# Patient Record
Sex: MALE | Race: WHITE
[De-identification: names, ages, dates, MRNs, and addresses within clinical notes are randomized per-mention and may not be internally consistent; named-entity substitution may affect disease eponyms.]

---

## 2017-02-11 ENCOUNTER — HOSPITAL ENCOUNTER (INPATIENT)
Dept: HOSPITAL 72 - EMR | Age: 82
LOS: 4 days | Discharge: NURSING, REHAB OR LONG-TERM CARE | DRG: 193 | End: 2017-02-15
Payer: MEDICARE

## 2017-02-11 VITALS — SYSTOLIC BLOOD PRESSURE: 121 MMHG | DIASTOLIC BLOOD PRESSURE: 57 MMHG

## 2017-02-11 VITALS — DIASTOLIC BLOOD PRESSURE: 50 MMHG | SYSTOLIC BLOOD PRESSURE: 107 MMHG

## 2017-02-11 VITALS — BODY MASS INDEX: 2.38 KG/M2 | WEIGHT: 17 LBS | HEIGHT: 71 IN

## 2017-02-11 VITALS — SYSTOLIC BLOOD PRESSURE: 107 MMHG | DIASTOLIC BLOOD PRESSURE: 47 MMHG

## 2017-02-11 VITALS — DIASTOLIC BLOOD PRESSURE: 60 MMHG | SYSTOLIC BLOOD PRESSURE: 104 MMHG

## 2017-02-11 VITALS — SYSTOLIC BLOOD PRESSURE: 118 MMHG | DIASTOLIC BLOOD PRESSURE: 57 MMHG

## 2017-02-11 VITALS — DIASTOLIC BLOOD PRESSURE: 64 MMHG | SYSTOLIC BLOOD PRESSURE: 122 MMHG

## 2017-02-11 DIAGNOSIS — Z74.01: ICD-10-CM

## 2017-02-11 DIAGNOSIS — I12.9: ICD-10-CM

## 2017-02-11 DIAGNOSIS — N18.9: ICD-10-CM

## 2017-02-11 DIAGNOSIS — J18.9: Primary | ICD-10-CM

## 2017-02-11 DIAGNOSIS — N39.0: ICD-10-CM

## 2017-02-11 DIAGNOSIS — R13.10: ICD-10-CM

## 2017-02-11 DIAGNOSIS — F03.90: ICD-10-CM

## 2017-02-11 DIAGNOSIS — Z66: ICD-10-CM

## 2017-02-11 DIAGNOSIS — R62.7: ICD-10-CM

## 2017-02-11 DIAGNOSIS — R53.2: ICD-10-CM

## 2017-02-11 DIAGNOSIS — E11.22: ICD-10-CM

## 2017-02-11 DIAGNOSIS — R09.02: ICD-10-CM

## 2017-02-11 DIAGNOSIS — L89.152: ICD-10-CM

## 2017-02-11 DIAGNOSIS — E11.65: ICD-10-CM

## 2017-02-11 LAB
ALBUMIN/GLOB SERPL: 0.8 {RATIO} (ref 1–2.7)
ALT SERPL-CCNC: 23 U/L (ref 3–41)
ANION GAP SERPL CALC-SCNC: 13 MMOL/L (ref 5–15)
APPEARANCE UR: (no result)
APTT BLD: 30 SEC (ref 23–33)
AST SERPL-CCNC: 14 U/L (ref 5–40)
BACTERIA #/AREA URNS HPF: (no result) /HPF
BASOPHILS NFR BLD MANUAL: 0 % (ref 0–2)
BILIRUB DIRECT SERPL-MCNC: 0.2 MG/DL (ref 0.1–0.3)
CALCIUM SERPL-MCNC: 9.3 MG/DL (ref 8.6–10.2)
CHLORIDE SERPL-SCNC: 96 MEQ/L (ref 98–107)
CO2 SERPL-SCNC: 27 MEQ/L (ref 20–30)
CREAT SERPL-MCNC: 0.9 MG/DL (ref 0.7–1.2)
EOSINOPHIL NFR BLD MANUAL: 3 % (ref 0–3)
ERYTHROCYTE [DISTWIDTH] IN BLOOD BY AUTOMATED COUNT: 13 % (ref 11.6–14.8)
GLOBULIN SER-MCNC: 3.3 G/DL
HEMOLYSIS: 3
INR PPP: 1.1 (ref 0.9–1.1)
KETONES UR QL STRIP: NEGATIVE
LEUKOCYTE ESTERASE UR QL STRIP: (no result)
MCH RBC QN AUTO: 31 PG (ref 27–31)
MCHC RBC AUTO-ENTMCNC: 34 G/DL (ref 32–36)
MCV RBC AUTO: 91 FL (ref 80–99)
NEUTS BAND NFR BLD MANUAL: 4 % (ref 0–8)
NEUTS BAND NFR BLD MANUAL: 60 % (ref 45–75)
NITRITE UR QL STRIP: NEGATIVE
PH UR STRIP: 6 [PH] (ref 4.5–8)
PLAT MORPH BLD: NORMAL
PLATELET # BLD EST: (no result) 10*3/UL
PLATELET # BLD: 75 K/UL (ref 150–450)
PMV BLD AUTO: 8.8 FL (ref 6.5–10.1)
POLYCHROMASIA BLD QL SMEAR: (no result)
POTASSIUM SERPL-SCNC: 4.2 MEQ/L (ref 3.4–4.9)
PROT SERPL-MCNC: 6.2 G/DL (ref 6.6–8.7)
PROT UR QL STRIP: (no result)
PROTHROMBIN TIME: 10.8 SEC (ref 9.3–11.5)
RBC # BLD AUTO: 4.11 M/UL (ref 4.7–6.1)
RBC #/AREA URNS HPF: (no result) /HPF (ref 0–0)
SODIUM SERPL-SCNC: 136 MEQ/L (ref 135–145)
SP GR UR STRIP: 1.01 (ref 1–1.03)
SQUAMOUS #/AREA URNS LPF: (no result) /LPF
TOTAL CELLS COUNTED BLD: 100
TROPONIN I SERPL-MCNC: < 0.3 NG/ML (ref ?–0.3)
UROBILINOGEN UR-MCNC: NORMAL MG/DL (ref 0–1)
VARIANT LYMPHS NFR BLD MANUAL: 28 % (ref 20–45)
WBC # BLD AUTO: 6.7 K/UL (ref 4.8–10.8)
WBC #/AREA URNS HPF: (no result) /HPF (ref 0–0)
YEAST #/AREA URNS HPF: (no result) /HPF

## 2017-02-11 PROCEDURE — 87081 CULTURE SCREEN ONLY: CPT

## 2017-02-11 PROCEDURE — 82962 GLUCOSE BLOOD TEST: CPT

## 2017-02-11 PROCEDURE — 83880 ASSAY OF NATRIURETIC PEPTIDE: CPT

## 2017-02-11 PROCEDURE — 84443 ASSAY THYROID STIM HORMONE: CPT

## 2017-02-11 PROCEDURE — 87493 C DIFF AMPLIFIED PROBE: CPT

## 2017-02-11 PROCEDURE — 87205 SMEAR GRAM STAIN: CPT

## 2017-02-11 PROCEDURE — 86140 C-REACTIVE PROTEIN: CPT

## 2017-02-11 PROCEDURE — 85025 COMPLETE CBC W/AUTO DIFF WBC: CPT

## 2017-02-11 PROCEDURE — 85610 PROTHROMBIN TIME: CPT

## 2017-02-11 PROCEDURE — 83036 HEMOGLOBIN GLYCOSYLATED A1C: CPT

## 2017-02-11 PROCEDURE — 87040 BLOOD CULTURE FOR BACTERIA: CPT

## 2017-02-11 PROCEDURE — 94664 DEMO&/EVAL PT USE INHALER: CPT

## 2017-02-11 PROCEDURE — 82550 ASSAY OF CK (CPK): CPT

## 2017-02-11 PROCEDURE — 94640 AIRWAY INHALATION TREATMENT: CPT

## 2017-02-11 PROCEDURE — 93005 ELECTROCARDIOGRAM TRACING: CPT

## 2017-02-11 PROCEDURE — 83605 ASSAY OF LACTIC ACID: CPT

## 2017-02-11 PROCEDURE — 81003 URINALYSIS AUTO W/O SCOPE: CPT

## 2017-02-11 PROCEDURE — 84484 ASSAY OF TROPONIN QUANT: CPT

## 2017-02-11 PROCEDURE — 80061 LIPID PANEL: CPT

## 2017-02-11 PROCEDURE — 71010: CPT

## 2017-02-11 PROCEDURE — 83735 ASSAY OF MAGNESIUM: CPT

## 2017-02-11 PROCEDURE — 84550 ASSAY OF BLOOD/URIC ACID: CPT

## 2017-02-11 PROCEDURE — 85007 BL SMEAR W/DIFF WBC COUNT: CPT

## 2017-02-11 PROCEDURE — 85730 THROMBOPLASTIN TIME PARTIAL: CPT

## 2017-02-11 PROCEDURE — 36415 COLL VENOUS BLD VENIPUNCTURE: CPT

## 2017-02-11 PROCEDURE — 86710 INFLUENZA VIRUS ANTIBODY: CPT

## 2017-02-11 PROCEDURE — 82248 BILIRUBIN DIRECT: CPT

## 2017-02-11 PROCEDURE — 87070 CULTURE OTHR SPECIMN AEROBIC: CPT

## 2017-02-11 PROCEDURE — 87086 URINE CULTURE/COLONY COUNT: CPT

## 2017-02-11 PROCEDURE — 84100 ASSAY OF PHOSPHORUS: CPT

## 2017-02-11 PROCEDURE — 80053 COMPREHEN METABOLIC PANEL: CPT

## 2017-02-11 RX ADMIN — DEXTROSE MONOHYDRATE SCH MLS/HR: 50 INJECTION, SOLUTION INTRAVENOUS at 22:00

## 2017-02-11 RX ADMIN — METOPROLOL TARTRATE SCH MG: 25 TABLET, FILM COATED ORAL at 21:00

## 2017-02-11 NOTE — HISTORY & PHYSICAL
History and Physical


History & Physicial








seen in ER room 7


uti


dm ooc


htn 


dementia


asp prone





plan:





antibiotics


bs and bp control


GT feeding





# [id number]











NAY CALDWELL Feb 11, 2017 18:42

## 2017-02-11 NOTE — EMERGENCY ROOM REPORT
History of Present Illness


General


Chief Complaint:  Dyspnea/Respdistress


Source:  Medical Record, EMS





Present Illness


HPI


The patient presents with cough and possible pneumonia.  The patient is unable 

to give a history.  Is coming from a skilled nursing facility has a history of 

pneumonia in the past.  His O2 sat at the facility was 98% and during transport 

it dropped to 93%.





No other history is available from the patient is not answering questions at 

this time although he will open his eyes.





He was discharged 11/10/16 with these diagnoses:


FINAL DIAGNOSES:


1. Fever.


2. Urinary tract infection.


3. Diabetes mellitus out of control.


4. Renal failure.


5. Dehydration.


6. Hypernatremia.


7. Stage II to III decubitus ulcer.


8. Hypertension.


9. Dementia.


10. Aspiration prone.


Allergies:  


Coded Allergies:  


     No Known Allergies (Unverified , 3/23/16)





Patient History


Limited by:  medical condition


Past Medical History:  see triage record, old chart reviewed


Social History Narrative


SNF


Reviewed Nursing Documentation:  PMH: Agreed, PSxH: Agreed





Nursing Documentation-PMH


Hx Cardiac Problems:  Yes -  Hypercholesteremia


Hx Hypertension:  Yes


Hx Diabetes:  Yes


Hx Cancer:  No


Hx Gastrointestinal Problems:  Yes - Dysphagia; g-tube


Hx Neurological Problems:  Yes - Dementia, Dysphagia


Hx Dementia:  Yes


Hx Dysphasia:  Yes





Review of Systems


All Other Systems:  limited





Physical Exam





Vital Signs








  Date Time  Temp Pulse Resp B/P Pulse Ox O2 Delivery O2 Flow Rate FiO2


 


2/11/17 14:10 97.3 77 20 102/57 95 Nasal Cannula 2.0 








Sp02 EP Interpretation:  reviewed, abnormal - as interpreted by me


General Appearance:  no apparent distress, Chronically Ill, Stupor


Head:  normocephalic, atraumatic


Eyes:  bilateral eye PERRL, bilateral eye normal inspection


ENT:  moist mucus membranes


Neck:  supple


Respiratory:  crackles, rhonchi - R


Cardiovascular #1:  regular rate, rhythm, no edema


Cardiovascular #2:  2+ radial (R)


Gastrointestinal:  normal inspection, normal bowel sounds, non tender, soft, no 

mass, non-distended


Musculoskeletal:  other - atrophy


Neurologic:  responsive, sensory intact, motor weakness - diffuse weakness


Psychiatric:  depressed affect


Reflexes:  2+ knee (R), 2+ knee (L)


Skin:  warm/dry, other - multiple linear maculopapular excoriated lesions 

throughout





Medical Decision Making


Diagnostic Impression:  


 Primary Impression:  


 UTI (urinary tract infection)


 Qualified Codes:  N30.00 - Acute cystitis without hematuria


 Additional Impressions:  


 Hypoxia


 Dementia


 Qualified Codes:  F03.90 - Unspecified dementia without behavioral disturbance


ER Course


Patient presents with possible pneumonia with hypoxia.  Complicated patient as 

unable to give hx.  Exam c/w pneumonia, but evaluation needed with BC, lactate, 

labs, EKG, CXR.  Will receive IV hydration and oxygen.





Labs c/w UTI.  CXR not with large infiltrate.  Antibiotics begun.





Improved with hydration, but still with lethargy.





Call to Dr. Schilling 16:20.





Dr. Schilling examined pt in ED.





Laboratory Tests








Test


  2/11/17


14:30 2/11/17


15:20


 


White Blood Count


  6.7 K/UL


(4.8-10.8) 


 


 


Red Blood Count


  4.11 M/UL


(4.70-6.10)  L 


 


 


Hemoglobin


  12.8 G/DL


(14.2-18.0)  L 


 


 


Hematocrit


  37.6 %


(42.0-52.0)  L 


 


 


Mean Corpuscular Volume 91 FL (80-99)   


 


Mean Corpuscular Hemoglobin


  31.0 PG


(27.0-31.0) 


 


 


Mean Corpuscular Hemoglobin


Concent 34.0 G/DL


(32.0-36.0) 


 


 


Red Cell Distribution Width


  13.0 %


(11.6-14.8) 


 


 


Platelet Count


  75 K/UL


(150-450)  L 


 


 


Mean Platelet Volume


  8.8 FL


(6.5-10.1) 


 


 


Neutrophils (%) (Auto)


  % (45.0-75.0)


  


 


 


Lymphocytes (%) (Auto)


  % (20.0-45.0)


  


 


 


Monocytes (%) (Auto)  % (1.0-10.0)   


 


Eosinophils (%) (Auto)  % (0.0-3.0)   


 


Basophils (%) (Auto)  % (0.0-2.0)   


 


Differential Total Cells


Counted 100  


  


 


 


Neutrophils % (Manual) 60 % (45-75)   


 


Lymphocytes % (Manual) 28 % (20-45)   


 


Monocytes % (Manual) 5 % (1-10)   


 


Eosinophils % (Manual) 3 % (0-3)   


 


Basophils % (Manual) 0 % (0-2)   


 


Band Neutrophils 4 % (0-8)   


 


Platelet Estimate Decreased  L 


 


Platelet Morphology Normal   


 


Polychromasia 1+   


 


Prothrombin Time


  10.8 SEC


(9.30-11.50) 


 


 


Prothrombin Time INR 1.1 (0.9-1.1)   


 


PTT


  30 SEC (23-33)


  


 


 


Sodium Level


  136 mEQ/L


(135-145) 


 


 


Potassium Level


  4.2 mEQ/L


(3.4-4.9) 


 


 


Chloride Level


  96 mEQ/L


()  L 


 


 


Carbon Dioxide Level


  27 mEQ/L


(20-30) 


 


 


Anion Gap 13 (5-15)   


 


Blood Urea Nitrogen


  23 mg/dL


(7-23) 


 


 


Creatinine


  0.9 mg/dL


(0.7-1.2) 


 


 


Estimate Glomerular


Filtration Rate  mL/min (>60)  


  


 


 


Glucose Level


  235 mg/dL


()  H 


 


 


Lactic Acid Level


  1.10 mmol/L


(0.66-2.22) 


 


 


Calcium Level


  9.3 mg/dL


(8.6-10.2) 


 


 


Total Bilirubin


  1.4 mg/dL


(0.0-1.2)  H 


 


 


Direct Bilirubin


  0.2 mg/dL


(0.1-0.3) 


 


 


Aspartate Amino Transferase


(AST) 14 U/L (5-40)  


  


 


 


Alanine Aminotransferase (ALT) 23 U/L (3-41)   


 


Alkaline Phosphatase


  79 U/L


() 


 


 


Total Creatine Kinase


  32 U/L


()  L 


 


 


Troponin I


  < 0.30 ng/mL


(<=0.30) 


 


 


Pro-B-Type Natriuretic Peptide


  378 pg/mL


(0-450) 


 


 


Total Protein


  6.2 g/dL


(6.6-8.7)  L 


 


 


Albumin


  2.9 g/dL


(3.5-5.2)  L 


 


 


Globulin 3.3 g/dL   


 


Albumin/Globulin Ratio


  0.8 (1.0-2.7)


L 


 


 


Urine Color  Yellow  


 


Urine Appearance


  


  Slightly


cloudy


 


Urine pH  6 (4.5-8.0)  


 


Urine Specific Gravity


  


  1.010


(1.005-1.035)


 


Urine Protein


  


  1+ (NEGATIVE)


H


 


Urine Glucose (UA)


  


  3+ (NEGATIVE)


H


 


Urine Ketones


  


  Negative


(NEGATIVE)


 


Urine Occult Blood


  


  3+ (NEGATIVE)


H


 


Urine Nitrite


  


  Negative


(NEGATIVE)


 


Urine Bilirubin


  


  Negative


(NEGATIVE)


 


Urine Urobilinogen


  


  Normal MG/DL


(0.0-1.0)


 


Urine Leukocyte Esterase


  


  3+ (NEGATIVE)


H


 


Urine RBC


  


  5-10 /HPF (0 -


0)  H


 


Urine WBC


  


  Tntc /HPF (0 -


0)  H


 


Urine Squamous Epithelial


Cells 


  None /LPF


(NONE/OCC)


 


Urine Bacteria


  


  Moderate /HPF


(NONE)  H


 


Urine Yeast


  


  Many /HPF


(NONE)  H








Microbiology








 Date/Time


Source Procedure


Growth Status


 


 


 2/11/17 14:27


Nasal Nares Influenza Types A,B Antigen (LINO) - Final Complete








EKG Diagnostic Results


Rate:  normal


Rhythm:  NSR


ST Segments:  no acute changes





Rhythm Strip Diag. Results


EP Interpretation:  yes


Rhythm:  NSR, no PVC's, other - atrial bigemini





Chest X-Ray Diagnostic Results


EP Interpretation:  Yes


Findings:  no effusion, no pneumothorax, other


Number of Views:  1





Last Vital Signs








  Date Time  Temp Pulse Resp B/P Pulse Ox O2 Delivery O2 Flow Rate FiO2


 


2/12/17 01:48 98.4 81 22 120/52 96 Nasal Cannula 2.0 








Status:  improved


Disposition:  ADMITTED AS INPATIENT


Condition:  Serious











Ariel Ballard M.D. Feb 11, 2017 14:36

## 2017-02-12 VITALS — SYSTOLIC BLOOD PRESSURE: 121 MMHG | DIASTOLIC BLOOD PRESSURE: 53 MMHG

## 2017-02-12 VITALS — DIASTOLIC BLOOD PRESSURE: 65 MMHG | SYSTOLIC BLOOD PRESSURE: 127 MMHG

## 2017-02-12 VITALS — SYSTOLIC BLOOD PRESSURE: 120 MMHG | DIASTOLIC BLOOD PRESSURE: 52 MMHG

## 2017-02-12 VITALS — DIASTOLIC BLOOD PRESSURE: 52 MMHG | SYSTOLIC BLOOD PRESSURE: 100 MMHG

## 2017-02-12 VITALS — DIASTOLIC BLOOD PRESSURE: 55 MMHG | SYSTOLIC BLOOD PRESSURE: 101 MMHG

## 2017-02-12 VITALS — DIASTOLIC BLOOD PRESSURE: 76 MMHG | SYSTOLIC BLOOD PRESSURE: 126 MMHG

## 2017-02-12 VITALS — SYSTOLIC BLOOD PRESSURE: 120 MMHG | DIASTOLIC BLOOD PRESSURE: 59 MMHG

## 2017-02-12 VITALS — SYSTOLIC BLOOD PRESSURE: 109 MMHG | DIASTOLIC BLOOD PRESSURE: 65 MMHG

## 2017-02-12 RX ADMIN — METOPROLOL TARTRATE SCH MG: 25 TABLET, FILM COATED ORAL at 21:00

## 2017-02-12 RX ADMIN — METOCLOPRAMIDE HYDROCHLORIDE SCH MG: 5 SOLUTION ORAL at 17:56

## 2017-02-12 RX ADMIN — METOCLOPRAMIDE HYDROCHLORIDE SCH MG: 5 SOLUTION ORAL at 00:30

## 2017-02-12 RX ADMIN — METOCLOPRAMIDE HYDROCHLORIDE SCH MG: 5 SOLUTION ORAL at 11:39

## 2017-02-12 RX ADMIN — ASPIRIN 81 MG SCH MG: 81 TABLET ORAL at 11:39

## 2017-02-12 RX ADMIN — SITAGLIPTIN SCH MG: 50 TABLET, FILM COATED ORAL at 06:30

## 2017-02-12 RX ADMIN — METOCLOPRAMIDE HYDROCHLORIDE SCH MG: 5 SOLUTION ORAL at 06:17

## 2017-02-12 RX ADMIN — METOPROLOL TARTRATE SCH MG: 25 TABLET, FILM COATED ORAL at 11:39

## 2017-02-12 RX ADMIN — DEXTROSE MONOHYDRATE SCH MLS/HR: 50 INJECTION, SOLUTION INTRAVENOUS at 21:54

## 2017-02-12 RX ADMIN — DEXTROSE MONOHYDRATE SCH MLS/HR: 50 INJECTION, SOLUTION INTRAVENOUS at 13:48

## 2017-02-12 RX ADMIN — INSULIN ASPART SCH UNITS: 100 INJECTION, SOLUTION INTRAVENOUS; SUBCUTANEOUS at 17:57

## 2017-02-12 RX ADMIN — DEXTROSE MONOHYDRATE SCH MLS/HR: 50 INJECTION, SOLUTION INTRAVENOUS at 06:02

## 2017-02-12 NOTE — CONSULTATION
Consult Note


Assessment/Plan


ID Dic #  [id number]








Pneumonia :


     cough and sputum 


Fever


UTI








DM


CKD


HTN


Dementia














P:





Cont pt on IV Zosyn 


Monitor CBC


Monitor BMP


Monitor Cx ( Bl, Ur,SP )


Monitor CXray 








Thank you











MICKEY HUITRON M.D. Feb 12, 2017 12:00

## 2017-02-12 NOTE — CONSULTATION
History of Present Illness


General


Date patient seen:  Feb 12, 2017


Chief Complaint:  Dyspnea/Respdistress


Referring physician:  Dr. Schilling


Reason for Consultation:  dyspnea and sepsis





Present Illness


HPI


87 year old male with hx of dementia, Gtube feeding,  diabetes, HTN, bed bound, 

nursing home resident, at extreme of age, presented with cough and fever .  The 

patient is unable to give a history. Apparently his oxygenation dropped and he 

needed supplemental oxygen.  He is admitted to work up his fever, desaturation 

and treatment of his decubiti.


Allergies:  


Coded Allergies:  


     No Known Allergies (Unverified , 3/23/16)





Medication History


Scheduled


Ascorbic Acid* (Vitamin C*), 500 MG ORAL DAILY, (Reported)


Aspirin* (Aspirin*), 81 MG ORAL DAILY, (Reported)


Aspirin* (Aspir 81*), 81 MG GT DAILY, (Reported)


Atorvastatin Calcium* (Atorvastatin Calcium*), 10 MG ORAL BEDTIME, (Reported)


Atorvastatin Calcium* (Atorvastatin Calcium*), 10 MG GT BEDTIME, (Reported)


Docusate Sodium (Docusate Sodium), 100 MG ORAL DAILY, (Reported)


Metoclopramide HCl (Metoclopramide HCl), 5 MG GT EVERY 6 HOURS


Metoprolol Tartrate (Metoprolol Tartrate), 12.5 MG ORAL Q12HR


Metoprolol Tartrate* (Metoprolol Tartrate*), 12.5 MG GT EVERY 12 HOURS, (

Reported)


Multivitamin With Minerals (Multivitamins With Minerals*), 1 TAB ORAL DAILY, (

Reported)


Ranitidine Hcl* (Zantac*), 150 MG ORAL TWICE A DAY


Ranitidine Hcl* (Zantac*), 150 MG GT TWICE A DAY, (Reported)


Repaglinide (Prandin), 2 MG ORAL TIAC


Repaglinide (Prandin), 2 MG GT THREE TIMES A DAY, (Reported)


Sennosides (Senna), 8.6 MG PO BEDTIME, (Reported)


Sitagliptin (Januvia), 50 MG GT ACBREAKFAST


Sitagliptin* (Januvia*), 100 MG ORAL DAILY, (Reported)


Tamsulosin HCl (Flomax), 0.4 MG ORAL BEDTIME


Tamsulosin Hcl (Tamsulosin Hcl*), 0.4 MG GT BEDTIME, (Reported)


Zinc Sulfate (Zinc Sulfate), 220 MG ORAL DAILY, (Reported)





Scheduled PRN


Acetaminophen (Acetaminophen), 650 MG GT Q6H PRN for Prn Headache/Temp > 101, (

Reported)


Acetaminophen* (Acetaminophen*), 650 MG ORAL Q6H PRN for Mild Pain/Temp > 100.5,

 (Reported)





Miscellaneous Medications


Multivit-Min/Iron Fum/Folic AC (Multi-Vitamin-Minerals Tablet), 1 EACH GT, (

Reported)


Sennosides (Senna), 8.8 MG GT, (Reported)





Patient History


Healthcare decision maker


||Megan Arreaga [phone]


Resuscitation status


Do Not Resuscitate


Advanced Directive on File


Yes





Past Medical/Surgical History


Past Medical/Surgical History:  


(1) Diabetes mellitus


(2) Dementia


(3) Failure to thrive





Review of Systems


All Other Systems:  negative except mentioned in HPI





Physical Exam


Lines, tubes and drains:  peripheral, gtube


HEENT:  normocephalic, atraumatic


Neck:  non-tender, normal alignment


Respiratory/Chest:  chest wall non-tender, rhonchi  - left, rhonchi - right


Cardiovascular/Chest:  normal peripheral pulses, normal rate


Abdomen:  normal bowel sounds, non tender, feeding tube


Genitourinary/Rectal:  normal genital exam


Extremities:  normal range of motion


Skin Exam:  rash, other - sacral redness and stage 2





Last 24 Hour Vital Signs








  Date Time  Temp Pulse Resp B/P Pulse Ox O2 Delivery O2 Flow Rate FiO2


 


2/12/17 11:59 98.2 83 20 120/59 96 Nasal Cannula 2.0 


 


2/12/17 11:39  85  126/76    


 


2/12/17 09:32 97.9 85 21 126/76 97 Nasal Cannula 2.0 


 


2/12/17 08:59 100.3 83 16 100/52 95 Room Air 2.0 


 


2/12/17 07:30 100.3 83 16 100/52 95 Room Air  


 


2/12/17 05:55 98.3 86 20 121/53 96 Nasal Cannula 2.0 


 


2/12/17 03:37 98.3 88 22 127/65 95 Nasal Cannula 2.0 


 


2/12/17 01:48 98.4 81 22 120/52 96 Nasal Cannula 2.0 


 


2/11/17 23:40 98.4 81 22 118/57 95 Nasal Cannula 2.0 


 


2/11/17 21:49 98.4 81 16 104/60 99 Nasal Cannula 2.0 


 


2/11/17 21:00  81  104/60    


 


2/11/17 20:33 99.9 83 14 107/47 98 Nasal Cannula 2.0 


 


2/11/17 19:29 98.1 75 16 121/57 100 Nasal Cannula 2.0 


 


2/11/17 17:28 97.6 77 14 107/50 100 Nasal Cannula 2.0 

















Intake and Output  


 


 2/11/17 2/12/17





 19:00 07:00


 


Intake Total 660 ml 2710 ml


 


Output Total 225 ml 1525 ml


 


Balance 435 ml 1185 ml


 


  


 


IV Total 660 ml 1110 ml


 


Other  1600 ml


 


Output Urine Total 225 ml 1525 ml








Height (Feet):  5


Height (Inches):  11.00


Weight (Pounds):  17


Medications





Current Medications








 Medications


  (Trade)  Dose


 Ordered  Sig/Shasta


 Route


 PRN Reason  Start Time


 Stop Time Status Last Admin


Dose Admin


 


 Acetaminophen


  (Tylenol)  650 mg  Q6H  PRN


 GT


 Prn Headache/Temp > 101  2/11/17 18:45


 3/13/17 18:44   


 


 


 Aspirin 81 mg  81 mg  DAILY


 GT


   2/12/17 09:00


 3/14/17 08:59  2/12/17 11:39


 


 


 Atorvastatin


 Calcium


  (Lipitor)  10 mg  BEDTIME


 GT


   2/11/17 22:00


 3/13/17 21:59  2/11/17 23:21


 


 


 Dextrose


  (Dextrose 50%)    STAT  PRN


 IV


 Hypoglycemia  2/12/17 10:30


 3/14/17 10:29   


 


 


 Insulin Aspart


  (NovoLOG)    BEFORE MEALS AND  HS


 SUBQ


   2/12/17 11:30


 3/14/17 11:29  2/12/17 12:54


 


 


 Metoclopramide HCl


  (Reglan)  5 mg  EVERY 6  HOURS


 GT


   2/12/17 00:00


 3/14/17 00:00  2/12/17 11:39


 


 


 Metoprolol


 Tartrate


  (Lopressor)  12.5 mg  EVERY 12  HOURS


 GT


   2/11/17 21:00


 3/13/17 20:59  2/12/17 11:39


 


 


 Piperacillin Sod/


 Tazobactam Sod/


 Sodium Chloride


  (Zosyn/Sodium


 Chloride)  110 ml @ 


 27.5 mls/hr  Q8HR


 IVPB


   2/11/17 22:00


 2/18/17 21:59  2/12/17 13:48


 


 


 Ranitidine HCl


  (Zantac)  150 mg  TWICE A  DAY


 GT


   2/12/17 09:00


 3/14/17 08:59  2/12/17 11:38


 


 


 Sitagliptin


 Phosphate


  (Januvia)  50 mg  ACBREAKFAST


 GT


   2/12/17 06:30


 3/14/17 06:29   


 


 


 Sodium Chloride


  (Sodium Chloride


 1000ml bag)  1,000 ml @ 


 50 mls/hr  Q20H


 IV


   2/11/17 22:00


 3/13/17 21:59  2/12/17 11:38


 











Assessment/Plan


Problem List:  


(1) Pneumonia


ICD Codes:  J18.9 - Pneumonia, unspecified organism


SNOMED:  888254992


Qualifiers:  


   


(2) Hypoxia


ICD Codes:  R09.02 - Hypoxemia


SNOMED:  983724035, 76190858


(3) UTI (urinary tract infection)


ICD Codes:  N39.0 - Urinary tract infection, site not specified


SNOMED:  17836164, 48046694


Qualifiers:  


   Qualified Codes:  N30.00 - Acute cystitis without hematuria


(4) Diabetes mellitus


ICD Codes:  E11.9 - Type 2 diabetes mellitus without complications


SNOMED:  15523545


(5) Failure to thrive


SNOMED:  33604114


(6) Feeding by G-tube


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


SNOMED:  732286031, 880636581


(7) Bedridden


ICD Codes:  Z74.01 - Bed confinement status


SNOMED:  897846414


(8) Dementia


ICD Codes:  F03.90 - Unspecified dementia without behavioral disturbance


SNOMED:  17616011


Qualifiers:  


   Qualified Codes:  F03.90 - Unspecified dementia without behavioral 

disturbance


Assessment/Plan


respiratory treatment


check sputum


IV antibiotics


wound care


gtube care


dvt prophylaxis











JOAN DEMARCO Feb 12, 2017 15:28

## 2017-02-12 NOTE — GENERAL PROGRESS NOTE
Assessment/Plan


Status:  unchanged


Assessment/Plan


uti ? pneumonia


dm ooc


htn 


dementia


asp prone








plan:





Antibiotics


Pulmonary toilet-


BP and BS control


per orders





Subjective


ROS Limited/Unobtainable:  No


Constitutional:  Reports: malaise, weakness


Allergies:  


Coded Allergies:  


     No Known Allergies (Unverified , 3/23/16)





Objective





Last 24 Hour Vital Signs








  Date Time  Temp Pulse Resp B/P Pulse Ox O2 Delivery O2 Flow Rate FiO2


 


2/12/17 11:59 98.2 83 20 120/59 96 Nasal Cannula 2.0 


 


2/12/17 11:39  85  126/76    


 


2/12/17 09:32 97.9 85 21 126/76 97 Nasal Cannula 2.0 


 


2/12/17 08:59 100.3 83 16 100/52 95 Room Air 2.0 


 


2/12/17 07:30 100.3 83 16 100/52 95 Room Air  


 


2/12/17 05:55 98.3 86 20 121/53 96 Nasal Cannula 2.0 


 


2/12/17 03:37 98.3 88 22 127/65 95 Nasal Cannula 2.0 


 


2/12/17 01:48 98.4 81 22 120/52 96 Nasal Cannula 2.0 


 


2/11/17 23:40 98.4 81 22 118/57 95 Nasal Cannula 2.0 


 


2/11/17 21:49 98.4 81 16 104/60 99 Nasal Cannula 2.0 


 


2/11/17 21:00  81  104/60    


 


2/11/17 20:33 99.9 83 14 107/47 98 Nasal Cannula 2.0 


 


2/11/17 19:29 98.1 75 16 121/57 100 Nasal Cannula 2.0 


 


2/11/17 17:28 97.6 77 14 107/50 100 Nasal Cannula 2.0 

















Intake and Output  


 


 2/11/17 2/12/17





 19:00 07:00


 


Intake Total 660 ml 2710 ml


 


Output Total 225 ml 1525 ml


 


Balance 435 ml 1185 ml


 


  


 


IV Total 660 ml 1110 ml


 


Other  1600 ml


 


Output Urine Total 225 ml 1525 ml











Current Medications








 Medications


  (Trade)  Dose


 Ordered  Sig/Shasta


 Route


 PRN Reason  Start Time


 Stop Time Status Last Admin


Dose Admin


 


 Acetaminophen


  (Tylenol)  650 mg  Q6H  PRN


 GT


 Prn Headache/Temp > 101  2/11/17 18:45


 3/13/17 18:44   


 


 


 Aspirin 81 mg  81 mg  DAILY


 GT


   2/12/17 09:00


 3/14/17 08:59  2/12/17 11:39


 


 


 Atorvastatin


 Calcium


  (Lipitor)  10 mg  BEDTIME


 GT


   2/11/17 22:00


 3/13/17 21:59  2/11/17 23:21


 


 


 Dextrose


  (Dextrose 50%)    STAT  PRN


 IV


 Hypoglycemia  2/12/17 10:30


 3/14/17 10:29   


 


 


 Insulin Aspart


  (NovoLOG)    Q6HR


 SUBQ


   2/12/17 18:00


 3/14/17 17:59   


 


 


 Metoclopramide HCl


  (Reglan)  5 mg  EVERY 6  HOURS


 GT


   2/12/17 00:00


 3/14/17 00:00  2/12/17 11:39


 


 


 Metoprolol


 Tartrate


  (Lopressor)  12.5 mg  EVERY 12  HOURS


 GT


   2/11/17 21:00


 3/13/17 20:59  2/12/17 11:39


 


 


 Piperacillin Sod/


 Tazobactam Sod/


 Sodium Chloride


  (Zosyn/Sodium


 Chloride)  110 ml @ 


 27.5 mls/hr  Q8HR


 IVPB


   2/11/17 22:00


 2/18/17 21:59  2/12/17 13:48


 


 


 Ranitidine HCl


  (Zantac)  150 mg  TWICE A  DAY


 GT


   2/12/17 09:00


 3/14/17 08:59  2/12/17 11:38


 


 


 Sitagliptin


 Phosphate


  (Januvia)  50 mg  ACBREAKFAST


 GT


   2/12/17 06:30


 3/14/17 06:29   


 


 


 Sodium Chloride


  (Sodium Chloride


 1000ml bag)  1,000 ml @ 


 50 mls/hr  Q20H


 IV


   2/11/17 22:00


 3/13/17 21:59  2/12/17 11:38


 








Height (Feet):  5


Height (Inches):  11.00


Weight (Pounds):  17


General Appearance:  no apparent distress, lethargic, other - febrile


Neck:  stiff neck


Cardiovascular:  tachycardia


Respiratory/Chest:  decreased breath sounds


Abdomen:  distended, other - GT+


Objective


other PE not changed











NAY CALDWELL Feb 12, 2017 16:37

## 2017-02-12 NOTE — DIAGNOSTIC IMAGING REPORT
Clinical history: Cuff.



Technique: Portable AP chest radiograph was obtained.



Comparison: 12/4/16.



Findings:



Lung volumes are low with suspected basilar atelectasis and mild scattered changes

of chronic lung disease. There is no pneumonia or pulmonary edema.  There is no

pleural effusion or pneumothorax.  The cardiac and mediastinal silhouettes are

normal in appearance.  The bony thorax is unremarkable. There is otherwise no

significant interval change in the interval, allowing for differences in technique

and positioning.



Impression:



Low lung volumes with probable basilar atelectasis and scattered changes of chronic

lung disease. No evidence of pneumonia or overt vascular congestion.

## 2017-02-12 NOTE — WOUND CARE CONSULTATION
Wound Assessment


Wound Assessment :  


   Wound Present on Admission:  Yes


   New Wound:  No


   Status Change of Wound:  No


   Wound Location Body Site Modif:  mid


   Wound Location Body Site:  sacral


   Wound Type:  pressure ulcer


   Elpidio Test:  Does not Elpidio


   Pressure Ulcer Stage:  II


   Wound Thickness:  Partial Thickness


   Wound Length:  0.5


   Wound Width:  0.5


   Wound Depth:  0.1


   Percent of Wound Pink/Red:  100


   Wound Drainage Description:  Serosanguineous


   Wound Drainage Odor:  None/Absent


   Tissue Surrounding Wound:  Erythemic


   Wound General Appearance:  Reddened, Well Approximated


Wound Comment





#1 Sacral stage II pressure ulcer. surrounding skin with redness 





Recommendation





-Keep clean and dry


-Turn and reposition


-Local wound care with application of Triad cream and dry drg


-Optimize nutrition


-Offload both heels


-Heel protector on both heels


-Assess and f/u accordingly for any changes











RICH BRUNO RN Feb 12, 2017 19:47

## 2017-02-12 NOTE — CONSULTATION
DATE OF CONSULTATION:



INFECTIOUS DISEASE CONSULTATION



CONSULTING PHYSICIAN:  Huy Love M.D.



REFERRING PHYSICIAN:  Nish Schilling M.D.



REASON FOR CONSULTATION:  Evaluation of the patient for pneumonia,

UTI, and antibiotic management.



HISTORY OF PRESENT ILLNESS:  The patient is an 87-year-old male with

multiple medical problems as listed below, who was brought to the hospital

for cough and chest congestion.  Also, the patient was found to have

urinary tract infection.  Infectious Disease consultation has been

requested for further evaluation of the patient's antibiotic management.



PAST MEDICAL HISTORY:

1. History of urinary tract infection.

2. History of chronic Tejada catheter.

3. Diabetes.

4. History of renal insufficiency.

5. Hypertension.

6. Dementia.



ALLERGIES:  No known drug allergies.



SOCIAL HISTORY:  The patient lives in nursing home.



FAMILY HISTORY:  Noncontributory.



REVIEW OF SYSTEMS:  Limited.  Much of the information not able to

gather as mentioned above.



PHYSICAL EXAMINATION:

VITAL SIGNS:  Temperature 100.3 degrees, blood pressure 100/52, pulse

80, and respiratory rate 18.

HEENT:  Mild pale conjunctivae.  No icterus.

NECK:  No lymphadenopathy.

CHEST:  Coarse breathing sounds.

HEART:  S1 and S2.

ABDOMEN:  Soft.  G tube in place.

EXTREMITIES:  No cyanosis.  _____.

NEUROLOGIC:  Alert and awake.



LABORATORY AND DIAGNOSTIC DATA:  WBC 6.7, hemoglobin 12.8, and

platelets 75,000.  Urinalysis too numerous to count white blood cells, BUN

22, creatinine 0.3.  ALT, AST and alkaline phosphatase unremarkable.

Urine culture is pending, reported as negative.  Chest x-ray shows low

lung volumes with basilar atelectasis.



ASSESSMENT:  The patient is an 87-year-old female admitted with

multiple medical problems who was admitted to this medical center due to

fever and cough.  The patient's urinalysis showed pyuria.  The patient's

source of fever appears to be pneumonia and also urinary tract infection.

Chest x-ray has not been remarkable, however, subtle pneumonia cannot be

ruled out.



PLAN:

1. We will continue the patient on IV Zosyn.

2. Monitor CBC.

3. Monitor BMP.

4. Monitor cultures (blood, sputum and urine).

5. Monitor the patient's clinical course.

6. Monitor chest x-ray.

7. Based on the patient's clinical course and labs, we will do further

recommendations.



Thank you, Dr. Schilling, for allowing me to participate in the care of

this patient.  I will follow this patient with you.









  ______________________________________________

  Huy Love M.D.





DR:  FREDDIE

D:  02/12/2017 11:58

T:  02/12/2017 19:20

JOB#:  [id number]

CC:

## 2017-02-13 VITALS — DIASTOLIC BLOOD PRESSURE: 62 MMHG | SYSTOLIC BLOOD PRESSURE: 117 MMHG

## 2017-02-13 VITALS — SYSTOLIC BLOOD PRESSURE: 105 MMHG | DIASTOLIC BLOOD PRESSURE: 55 MMHG

## 2017-02-13 VITALS — DIASTOLIC BLOOD PRESSURE: 61 MMHG | SYSTOLIC BLOOD PRESSURE: 112 MMHG

## 2017-02-13 VITALS — DIASTOLIC BLOOD PRESSURE: 51 MMHG | SYSTOLIC BLOOD PRESSURE: 111 MMHG

## 2017-02-13 VITALS — SYSTOLIC BLOOD PRESSURE: 126 MMHG | DIASTOLIC BLOOD PRESSURE: 63 MMHG

## 2017-02-13 VITALS — DIASTOLIC BLOOD PRESSURE: 59 MMHG | SYSTOLIC BLOOD PRESSURE: 112 MMHG

## 2017-02-13 LAB
ALBUMIN/GLOB SERPL: 0.8 {RATIO} (ref 1–2.7)
ALT SERPL-CCNC: 17 U/L (ref 3–41)
ANION GAP SERPL CALC-SCNC: 12 MMOL/L (ref 5–15)
AST SERPL-CCNC: 13 U/L (ref 5–40)
BASOPHILS NFR BLD MANUAL: 1 % (ref 0–2)
BILIRUB DIRECT SERPL-MCNC: 0.2 MG/DL (ref 0.1–0.3)
CALCIUM SERPL-MCNC: 8.8 MG/DL (ref 8.6–10.2)
CHLORIDE SERPL-SCNC: 103 MEQ/L (ref 98–107)
CHOLEST SERPL-MCNC: 66 MG/DL (ref ?–200)
CHOLEST/HDLC SERPL: 2.8 {RATIO} (ref 3.3–4.4)
CO2 SERPL-SCNC: 26 MEQ/L (ref 20–30)
CREAT SERPL-MCNC: 0.8 MG/DL (ref 0.7–1.2)
CRP SERPL-MCNC: 5 MG/DL (ref ?–0.5)
EOSINOPHIL NFR BLD MANUAL: 6 % (ref 0–3)
ERYTHROCYTE [DISTWIDTH] IN BLOOD BY AUTOMATED COUNT: 13 % (ref 11.6–14.8)
GLOBULIN SER-MCNC: 3.1 G/DL
HBA1C MFR BLD: 8.6 % (ref ?–6)
HEMOLYSIS: 4
LDLC SERPL ELPH-MCNC: 29 MG/DL (ref 60–99)
MAGNESIUM SERPL-MCNC: 1.7 MG/DL (ref 1.7–2.5)
MCH RBC QN AUTO: 31.5 PG (ref 27–31)
MCHC RBC AUTO-ENTMCNC: 34.1 G/DL (ref 32–36)
MCV RBC AUTO: 92 FL (ref 80–99)
NEUTS BAND NFR BLD MANUAL: 0 % (ref 0–8)
NEUTS BAND NFR BLD MANUAL: 59 % (ref 45–75)
PHOSPHATE SERPL-MCNC: 2.6 MG/DL (ref 2.5–4.8)
PLAT MORPH BLD: NORMAL
PLATELET # BLD EST: (no result) 10*3/UL
PLATELET # BLD: 82 K/UL (ref 150–450)
PMV BLD AUTO: 8.9 FL (ref 6.5–10.1)
POTASSIUM SERPL-SCNC: 3.8 MEQ/L (ref 3.4–4.9)
PROT SERPL-MCNC: 5.8 G/DL (ref 6.6–8.7)
RBC # BLD AUTO: 3.92 M/UL (ref 4.7–6.1)
RBC MORPH BLD: NORMAL
SODIUM SERPL-SCNC: 141 MEQ/L (ref 135–145)
TOTAL CELLS COUNTED BLD: 100
TSH SERPL-ACNC: 1.53 UIU/ML (ref 0.3–4.5)
URATE SERPL-MCNC: 2.3 MG/DL (ref 3–7.5)
VARIANT LYMPHS NFR BLD MANUAL: 29 % (ref 20–45)
WBC # BLD AUTO: 5 K/UL (ref 4.8–10.8)

## 2017-02-13 RX ADMIN — METOCLOPRAMIDE HYDROCHLORIDE SCH MG: 5 SOLUTION ORAL at 06:37

## 2017-02-13 RX ADMIN — DEXTROSE MONOHYDRATE SCH MLS/HR: 50 INJECTION, SOLUTION INTRAVENOUS at 06:36

## 2017-02-13 RX ADMIN — INSULIN ASPART SCH UNITS: 100 INJECTION, SOLUTION INTRAVENOUS; SUBCUTANEOUS at 12:23

## 2017-02-13 RX ADMIN — METOPROLOL TARTRATE SCH MG: 25 TABLET, FILM COATED ORAL at 21:00

## 2017-02-13 RX ADMIN — ASPIRIN 81 MG SCH MG: 81 TABLET ORAL at 08:51

## 2017-02-13 RX ADMIN — METOCLOPRAMIDE HYDROCHLORIDE SCH MG: 5 SOLUTION ORAL at 01:05

## 2017-02-13 RX ADMIN — METOCLOPRAMIDE HYDROCHLORIDE SCH MG: 5 SOLUTION ORAL at 12:22

## 2017-02-13 RX ADMIN — SITAGLIPTIN SCH MG: 50 TABLET, FILM COATED ORAL at 06:37

## 2017-02-13 RX ADMIN — METOPROLOL TARTRATE SCH MG: 25 TABLET, FILM COATED ORAL at 08:50

## 2017-02-13 RX ADMIN — INSULIN ASPART SCH UNITS: 100 INJECTION, SOLUTION INTRAVENOUS; SUBCUTANEOUS at 17:49

## 2017-02-13 RX ADMIN — INSULIN ASPART SCH UNITS: 100 INJECTION, SOLUTION INTRAVENOUS; SUBCUTANEOUS at 00:56

## 2017-02-13 RX ADMIN — METOCLOPRAMIDE HYDROCHLORIDE SCH MG: 5 SOLUTION ORAL at 17:48

## 2017-02-13 RX ADMIN — ALBUTEROL SULFATE SCH MG: 2.5 SOLUTION RESPIRATORY (INHALATION) at 19:33

## 2017-02-13 RX ADMIN — INSULIN ASPART SCH UNITS: 100 INJECTION, SOLUTION INTRAVENOUS; SUBCUTANEOUS at 06:39

## 2017-02-13 RX ADMIN — DEXTROSE MONOHYDRATE SCH MLS/HR: 50 INJECTION, SOLUTION INTRAVENOUS at 21:11

## 2017-02-13 RX ADMIN — DEXTROSE MONOHYDRATE SCH MLS/HR: 50 INJECTION, SOLUTION INTRAVENOUS at 13:20

## 2017-02-13 NOTE — GENERAL PROGRESS NOTE
Assessment/Plan


Status:  stable


Status Narrative





labs OK


Assessment/Plan


uti ? 


pneumonia


dm ooc


htn 


dementia


asp prone








plan:





Antibiotics


DC IV Fluids


Pulmonary toilet-


BP and BS control


per orders





Subjective


ROS Limited/Unobtainable:  No


Constitutional:  Reports: malaise, weakness


Allergies:  


Coded Allergies:  


     No Known Allergies (Unverified , 3/23/16)





Objective





Last 24 Hour Vital Signs








  Date Time  Temp Pulse Resp B/P Pulse Ox O2 Delivery O2 Flow Rate FiO2


 


2/13/17 12:15 97.9 79 21 112/59 97 Room Air  


 


2/13/17 08:50  78  105/55    


 


2/13/17 07:54 98.3 78 20 105/55 99 Room Air  


 


2/13/17 04:00 97.9 71 18 117/62 99 Room Air  


 


2/13/17 00:00 98.6 72 18 126/63 99 Room Air  


 


2/12/17 21:00  65  109/65    


 


2/12/17 20:00 97.3 65 16 109/65 98 Room Air  


 


2/12/17 16:00 96.8 66 16 101/55 98 Room Air  

















Intake and Output  


 


 2/12/17 2/13/17





 19:00 07:00


 


Intake Total 810.0 ml 450 ml


 


Output Total 500 ml 900 ml


 


Balance 310.0 ml -450 ml


 


  


 


Intake Oral 0 ml 


 


Free Water 200 ml 100 ml


 


IV Total 260.0 ml 150 ml


 


Tube Feeding 350 ml 200 ml


 


Output Urine Total 500 ml 900 ml


 


# Bowel Movements 3 








Laboratory Tests


2/13/17 06:40: 


White Blood Count 5.0, Red Blood Count 3.92L, Hemoglobin 12.3L, Hematocrit 36.2L

, Mean Corpuscular Volume 92, Mean Corpuscular Hemoglobin 31.5H, Mean 

Corpuscular Hemoglobin Concent 34.1, Red Cell Distribution Width 13.0, Platelet 

Count 82L, Mean Platelet Volume 8.9, Neutrophils (%) (Auto) , Lymphocytes (%) (

Auto) , Monocytes (%) (Auto) , Eosinophils (%) (Auto) , Basophils (%) (Auto) , 

Differential Total Cells Counted 100, Neutrophils % (Manual) 59, Lymphocytes % (

Manual) 29, Monocytes % (Manual) 5, Eosinophils % (Manual) 6H, Basophils % (

Manual) 1, Band Neutrophils 0, Platelet Estimate DecreasedL, Platelet 

Morphology Normal, Red Blood Cell Morphology Normal, Sodium Level 141, 

Potassium Level 3.8, Chloride Level 103, Carbon Dioxide Level 26, Anion Gap 12, 

Blood Urea Nitrogen 12, Creatinine 0.8, Estimat Glomerular Filtration Rate , 

Glucose Level 148H, Hemoglobin A1c 8.6H, Uric Acid 2.3L, Calcium Level 8.8, 

Phosphorus Level 2.6, Magnesium Level 1.7, Total Bilirubin 1.1, Direct 

Bilirubin 0.2, Aspartate Amino Transf (AST/SGOT) 13, Alanine Aminotransferase (

ALT/SGPT) 17, Alkaline Phosphatase 62, C-Reactive Protein, Quantitative 5.0H, 

Pro-B-Type Natriuretic Peptide 237, Total Protein 5.8L, Albumin 2.7L, Globulin 

3.1, Albumin/Globulin Ratio 0.8L, Triglycerides Level 64, Cholesterol Level 66, 

LDL Cholesterol 29L, HDL Cholesterol 24, Cholesterol/HDL Ratio 2.8L, Thyroid 

Stimulating Hormone (TSH) 1.530


Height (Feet):  5


Height (Inches):  11.00


Weight (Pounds):  17


General Appearance:  no apparent distress


Neck:  stiff neck


Abdomen:  soft, other - GT in


Objective


other PE not changed











NAY CALDWELL Feb 13, 2017 12:24

## 2017-02-13 NOTE — INFECTIOUS DISEASES PROG NOTE
Assessment/Plan


Assessment/Plan





ASSESSMENT:  86 y/o male with:





// Probable PNA - SCx pending


    - CXR 2/11: Low lung volumes with probable basilar atelectasis and 

scattered changes of chronic lung disease. No evidence of pneumonia or overt 

vascular congestion. 


    - negative: influenza


// Probable UTI - UCx pending


// Negative C.difficile


// Fever - resolved, no leukocytosis








// DM2- HbA1c 8.6%


// CKD


// Dementia


// NKDA


// DNR/I








PLAN:





- continue empiric Zosyn d# 2 


- f/u cultures


- Monitor CBC, temperatures


- Monitor BMP


- Monitor CXR





Subjective


Allergies:  


Coded Allergies:  


     No Known Allergies (Unverified , 3/23/16)


Subjective





remains afebrile. comfortable





Objective


Vital Signs





Last 24 Hour Vital Signs








  Date Time  Temp Pulse Resp B/P Pulse Ox O2 Delivery O2 Flow Rate FiO2


 


2/13/17 12:15 97.9 79 21 112/59 97 Room Air  


 


2/13/17 08:50  78  105/55    


 


2/13/17 07:54 98.3 78 20 105/55 99 Room Air  


 


2/13/17 04:00 97.9 71 18 117/62 99 Room Air  


 


2/13/17 00:00 98.6 72 18 126/63 99 Room Air  


 


2/12/17 21:00  65  109/65    


 


2/12/17 20:00 97.3 65 16 109/65 98 Room Air  


 


2/12/17 16:00 96.8 66 16 101/55 98 Room Air  








Height (Feet):  5


Height (Inches):  11.00


Weight (Pounds):  17


General Appearance:  no acute distress


Respiratory/Chest:  no respiratory distress


Cardiovascular:  normal rate, regular rhythm


Abdomen:  normal bowel sounds, soft, non tender, non distended





Microbiology








 Date/Time


Source Procedure


Growth Status


 


 


 2/11/17 14:30


Blood Blood Culture - Preliminary


NO GROWTH AFTER 24 HOURS Resulted


 


 2/11/17 14:20


Blood Blood Culture - Preliminary


NO GROWTH AFTER 24 HOURS Resulted





 2/12/17 13:00


Sputum Gram Stain - Final Resulted


 


 2/12/17 13:00


Sputum Sputum Culture


Pending Resulted


 


 2/11/17 14:27


Nasal Nares MRSA Culture - Final


NO METHICILLIN RESISTANT STAPH AUREUS... Complete


 


 2/11/17 14:27


Nasal Nares Influenza Types A,B Antigen (LINO) - Final Complete


 


 2/12/17 10:45


Stool Clostridium difficile Toxin Assay - Final Complete


 


 2/11/17 15:20


Urine,Clean Catch Urine Culture - Preliminary Resulted


 


 2/11/17 14:27


Rectum VRE Culture - Final


NO VANCOMYCIN RESISTANT ENTEROCOCCUS ... Complete











Laboratory Tests








Test


  2/13/17


06:40


 


White Blood Count


  5.0 K/UL


(4.8-10.8)


 


Red Blood Count


  3.92 M/UL


(4.70-6.10)  L


 


Hemoglobin


  12.3 G/DL


(14.2-18.0)  L


 


Hematocrit


  36.2 %


(42.0-52.0)  L


 


Mean Corpuscular Volume 92 FL (80-99)  


 


Mean Corpuscular Hemoglobin


  31.5 PG


(27.0-31.0)  H


 


Mean Corpuscular Hemoglobin


Concent 34.1 G/DL


(32.0-36.0)


 


Red Cell Distribution Width


  13.0 %


(11.6-14.8)


 


Platelet Count


  82 K/UL


(150-450)  L


 


Mean Platelet Volume


  8.9 FL


(6.5-10.1)


 


Neutrophils (%) (Auto)


  % (45.0-75.0)


 


 


Lymphocytes (%) (Auto)


  % (20.0-45.0)


 


 


Monocytes (%) (Auto)  % (1.0-10.0)  


 


Eosinophils (%) (Auto)  % (0.0-3.0)  


 


Basophils (%) (Auto)  % (0.0-2.0)  


 


Differential Total Cells


Counted 100  


 


 


Neutrophils % (Manual) 59 % (45-75)  


 


Lymphocytes % (Manual) 29 % (20-45)  


 


Monocytes % (Manual) 5 % (1-10)  


 


Eosinophils % (Manual) 6 % (0-3)  H


 


Basophils % (Manual) 1 % (0-2)  


 


Band Neutrophils 0 % (0-8)  


 


Platelet Estimate Decreased  L


 


Platelet Morphology Normal  


 


Red Blood Cell Morphology Normal  


 


Sodium Level


  141 mEQ/L


(135-145)


 


Potassium Level


  3.8 mEQ/L


(3.4-4.9)


 


Chloride Level


  103 mEQ/L


()


 


Carbon Dioxide Level


  26 mEQ/L


(20-30)


 


Anion Gap 12 (5-15)  


 


Blood Urea Nitrogen


  12 mg/dL


(7-23)


 


Creatinine


  0.8 mg/dL


(0.7-1.2)


 


Estimat Glomerular Filtration


Rate  mL/min (>60)  


 


 


Glucose Level


  148 mg/dL


()  H


 


Hemoglobin A1c


  8.6 % (< 6.0)


H


 


Uric Acid


  2.3 mg/dL


(3.0-7.5)  L


 


Calcium Level


  8.8 mg/dL


(8.6-10.2)


 


Phosphorus Level


  2.6 mg/dL


(2.5-4.8)


 


Magnesium Level


  1.7 mg/dL


(1.7-2.5)


 


Total Bilirubin


  1.1 mg/dL


(0.0-1.2)


 


Direct Bilirubin


  0.2 mg/dL


(0.1-0.3)


 


Aspartate Amino Transf


(AST/SGOT) 13 U/L (5-40)  


 


 


Alanine Aminotransferase


(ALT/SGPT) 17 U/L (3-41)  


 


 


Alkaline Phosphatase


  62 U/L


()


 


C-Reactive Protein,


Quantitative 5.0 mg/dL (<


0.5)  H


 


Pro-B-Type Natriuretic Peptide


  237 pg/mL


(0-450)


 


Total Protein


  5.8 g/dL


(6.6-8.7)  L


 


Albumin


  2.7 g/dL


(3.5-5.2)  L


 


Globulin 3.1 g/dL  


 


Albumin/Globulin Ratio


  0.8 (1.0-2.7)


L


 


Triglycerides Level


  64 mg/dL (<


150)


 


Cholesterol Level


  66 mg/dL (<


200)


 


LDL Cholesterol


  29 mg/dL


(60-99)  L


 


HDL Cholesterol


  24 mg/dL (>


60)


 


Cholesterol/HDL Ratio


  2.8 (3.3-4.4)


L


 


Thyroid Stimulating Hormone


(TSH) 1.530 uIU/mL


(0.300-4.500)











Current Medications








 Medications


  (Trade)  Dose


 Ordered  Sig/Shasta


 Route


 PRN Reason  Start Time


 Stop Time Status Last Admin


Dose Admin


 


 Acetaminophen


  (Tylenol)  650 mg  Q6H  PRN


 GT


 Prn Headache/Temp > 101  2/11/17 18:45


 3/13/17 18:44   


 


 


 Albuterol Sulfate


  (Proventil)  2.5 mg  TIDRT


 HHN


   2/13/17 13:00


 2/18/17 12:59   


 


 


 Aspirin 81 mg  81 mg  DAILY


 GT


   2/12/17 09:00


 3/14/17 08:59  2/13/17 08:51


 


 


 Atorvastatin


 Calcium


  (Lipitor)  10 mg  BEDTIME


 GT


   2/11/17 22:00


 3/13/17 21:59  2/12/17 21:02


 


 


 Dextrose


  (Dextrose 50%)    STAT  PRN


 IV


 Hypoglycemia  2/12/17 10:30


 3/14/17 10:29   


 


 


 Insulin Aspart


  (NovoLOG)    Q6HR


 SUBQ


   2/12/17 18:00


 3/14/17 17:59  2/13/17 12:23


 


 


 Metoclopramide HCl


  (Reglan)  5 mg  EVERY 6  HOURS


 GT


   2/12/17 00:00


 3/14/17 00:00  2/13/17 12:22


 


 


 Metoprolol


 Tartrate


  (Lopressor)  12.5 mg  EVERY 12  HOURS


 GT


   2/11/17 21:00


 3/13/17 20:59  2/12/17 11:39


 


 


 Piperacillin Sod/


 Tazobactam Sod/


 Sodium Chloride


  (Zosyn/Sodium


 Chloride)  110 ml @ 


 27.5 mls/hr  Q8HR


 IVPB


   2/11/17 22:00


 2/18/17 21:59  2/13/17 13:20


 


 


 Ranitidine HCl


  (Zantac)  150 mg  TWICE A  DAY


 GT


   2/12/17 09:00


 3/14/17 08:59  2/13/17 08:51


 


 


 Sitagliptin


 Phosphate


  (Januvia)  50 mg  ACBREAKFAST


 GT


   2/12/17 06:30


 3/14/17 06:29  2/13/17 06:37


 

















SANDRA WRIGHT Feb 13, 2017 15:53

## 2017-02-13 NOTE — HISTORY AND PHYSICAL REPORT
DATE OF ADMISSION:  02/11/2017



HISTORY OF PRESENT ILLNESS:  The patient was seen in emergency room

on 02/11/2017.  The patient is an 87-year-old, [facility] resident, presented with cough and some degree of respiratory

distress and hypoxia.  Seen in the emergency room by Dr. Ballard and was

diagnosed to have urinary tract infection, hypoxia, and possibly pneumonia

and the patient is admitted for further management.



PAST MEDICAL HISTORY:  Past history of the patient is significant for

dementia, previous PEG insertion, anemia, history of urinary tract

infection, history of diabetes mellitus, osteoarthritis, hypertension, and

previous dehydration.



MEDICATIONS:  According to the list.



PHYSICAL EXAMINATION:

VITAL SIGNS:  When the patient is seen in emergency room, the

temperature was 98.4, pulse rate 81, respiratory rate 22, and blood

pressure 120/52.

GENERAL:  The patient is lethargic.  Face is pale.

NECK:  Rigid to all directions.

LUNGS:  Poor inspiratory effort.  Decreased breath sound over the

bases.

HEART:  Regular.  Slightly tachycardic.

ABDOMEN:  Soft.  The GT tube in place.

EXTREMITIES:  Lower extremities, no edema.



LABORATORY RESULTS:  Albumin 2.9.  White blood cells 6.7 and

hemoglobin 12.8.  The urine had too numerous to count white blood cells

and 5 to 10 RBCs.  A chest x-ray was suggestive of low volume, basal

atelectasis changes, and chronic lung disease, questionable pneumonitis

versus vascular congestion.



IMPRESSION:  The patient is an 87-year-old male who is being admitted

with the fever, hypoxia, abnormal chest x-ray, and also evidence of

urinary tract infection.  His other conditions are diabetes mellitus,

hypertension, dementia, and the patient is aspiration prone.



PLAN:  The patient is on breathing treatment and antibiotics.

Pulmonary ID consultant is sought.  According to how the patient's

condition evolves, we will make the proper changes in our future

management.









  ______________________________________________

  Nish Schilling M.D.





DR:  DARLENE

D:  02/12/2017 16:44

T:  02/13/2017 00:18

JOB#:  [id number]

CC:

## 2017-02-13 NOTE — PULMONOLOGY PROGRESS NOTE
Assessment/Plan


Problems:  


(1) Pneumonia


(2) Hypoxia


(3) UTI (urinary tract infection)


(4) Diabetes mellitus


(5) Failure to thrive


(6) Feeding by G-tube


(7) Bedridden


(8) Dementia


Assessment/Plan


improving


less cough


tolerating diet


check cultures


continue antibiotics


chest pt


off iv fluids


check electrolytes


dvt prophylaxis





Subjective


ROS Limited/Unobtainable:  No


Constitutional:  Reports: no symptoms


HEENT:  Repors: no symptoms


Respiratory:  Reports: no symptoms


Allergies:  


Coded Allergies:  


     No Known Allergies (Unverified , 3/23/16)





Objective





Last 24 Hour Vital Signs








  Date Time  Temp Pulse Resp B/P Pulse Ox O2 Delivery O2 Flow Rate FiO2


 


2/13/17 15:49 97.7 75 20 111/51 97 Room Air  


 


2/13/17 12:15 97.9 79 21 112/59 97 Room Air  


 


2/13/17 08:50  78  105/55    


 


2/13/17 07:54 98.3 78 20 105/55 99 Room Air  


 


2/13/17 04:00 97.9 71 18 117/62 99 Room Air  


 


2/13/17 00:00 98.6 72 18 126/63 99 Room Air  


 


2/12/17 21:00  65  109/65    


 


2/12/17 20:00 97.3 65 16 109/65 98 Room Air  

















Intake and Output  


 


 2/12/17 2/13/17





 19:00 07:00


 


Intake Total 810.0 ml 500 ml


 


Output Total 500 ml 900 ml


 


Balance 310.0 ml -400 ml


 


  


 


Intake Oral 0 ml 


 


Free Water 200 ml 100 ml


 


IV Total 260.0 ml 150 ml


 


Tube Feeding 350 ml 250 ml


 


Output Urine Total 500 ml 900 ml


 


# Bowel Movements 3 








General Appearance:  WD/WN


HEENT:  normocephalic, atraumatic


Respiratory/Chest:  chest wall non-tender, lungs clear


Cardiovascular:  normal peripheral pulses, normal rate


Abdomen:  normal bowel sounds, soft, non tender


Neurologic/Psychiatric:  CNs II-XII grossly normal, no motor/sensory deficits


Lymphatic:  no neck adenopathy





Microbiology








 Date/Time


Source Procedure


Growth Status


 


 


 2/11/17 14:30


Blood Blood Culture - Preliminary


NO GROWTH AFTER 24 HOURS Resulted


 


 2/11/17 14:20


Blood Blood Culture - Preliminary


NO GROWTH AFTER 24 HOURS Resulted





 2/12/17 13:00


Sputum Gram Stain - Final Resulted


 


 2/12/17 13:00


Sputum Sputum Culture


Pending Resulted


 


 2/11/17 14:27


Nasal Nares MRSA Culture - Final


NO METHICILLIN RESISTANT STAPH AUREUS... Complete


 


 2/11/17 14:27


Nasal Nares Influenza Types A,B Antigen (LINO) - Final Complete


 


 2/12/17 10:45


Stool Clostridium difficile Toxin Assay - Final Complete


 


 2/11/17 15:20


Urine,Clean Catch Urine Culture - Preliminary Resulted


 


 2/11/17 14:27


Rectum VRE Culture - Final


NO VANCOMYCIN RESISTANT ENTEROCOCCUS ... Complete








Laboratory Tests


2/13/17 06:40: 


White Blood Count 5.0, Red Blood Count 3.92L, Hemoglobin 12.3L, Hematocrit 36.2L

, Mean Corpuscular Volume 92, Mean Corpuscular Hemoglobin 31.5H, Mean 

Corpuscular Hemoglobin Concent 34.1, Red Cell Distribution Width 13.0, Platelet 

Count 82L, Mean Platelet Volume 8.9, Neutrophils (%) (Auto) , Lymphocytes (%) (

Auto) , Monocytes (%) (Auto) , Eosinophils (%) (Auto) , Basophils (%) (Auto) , 

Differential Total Cells Counted 100, Neutrophils % (Manual) 59, Lymphocytes % (

Manual) 29, Monocytes % (Manual) 5, Eosinophils % (Manual) 6H, Basophils % (

Manual) 1, Band Neutrophils 0, Platelet Estimate DecreasedL, Platelet 

Morphology Normal, Red Blood Cell Morphology Normal, Sodium Level 141, 

Potassium Level 3.8, Chloride Level 103, Carbon Dioxide Level 26, Anion Gap 12, 

Blood Urea Nitrogen 12, Creatinine 0.8, Estimat Glomerular Filtration Rate , 

Glucose Level 148H, Hemoglobin A1c 8.6H, Uric Acid 2.3L, Calcium Level 8.8, 

Phosphorus Level 2.6, Magnesium Level 1.7, Total Bilirubin 1.1, Direct 

Bilirubin 0.2, Aspartate Amino Transf (AST/SGOT) 13, Alanine Aminotransferase (

ALT/SGPT) 17, Alkaline Phosphatase 62, C-Reactive Protein, Quantitative 5.0H, 

Pro-B-Type Natriuretic Peptide 237, Total Protein 5.8L, Albumin 2.7L, Globulin 

3.1, Albumin/Globulin Ratio 0.8L, Triglycerides Level 64, Cholesterol Level 66, 

LDL Cholesterol 29L, HDL Cholesterol 24, Cholesterol/HDL Ratio 2.8L, Thyroid 

Stimulating Hormone (TSH) 1.530





Current Medications








 Medications


  (Trade)  Dose


 Ordered  Sig/Shasta


 Route


 PRN Reason  Start Time


 Stop Time Status Last Admin


Dose Admin


 


 Acetaminophen


  (Tylenol)  650 mg  Q6H  PRN


 GT


 Prn Headache/Temp > 101  2/11/17 18:45


 3/13/17 18:44   


 


 


 Albuterol Sulfate


  (Proventil)  2.5 mg  TIDRT


 HHN


   2/13/17 13:00


 2/18/17 12:59   


 


 


 Aspirin 81 mg  81 mg  DAILY


 GT


   2/12/17 09:00


 3/14/17 08:59  2/13/17 08:51


 


 


 Atorvastatin


 Calcium


  (Lipitor)  10 mg  BEDTIME


 GT


   2/11/17 22:00


 3/13/17 21:59  2/12/17 21:02


 


 


 Dextrose


  (Dextrose 50%)    STAT  PRN


 IV


 Hypoglycemia  2/12/17 10:30


 3/14/17 10:29   


 


 


 Insulin Aspart


  (NovoLOG)    Q6HR


 SUBQ


   2/12/17 18:00


 3/14/17 17:59  2/13/17 12:23


 


 


 Metoclopramide HCl


  (Reglan)  5 mg  EVERY 6  HOURS


 GT


   2/12/17 00:00


 3/14/17 00:00  2/13/17 12:22


 


 


 Metoprolol


 Tartrate


  (Lopressor)  12.5 mg  EVERY 12  HOURS


 GT


   2/11/17 21:00


 3/13/17 20:59  2/12/17 11:39


 


 


 Piperacillin Sod/


 Tazobactam Sod/


 Sodium Chloride


  (Zosyn/Sodium


 Chloride)  110 ml @ 


 27.5 mls/hr  Q8HR


 IVPB


   2/11/17 22:00


 2/18/17 21:59  2/13/17 13:20


 


 


 Ranitidine HCl


  (Zantac)  150 mg  TWICE A  DAY


 GT


   2/12/17 09:00


 3/14/17 08:59  2/13/17 08:51


 


 


 Sitagliptin


 Phosphate


  (Januvia)  50 mg  ACBREAKFAST


 GT


   2/12/17 06:30


 3/14/17 06:29  2/13/17 06:37


 

















JOAN DEMARCO Feb 13, 2017 17:14

## 2017-02-14 VITALS — DIASTOLIC BLOOD PRESSURE: 78 MMHG | SYSTOLIC BLOOD PRESSURE: 115 MMHG

## 2017-02-14 VITALS — SYSTOLIC BLOOD PRESSURE: 109 MMHG | DIASTOLIC BLOOD PRESSURE: 65 MMHG

## 2017-02-14 VITALS — DIASTOLIC BLOOD PRESSURE: 65 MMHG | SYSTOLIC BLOOD PRESSURE: 123 MMHG

## 2017-02-14 VITALS — DIASTOLIC BLOOD PRESSURE: 51 MMHG | SYSTOLIC BLOOD PRESSURE: 109 MMHG

## 2017-02-14 VITALS — DIASTOLIC BLOOD PRESSURE: 63 MMHG | SYSTOLIC BLOOD PRESSURE: 123 MMHG

## 2017-02-14 VITALS — DIASTOLIC BLOOD PRESSURE: 52 MMHG | SYSTOLIC BLOOD PRESSURE: 100 MMHG

## 2017-02-14 RX ADMIN — METOPROLOL TARTRATE SCH MG: 25 TABLET, FILM COATED ORAL at 20:29

## 2017-02-14 RX ADMIN — SITAGLIPTIN SCH MG: 50 TABLET, FILM COATED ORAL at 06:06

## 2017-02-14 RX ADMIN — METOCLOPRAMIDE HYDROCHLORIDE SCH MG: 5 SOLUTION ORAL at 00:09

## 2017-02-14 RX ADMIN — INSULIN ASPART SCH UNITS: 100 INJECTION, SOLUTION INTRAVENOUS; SUBCUTANEOUS at 06:04

## 2017-02-14 RX ADMIN — METOPROLOL TARTRATE SCH MG: 25 TABLET, FILM COATED ORAL at 08:54

## 2017-02-14 RX ADMIN — METOCLOPRAMIDE HYDROCHLORIDE SCH MG: 5 SOLUTION ORAL at 12:05

## 2017-02-14 RX ADMIN — METOCLOPRAMIDE HYDROCHLORIDE SCH MG: 5 SOLUTION ORAL at 05:31

## 2017-02-14 RX ADMIN — INSULIN ASPART SCH UNITS: 100 INJECTION, SOLUTION INTRAVENOUS; SUBCUTANEOUS at 12:05

## 2017-02-14 RX ADMIN — METOCLOPRAMIDE HYDROCHLORIDE SCH MG: 5 SOLUTION ORAL at 18:53

## 2017-02-14 RX ADMIN — ALBUTEROL SULFATE SCH MG: 2.5 SOLUTION RESPIRATORY (INHALATION) at 12:20

## 2017-02-14 RX ADMIN — INSULIN ASPART SCH UNITS: 100 INJECTION, SOLUTION INTRAVENOUS; SUBCUTANEOUS at 00:08

## 2017-02-14 RX ADMIN — ASPIRIN 81 MG SCH MG: 81 TABLET ORAL at 08:55

## 2017-02-14 RX ADMIN — ALBUTEROL SULFATE SCH MG: 2.5 SOLUTION RESPIRATORY (INHALATION) at 06:56

## 2017-02-14 RX ADMIN — DEXTROSE MONOHYDRATE SCH MLS/HR: 50 INJECTION, SOLUTION INTRAVENOUS at 04:57

## 2017-02-14 RX ADMIN — DEXTROSE MONOHYDRATE SCH MLS/HR: 50 INJECTION, SOLUTION INTRAVENOUS at 20:30

## 2017-02-14 RX ADMIN — INSULIN ASPART SCH UNITS: 100 INJECTION, SOLUTION INTRAVENOUS; SUBCUTANEOUS at 19:02

## 2017-02-14 RX ADMIN — DEXTROSE MONOHYDRATE SCH MLS/HR: 50 INJECTION, SOLUTION INTRAVENOUS at 15:31

## 2017-02-14 RX ADMIN — ALBUTEROL SULFATE SCH MG: 2.5 SOLUTION RESPIRATORY (INHALATION) at 19:55

## 2017-02-14 NOTE — INFECTIOUS DISEASES PROG NOTE
Assessment/Plan


Assessment/Plan





ASSESSMENT:  86 y/o male with:





// Probable PNA - SCx NRF


    - CXR 2/11: Low lung volumes with probable basilar atelectasis and 

scattered changes of chronic lung disease. No evidence of pneumonia or overt 

vascular congestion. 


    - negative: influenza


// Probable UTI - UCx yeast


// Negative C.difficile


// Fever - resolved, no leukocytosis








// DM2- HbA1c 8.6%


// CKD


// Dementia


// NKDA


// DNR/I








PLAN:





- continue empiric Zosyn d# 3 / 7, fluconazole d# 1 / 7


- f/u cultures


- Monitor CBC, temperatures


- Monitor BMP


- Monitor CXR





Subjective


Allergies:  


Coded Allergies:  


     No Known Allergies (Unverified , 3/23/16)


Subjective





remains afebrile. comfortable





Objective


Vital Signs





Last 24 Hour Vital Signs








  Date Time  Temp Pulse Resp B/P Pulse Ox O2 Delivery O2 Flow Rate FiO2


 


2/14/17 12:21  84 18  100 Room Air  


 


2/14/17 12:16  82 18  99 Room Air  


 


2/14/17 12:13 97.9 82 21 109/65 95 Room Air  


 


2/14/17 08:54  99  109/51    


 


2/14/17 08:15 98.4 99 21 109/51 97 Room Air  


 


2/14/17 06:55  78 18  100 Room Air  


 


2/14/17 06:50  79 18  97 Room Air  


 


2/14/17 04:00 98.6 75 18 123/65 97 Room Air  


 


2/14/17 00:00 97.3 79 20 115/78 95 Room Air  


 


2/13/17 21:00  73  112/61    


 


2/13/17 19:36  73 16  97 Room Air  


 


2/13/17 19:35  75 16  95 Room Air  


 


2/13/17 19:34  70 16   Room Air  


 


2/13/17 19:00 97.9 85 20 112/61 96 Room Air  


 


2/13/17 15:49 97.7 75 20 111/51 97 Room Air  








Height (Feet):  5


Height (Inches):  11.00


Weight (Pounds):  17


General Appearance:  no acute distress


Respiratory/Chest:  no respiratory distress


Cardiovascular:  normal rate, regular rhythm


Abdomen:  normal bowel sounds, soft, non tender, non distended





Microbiology








 Date/Time


Source Procedure


Growth Status


 


 


 2/11/17 14:30


Blood Blood Culture - Preliminary


NO GROWTH AFTER 48 HOURS Resulted


 


 2/11/17 14:20


Blood Blood Culture - Preliminary


NO GROWTH AFTER 48 HOURS Resulted





 2/12/17 13:00


Sputum Gram Stain - Final Complete


 


 2/12/17 13:00


Sputum Sputum Culture - Final


NORMAL UPPER RESPIRATORY SHALOM PRESENT Complete


 


 2/11/17 14:27


Nasal Nares MRSA Culture - Final


NO METHICILLIN RESISTANT STAPH AUREUS... Complete


 


 2/11/17 14:27


Nasal Nares Influenza Types A,B Antigen (LINO) - Final Complete


 


 2/12/17 10:45


Stool Clostridium difficile Toxin Assay - Final Complete


 


 2/11/17 15:20


Urine,Clean Catch Urine Culture - Preliminary


YEAST Resulted


 


 2/11/17 14:27


Rectum VRE Culture - Final


NO VANCOMYCIN RESISTANT ENTEROCOCCUS ... Complete











Current Medications








 Medications


  (Trade)  Dose


 Ordered  Sig/Shasta


 Route


 PRN Reason  Start Time


 Stop Time Status Last Admin


Dose Admin


 


 Acetaminophen


  (Tylenol)  650 mg  Q6H  PRN


 GT


 Prn Headache/Temp > 101  2/11/17 18:45


 3/13/17 18:44   


 


 


 Albuterol Sulfate


 2.5 mg  2.5 mg  TIDRT


 HHN


   2/13/17 13:00


 2/18/17 12:59  2/14/17 12:20


 


 


 Aspirin 81 mg  81 mg  DAILY


 GT


   2/12/17 09:00


 3/14/17 08:59  2/14/17 08:55


 


 


 Atorvastatin


 Calcium


  (Lipitor)  10 mg  BEDTIME


 GT


   2/11/17 22:00


 3/13/17 21:59  2/13/17 21:11


 


 


 Dextrose


  (Dextrose 50%)    STAT  PRN


 IV


 Hypoglycemia  2/12/17 10:30


 3/14/17 10:29   


 


 


 Fluconazole/


 Sodium Chloride


  (Diflucan 200mg/


 100ml Premix)  100 ml @ 


 100 mls/hr  ONCE  ONCE


 IV


   2/14/17 14:30


 2/14/17 15:29   


 


 


 Insulin Aspart


  (NovoLOG)    Q6HR


 SUBQ


   2/12/17 18:00


 3/14/17 17:59  2/14/17 12:05


 


 


 Metoclopramide HCl


  (Reglan)  5 mg  EVERY 6  HOURS


 GT


   2/12/17 00:00


 3/14/17 00:00  2/14/17 12:05


 


 


 Metoprolol


 Tartrate


  (Lopressor)  12.5 mg  EVERY 12  HOURS


 GT


   2/11/17 21:00


 3/13/17 20:59  2/12/17 11:39


 


 


 Piperacillin Sod/


 Tazobactam Sod/


 Sodium Chloride


  (Zosyn/Sodium


 Chloride)  110 ml @ 


 27.5 mls/hr  Q8HR


 IVPB


   2/11/17 22:00


 2/18/17 21:59  2/14/17 04:57


 


 


 Ranitidine HCl


  (Zantac)  150 mg  TWICE A  DAY


 GT


   2/12/17 09:00


 3/14/17 08:59  2/14/17 08:55


 


 


 Sitagliptin


 Phosphate


  (Januvia)  50 mg  ACBREAKFAST


 GT


   2/12/17 06:30


 3/14/17 06:29  2/14/17 06:06


 

















SANDRA WRIGHT Feb 14, 2017 13:34

## 2017-02-14 NOTE — GENERAL PROGRESS NOTE
Assessment/Plan


Status:  stable


Assessment/Plan


uti ? 


pneumonia


dm ooc


htn 


dementia


asp prone








plan:





Antibiotics


CXR


DC agarwal


One dose fluconozol


DC IV Fluids


Pulmonary toilet-


BP and BS control


per orders


? DC in am





Subjective


ROS Limited/Unobtainable:  No


Constitutional:  Reports: malaise


Allergies:  


Coded Allergies:  


     No Known Allergies (Unverified , 3/23/16)





Objective





Last 24 Hour Vital Signs








  Date Time  Temp Pulse Resp B/P Pulse Ox O2 Delivery O2 Flow Rate FiO2


 


2/14/17 08:54  99  109/51    


 


2/14/17 08:15 98.4 99 21 109/51 97 Room Air  


 


2/14/17 06:55  78 18  100 Room Air  


 


2/14/17 06:50  79 18  97 Room Air  


 


2/14/17 04:00 98.6 75 18 123/65 97 Room Air  


 


2/14/17 00:00 97.3 79 20 115/78 95 Room Air  


 


2/13/17 21:00  73  112/61    


 


2/13/17 19:36  73 16  97 Room Air  


 


2/13/17 19:35  75 16  95 Room Air  


 


2/13/17 19:34  70 16   Room Air  


 


2/13/17 19:00 97.9 85 20 112/61 96 Room Air  


 


2/13/17 15:49 97.7 75 20 111/51 97 Room Air  


 


2/13/17 12:15 97.9 79 21 112/59 97 Room Air  

















Intake and Output  


 


 2/13/17 2/14/17





 19:00 07:00


 


Intake Total 910.0 ml 260 ml


 


Output Total  1400 ml


 


Balance 910.0 ml -1140 ml


 


  


 


Free Water 200 ml 60 ml


 


IV Total 110.0 ml 


 


Tube Feeding 600 ml 200 ml


 


Output Urine Total  1400 ml


 


# Bowel Movements 1 1








Height (Feet):  5


Height (Inches):  11.00


Weight (Pounds):  17


General Appearance:  no apparent distress


Cardiovascular:  regular rhythm


Respiratory/Chest:  decreased breath sounds


Objective


other PE not changed











NAY CALDWELL Feb 14, 2017 11:30

## 2017-02-14 NOTE — DIAGNOSTIC IMAGING REPORT
Indications:  COUGH



Technique:  Portable AP chest



Findings:



Comparison:  2/11/2017



Heart size, pulmonary vasculature remain within normal limits. Previous right lung

base linear density has resolved. Lungs and pleura currently clear.



IMPRESSION:



No current evidence of acute cardiopulmonary disease

## 2017-02-14 NOTE — PULMONOLOGY PROGRESS NOTE
Assessment/Plan


Problems:  


(1) Pneumonia


(2) Hypoxia


(3) UTI (urinary tract infection)


(4) Diabetes mellitus


(5) Failure to thrive


(6) Feeding by G-tube


(7) Bedridden


(8) Dementia


Assessment/Plan


improving


less cough


tolerating diet


check cultures


continue antibiotics


chest pt


off iv fluids


check electrolytes


dvt prophylaxis





Subjective


ROS Limited/Unobtainable:  Yes


Constitutional:  Reports: anorexia, fatigue


Genitourinary:  Reports: dysuria, frequency, hematuria, nocturia, urgency


Neurologic:  Reports: confusion, weakness


Allergies:  


Coded Allergies:  


     No Known Allergies (Unverified , 3/23/16)





Objective





Last 24 Hour Vital Signs








  Date Time  Temp Pulse Resp B/P Pulse Ox O2 Delivery O2 Flow Rate FiO2


 


2/14/17 16:00 97.2 74 18 100/52 97 Room Air  


 


2/14/17 12:21  84 18  100 Room Air  


 


2/14/17 12:16  82 18  99 Room Air  


 


2/14/17 12:13 97.9 82 21 109/65 95 Room Air  


 


2/14/17 08:54  99  109/51    


 


2/14/17 08:15 98.4 99 21 109/51 97 Room Air  


 


2/14/17 06:55  78 18  100 Room Air  


 


2/14/17 06:50  79 18  97 Room Air  


 


2/14/17 04:00 98.6 75 18 123/65 97 Room Air  


 


2/14/17 00:00 97.3 79 20 115/78 95 Room Air  


 


2/13/17 21:00  73  112/61    


 


2/13/17 19:36  73 16  97 Room Air  


 


2/13/17 19:35  75 16  95 Room Air  


 


2/13/17 19:34  70 16   Room Air  


 


2/13/17 19:00 97.9 85 20 112/61 96 Room Air  

















Intake and Output  


 


 2/13/17 2/14/17





 19:00 07:00


 


Intake Total 910.0 ml 260 ml


 


Output Total  1400 ml


 


Balance 910.0 ml -1140 ml


 


  


 


Free Water 200 ml 60 ml


 


IV Total 110.0 ml 


 


Tube Feeding 600 ml 200 ml


 


Output Urine Total  1400 ml


 


# Bowel Movements 1 1








General Appearance:  no acute distress


HEENT:  normocephalic, atraumatic, PERRL


Respiratory/Chest:  chest wall non-tender, decreased breath sounds, accessory 

muscle use, rhonchi


Cardiovascular:  normal peripheral pulses, normal rate, regular rhythm, no JVD


Abdomen:  normal bowel sounds, soft, non tender, no organomegaly


Genitourinary:  normal external genitalia


Extremities:  no cyanosis


Skin:  rash, lesions


Neurologic/Psychiatric:  responsive, disoriented, depressed affect





Microbiology








 Date/Time


Source Procedure


Growth Status


 


 


 2/12/17 13:00


Sputum Gram Stain - Final Complete


 


 2/12/17 13:00


Sputum Sputum Culture - Final


NORMAL UPPER RESPIRATORY SHALOM PRESENT Complete


 


 2/12/17 10:45


Stool Clostridium difficile Toxin Assay - Final Complete











Current Medications








 Medications


  (Trade)  Dose


 Ordered  Sig/Shasta


 Route


 PRN Reason  Start Time


 Stop Time Status Last Admin


Dose Admin


 


 Acetaminophen


  (Tylenol)  650 mg  Q6H  PRN


 GT


 Prn Headache/Temp > 101  2/11/17 18:45


 3/13/17 18:44   


 


 


 Albuterol Sulfate


  (Proventil)  2.5 mg  TIDRT


 HHN


   2/13/17 13:00


 2/18/17 12:59  2/14/17 12:20


 


 


 Aspirin 81 mg  81 mg  DAILY


 GT


   2/12/17 09:00


 3/14/17 08:59  2/14/17 08:55


 


 


 Atorvastatin


 Calcium


  (Lipitor)  10 mg  BEDTIME


 GT


   2/11/17 22:00


 3/13/17 21:59  2/13/17 21:11


 


 


 Dextrose


  (Dextrose 50%)    STAT  PRN


 IV


 Hypoglycemia  2/12/17 10:30


 3/14/17 10:29   


 


 


 Insulin Aspart


  (NovoLOG)    Q6HR


 SUBQ


   2/12/17 18:00


 3/14/17 17:59  2/14/17 12:05


 


 


 Metoclopramide HCl


  (Reglan)  5 mg  EVERY 6  HOURS


 GT


   2/12/17 00:00


 3/14/17 00:00  2/14/17 12:05


 


 


 Metoprolol


 Tartrate


  (Lopressor)  12.5 mg  EVERY 12  HOURS


 GT


   2/11/17 21:00


 3/13/17 20:59  2/12/17 11:39


 


 


 Piperacillin Sod/


 Tazobactam Sod/


 Sodium Chloride


  (Zosyn/Sodium


 Chloride)  110 ml @ 


 27.5 mls/hr  Q8HR


 IVPB


   2/11/17 22:00


 2/18/17 21:59  2/14/17 15:31


 


 


 Ranitidine HCl


  (Zantac)  150 mg  TWICE A  DAY


 GT


   2/12/17 09:00


 3/14/17 08:59  2/14/17 08:55


 


 


 Sitagliptin


 Phosphate


  (Januvia)  50 mg  ACBREAKFAST


 GT


   2/12/17 06:30


 3/14/17 06:29  2/14/17 06:06


 

















JOAN DEMARCO Feb 14, 2017 17:30

## 2017-02-15 VITALS — DIASTOLIC BLOOD PRESSURE: 77 MMHG | SYSTOLIC BLOOD PRESSURE: 131 MMHG

## 2017-02-15 VITALS — SYSTOLIC BLOOD PRESSURE: 138 MMHG | DIASTOLIC BLOOD PRESSURE: 81 MMHG

## 2017-02-15 VITALS — DIASTOLIC BLOOD PRESSURE: 68 MMHG | SYSTOLIC BLOOD PRESSURE: 115 MMHG

## 2017-02-15 VITALS — DIASTOLIC BLOOD PRESSURE: 68 MMHG | SYSTOLIC BLOOD PRESSURE: 135 MMHG

## 2017-02-15 LAB
ALBUMIN/GLOB SERPL: 0.8 {RATIO} (ref 1–2.7)
ALT SERPL-CCNC: 12 U/L (ref 3–41)
ANION GAP SERPL CALC-SCNC: 13 MMOL/L (ref 5–15)
AST SERPL-CCNC: 11 U/L (ref 5–40)
BASOPHILS NFR BLD MANUAL: 0 % (ref 0–2)
CALCIUM SERPL-MCNC: 9.1 MG/DL (ref 8.6–10.2)
CHLORIDE SERPL-SCNC: 103 MEQ/L (ref 98–107)
CO2 SERPL-SCNC: 27 MEQ/L (ref 20–30)
CREAT SERPL-MCNC: 0.7 MG/DL (ref 0.7–1.2)
CRP SERPL-MCNC: 1.6 MG/DL (ref ?–0.5)
EOSINOPHIL NFR BLD MANUAL: 6 % (ref 0–3)
ERYTHROCYTE [DISTWIDTH] IN BLOOD BY AUTOMATED COUNT: 12.3 % (ref 11.6–14.8)
GLOBULIN SER-MCNC: 3.2 G/DL
HEMOLYSIS: 6
MCH RBC QN AUTO: 31.3 PG (ref 27–31)
MCHC RBC AUTO-ENTMCNC: 33.6 G/DL (ref 32–36)
MCV RBC AUTO: 93 FL (ref 80–99)
NEUTS BAND NFR BLD MANUAL: 0 % (ref 0–8)
NEUTS BAND NFR BLD MANUAL: 48 % (ref 45–75)
PLAT MORPH BLD: NORMAL
PLATELET # BLD EST: (no result) 10*3/UL
PLATELET # BLD: 88 K/UL (ref 150–450)
PMV BLD AUTO: 7.5 FL (ref 6.5–10.1)
POTASSIUM SERPL-SCNC: 3.7 MEQ/L (ref 3.4–4.9)
PROT SERPL-MCNC: 6 G/DL (ref 6.6–8.7)
RBC # BLD AUTO: 3.65 M/UL (ref 4.7–6.1)
RBC MORPH BLD: NORMAL
SODIUM SERPL-SCNC: 143 MEQ/L (ref 135–145)
TOTAL CELLS COUNTED BLD: 100
VARIANT LYMPHS NFR BLD MANUAL: 38 % (ref 20–45)
WBC # BLD AUTO: 4.1 K/UL (ref 4.8–10.8)

## 2017-02-15 RX ADMIN — METOCLOPRAMIDE HYDROCHLORIDE SCH MG: 5 SOLUTION ORAL at 06:25

## 2017-02-15 RX ADMIN — ALBUTEROL SULFATE SCH MG: 2.5 SOLUTION RESPIRATORY (INHALATION) at 13:32

## 2017-02-15 RX ADMIN — INSULIN ASPART SCH UNITS: 100 INJECTION, SOLUTION INTRAVENOUS; SUBCUTANEOUS at 00:13

## 2017-02-15 RX ADMIN — SITAGLIPTIN SCH MG: 50 TABLET, FILM COATED ORAL at 06:34

## 2017-02-15 RX ADMIN — METOCLOPRAMIDE HYDROCHLORIDE SCH MG: 5 SOLUTION ORAL at 12:00

## 2017-02-15 RX ADMIN — ASPIRIN 81 MG SCH MG: 81 TABLET ORAL at 09:03

## 2017-02-15 RX ADMIN — INSULIN ASPART SCH UNITS: 100 INJECTION, SOLUTION INTRAVENOUS; SUBCUTANEOUS at 06:27

## 2017-02-15 RX ADMIN — INSULIN ASPART SCH UNITS: 100 INJECTION, SOLUTION INTRAVENOUS; SUBCUTANEOUS at 12:33

## 2017-02-15 RX ADMIN — METOCLOPRAMIDE HYDROCHLORIDE SCH MG: 5 SOLUTION ORAL at 00:12

## 2017-02-15 RX ADMIN — METOPROLOL TARTRATE SCH MG: 25 TABLET, FILM COATED ORAL at 09:03

## 2017-02-15 RX ADMIN — ALBUTEROL SULFATE SCH MG: 2.5 SOLUTION RESPIRATORY (INHALATION) at 08:38

## 2017-02-15 RX ADMIN — DEXTROSE MONOHYDRATE SCH MLS/HR: 50 INJECTION, SOLUTION INTRAVENOUS at 06:26

## 2017-02-15 NOTE — GENERAL PROGRESS NOTE
Assessment/Plan


Status:  stable


Assessment/Plan


uti ? 


pneumonia vs Atx


dm ooc


htn 


dementia


asp prone








plan:





Antibiotics, change to PO and DC ( Fluconozol and Levaquin)


CXR, no pneumonia


DC agarwal


One dose fluconozol


DC IV Fluids


Pulmonary toilet-


BP and BS control


per orders





Subjective


ROS Limited/Unobtainable:  No


Constitutional:  Reports: malaise


Allergies:  


Coded Allergies:  


     No Known Allergies (Unverified , 3/23/16)





Objective





Last 24 Hour Vital Signs








  Date Time  Temp Pulse Resp B/P Pulse Ox O2 Delivery O2 Flow Rate FiO2


 


2/15/17 08:38  79 16  95 Room Air  


 


2/15/17 08:10 97.0 74 20 131/77 97 Room Air  


 


2/15/17 04:00 97.0 76 18 138/81 97 Room Air  


 


2/15/17 00:00 96.6 88 18 135/68 98 Room Air  


 


2/14/17 20:29  76  123/63    


 


2/14/17 19:57  76 18  97 Room Air  


 


2/14/17 19:56  78 16  94 Room Air  


 


2/14/17 19:00 97.9 77 18 123/63  Room Air  


 


2/14/17 16:00 97.2 74 18 100/52 97 Room Air  


 


2/14/17 12:21  84 18  100 Room Air  


 


2/14/17 12:16  82 18  99 Room Air  


 


2/14/17 12:13 97.9 82 21 109/65 95 Room Air  


 


2/14/17 08:54  99  109/51    

















Intake and Output  


 


 2/14/17 2/15/17





 19:00 07:00


 


Intake Total 912.5 ml 837.5 ml


 


Balance 912.5 ml 837.5 ml


 


  


 


Free Water 230 ml 100 ml


 


IV Total 82.5 ml 137.5 ml


 


Tube Feeding 600 ml 600 ml


 


# Voids  6


 


# Bowel Movements 2 2











Current Medications








 Medications


  (Trade)  Dose


 Ordered  Sig/Shasta


 Route


 PRN Reason  Start Time


 Stop Time Status Last Admin


Dose Admin


 


 Acetaminophen


  (Tylenol)  650 mg  Q6H  PRN


 GT


 Prn Headache/Temp > 101  2/11/17 18:45


 3/13/17 18:44   


 


 


 Albuterol Sulfate


  (Proventil)  2.5 mg  TIDRT


 HHN


   2/13/17 13:00


 2/18/17 12:59  2/15/17 08:38


 


 


 Aspirin 81 mg  81 mg  DAILY


 GT


   2/12/17 09:00


 3/14/17 08:59  2/14/17 08:55


 


 


 Atorvastatin


 Calcium


  (Lipitor)  10 mg  BEDTIME


 GT


   2/11/17 22:00


 3/13/17 21:59  2/14/17 20:29


 


 


 Dextrose


  (Dextrose 50%)    STAT  PRN


 IV


 Hypoglycemia  2/12/17 10:30


 3/14/17 10:29   


 


 


 Insulin Aspart


  (NovoLOG)    Q6HR


 SUBQ


   2/12/17 18:00


 3/14/17 17:59  2/15/17 06:27


 


 


 Metoclopramide HCl


  (Reglan)  5 mg  EVERY 6  HOURS


 GT


   2/12/17 00:00


 3/14/17 00:00  2/15/17 06:25


 


 


 Metoprolol


 Tartrate


  (Lopressor)  12.5 mg  EVERY 12  HOURS


 GT


   2/11/17 21:00


 3/13/17 20:59  2/14/17 20:29


 


 


 Piperacillin Sod/


 Tazobactam Sod/


 Sodium Chloride


  (Zosyn/Sodium


 Chloride)  110 ml @ 


 27.5 mls/hr  Q8HR


 IVPB


   2/11/17 22:00


 2/18/17 21:59  2/15/17 06:26


 


 


 Ranitidine HCl


  (Zantac)  150 mg  TWICE A  DAY


 GT


   2/12/17 09:00


 3/14/17 08:59  2/14/17 18:53


 


 


 Sitagliptin


 Phosphate


  (Januvia)  50 mg  ACBREAKFAST


 GT


   2/12/17 06:30


 3/14/17 06:29  2/15/17 06:34


 





Laboratory Tests


2/15/17 06:50: 


White Blood Count 4.1L, Red Blood Count 3.65L, Hemoglobin 11.4L, Hematocrit 

34.1L, Mean Corpuscular Volume 93, Mean Corpuscular Hemoglobin 31.3H, Mean 

Corpuscular Hemoglobin Concent 33.6, Red Cell Distribution Width 12.3, Platelet 

Count 88L, Mean Platelet Volume 7.5, Neutrophils (%) (Auto) , Lymphocytes (%) (

Auto) , Monocytes (%) (Auto) , Eosinophils (%) (Auto) , Basophils (%) (Auto) , 

Neutrophils % (Manual) [Pending], Lymphocytes % (Manual) [Pending], Platelet 

Estimate [Pending], Platelet Morphology [Pending], Sodium Level 143, Potassium 

Level 3.7, Chloride Level 103, Carbon Dioxide Level 27, Anion Gap 13, Blood 

Urea Nitrogen 10, Creatinine 0.7, Estimat Glomerular Filtration Rate , Glucose 

Level 203H, Calcium Level 9.1, Total Bilirubin 0.7, Aspartate Amino Transf (AST/

SGOT) 11, Alanine Aminotransferase (ALT/SGPT) 12, Alkaline Phosphatase 67, C-

Reactive Protein, Quantitative 1.6H, Total Protein 6.0L, Albumin 2.8L, Globulin 

3.2, Albumin/Globulin Ratio 0.8L


Height (Feet):  5


Height (Inches):  11.00


Weight (Pounds):  17


General Appearance:  no apparent distress


Cardiovascular:  normal rate


Respiratory/Chest:  decreased breath sounds


Objective


other PE not changed











NAY CALDWELL Feb 15, 2017 08:51

## 2017-02-15 NOTE — DISCHARGE INSTRUCTIONS
Discharge Instructions


Discharge Instructions


Follow up with:  myself


Diet:  tube feeding - Glucerna





For Congestive Heart Failure


Reminder


Report to your physician any weight gain of 5 pounds or more in one week.











NAY CALDWELL Feb 15, 2017 08:58

## 2017-02-16 NOTE — DISCHARGE SUMMARY
Discharge Summary


Hospital Course


Date of Admission


Feb 11, 2017 at 15:36


Date of Discharge


Feb 15, 2017 at 14:08


Admitting Diagnosis


pneumonia


HPI


Jolly Shaw is a 87 year old male who was admitted on Feb 11, 2017 at 15:36 for 

Pneumonia


Hospital Course


[id number]





Discharge


Discharge Disposition


Patient was discharged to SNF/Subacute Facility(03)


Discharge Diagnoses:  





Discharge Instructions


Discharge Instructions


Follow up with:  myself











Jojo Harrington NP Feb 16, 2017 18:57

## 2017-02-16 NOTE — DISCHARGE SUMMARY 2 SIG
DATE OF ADMISSION:  02/11/2017



DATE OF DISCHARGE:  02/15/2017



CONSULTANTS:

1. Nash Stokes M.D.

2. Arnoldo Tian M.D.



BRIEF HOSPITAL COURSE:  The patient is an 87-year-old male who is a

[facility] resident, presented with cough and

respiratory distress with hypoxia.  He was seen in the emergency room and

was diagnosed to have urinary tract infection, hypoxia, and possibly

pneumonia.  He was admitted for further management.  Dr. Stokes and Dr. Tian were consulted.  He was given respiratory treatment.  Urinalysis

showed urine WBC too many to count.  He was pancultured and was started on

IV Zosyn.  He came in with sacral stage II pressure ulcer.  Wound care was

rendered.  Influenza A and B were negative.  Blood culture did not isolate

any growth.  Sputum culture showed growth of normal upper respiratory

natividad.  C. difficile was negative.  Urine culture was positive for

Candida.  He was given fluconazole.  Antibiotic was changed to p.o. and

the patient was discharged back to nursing home to complete antibiotic

treatment.



FINAL DIAGNOSES:

1. Possible urinary tract infection.

2. Pneumonia.

3. Diabetes mellitus, out of control.

4. Hypertension.

5. Dementia.

6. Aspiration prone.

7. Feeding dysphagia with feeding via G-tube.

8. Bedridden/functional quadriplegia.

9 Sacral stage II pressure ulcer, present on admission.





  ______________________________________________

  Nish Schilling M.D.



I have been assigned to dictate discharge summary on this account and I

was not involved in the patient's management.



  ______________________________________________

  Jojo Harrington N.P.





DR:  KINGA

D:  02/16/2017 18:57

T:  02/16/2017 22:19

JOB#:  [id number]

CC:



KARRI

## 2017-05-14 ENCOUNTER — HOSPITAL ENCOUNTER (INPATIENT)
Dept: HOSPITAL 72 - EMR | Age: 82
LOS: 3 days | Discharge: NURSING, REHAB OR LONG-TERM CARE | DRG: 871 | End: 2017-05-17
Payer: MEDICARE

## 2017-05-14 VITALS — DIASTOLIC BLOOD PRESSURE: 55 MMHG | SYSTOLIC BLOOD PRESSURE: 114 MMHG

## 2017-05-14 VITALS — DIASTOLIC BLOOD PRESSURE: 56 MMHG | SYSTOLIC BLOOD PRESSURE: 103 MMHG

## 2017-05-14 VITALS — SYSTOLIC BLOOD PRESSURE: 111 MMHG | DIASTOLIC BLOOD PRESSURE: 66 MMHG

## 2017-05-14 VITALS — DIASTOLIC BLOOD PRESSURE: 57 MMHG | SYSTOLIC BLOOD PRESSURE: 112 MMHG

## 2017-05-14 VITALS — SYSTOLIC BLOOD PRESSURE: 110 MMHG | DIASTOLIC BLOOD PRESSURE: 58 MMHG

## 2017-05-14 VITALS — DIASTOLIC BLOOD PRESSURE: 63 MMHG | SYSTOLIC BLOOD PRESSURE: 108 MMHG

## 2017-05-14 VITALS — BODY MASS INDEX: 30.28 KG/M2 | WEIGHT: 188.44 LBS | HEIGHT: 66 IN

## 2017-05-14 VITALS — SYSTOLIC BLOOD PRESSURE: 123 MMHG | DIASTOLIC BLOOD PRESSURE: 69 MMHG

## 2017-05-14 VITALS — SYSTOLIC BLOOD PRESSURE: 105 MMHG | DIASTOLIC BLOOD PRESSURE: 56 MMHG

## 2017-05-14 DIAGNOSIS — Z66: ICD-10-CM

## 2017-05-14 DIAGNOSIS — B96.4: ICD-10-CM

## 2017-05-14 DIAGNOSIS — M19.90: ICD-10-CM

## 2017-05-14 DIAGNOSIS — L89.510: ICD-10-CM

## 2017-05-14 DIAGNOSIS — Z43.1: ICD-10-CM

## 2017-05-14 DIAGNOSIS — L89.150: ICD-10-CM

## 2017-05-14 DIAGNOSIS — A41.9: Primary | ICD-10-CM

## 2017-05-14 DIAGNOSIS — J18.9: ICD-10-CM

## 2017-05-14 DIAGNOSIS — I10: ICD-10-CM

## 2017-05-14 DIAGNOSIS — L89.890: ICD-10-CM

## 2017-05-14 DIAGNOSIS — N39.0: ICD-10-CM

## 2017-05-14 DIAGNOSIS — F03.90: ICD-10-CM

## 2017-05-14 DIAGNOSIS — E11.9: ICD-10-CM

## 2017-05-14 DIAGNOSIS — A49.01: ICD-10-CM

## 2017-05-14 LAB
ALBUMIN/GLOB SERPL: 0.8 {RATIO} (ref 1–2.7)
ALT SERPL-CCNC: 20 U/L (ref 3–41)
ANION GAP SERPL CALC-SCNC: 12 MMOL/L (ref 5–15)
APPEARANCE UR: (no result)
APTT BLD: 28 SEC (ref 23–33)
AST SERPL-CCNC: 15 U/L (ref 5–40)
BACTERIA #/AREA URNS HPF: (no result) /HPF
CALCIUM SERPL-MCNC: 9.2 MG/DL (ref 8.6–10.2)
CHLORIDE SERPL-SCNC: 98 MEQ/L (ref 98–107)
CK MB SERPL-MCNC: < 1.5 NG/ML (ref ?–6.7)
CO2 SERPL-SCNC: 27 MEQ/L (ref 20–30)
CREAT SERPL-MCNC: 0.8 MG/DL (ref 0.7–1.2)
ERYTHROCYTE [DISTWIDTH] IN BLOOD BY AUTOMATED COUNT: 11.9 % (ref 11.6–14.8)
GLOBULIN SER-MCNC: 3.7 G/DL
HEMOLYSIS: 12
INR PPP: 1 (ref 0.9–1.1)
KETONES UR QL STRIP: NEGATIVE
LEUKOCYTE ESTERASE UR QL STRIP: (no result)
MCH RBC QN AUTO: 32.4 PG (ref 27–31)
MCHC RBC AUTO-ENTMCNC: 35 G/DL (ref 32–36)
MCV RBC AUTO: 93 FL (ref 80–99)
NITRITE UR QL STRIP: NEGATIVE
PH UR STRIP: 6.5 [PH] (ref 4.5–8)
PLATELET # BLD: 97 K/UL (ref 150–450)
PMV BLD AUTO: 8.5 FL (ref 6.5–10.1)
POTASSIUM SERPL-SCNC: 4.6 MEQ/L (ref 3.4–4.9)
PROT SERPL-MCNC: 6.7 G/DL (ref 6.6–8.7)
PROT UR QL STRIP: (no result)
PROTHROMBIN TIME: 10.3 SEC (ref 9.3–11.5)
RBC # BLD AUTO: 4.16 M/UL (ref 4.7–6.1)
RBC #/AREA URNS HPF: (no result) /HPF (ref 0–0)
SODIUM SERPL-SCNC: 137 MEQ/L (ref 135–145)
SP GR UR STRIP: 1.01 (ref 1–1.03)
SQUAMOUS #/AREA URNS LPF: (no result) /LPF
TROPONIN I SERPL-MCNC: < 0.3 NG/ML (ref ?–0.3)
UROBILINOGEN UR-MCNC: NORMAL MG/DL (ref 0–1)
WBC # BLD AUTO: 11.2 K/UL (ref 4.8–10.8)
WBC #/AREA URNS HPF: (no result) /HPF (ref 0–0)

## 2017-05-14 PROCEDURE — 81001 URINALYSIS AUTO W/SCOPE: CPT

## 2017-05-14 PROCEDURE — 94664 DEMO&/EVAL PT USE INHALER: CPT

## 2017-05-14 PROCEDURE — 82553 CREATINE MB FRACTION: CPT

## 2017-05-14 PROCEDURE — 84443 ASSAY THYROID STIM HORMONE: CPT

## 2017-05-14 PROCEDURE — 82977 ASSAY OF GGT: CPT

## 2017-05-14 PROCEDURE — 84484 ASSAY OF TROPONIN QUANT: CPT

## 2017-05-14 PROCEDURE — 93005 ELECTROCARDIOGRAM TRACING: CPT

## 2017-05-14 PROCEDURE — 94640 AIRWAY INHALATION TREATMENT: CPT

## 2017-05-14 PROCEDURE — 81003 URINALYSIS AUTO W/O SCOPE: CPT

## 2017-05-14 PROCEDURE — 87040 BLOOD CULTURE FOR BACTERIA: CPT

## 2017-05-14 PROCEDURE — 83880 ASSAY OF NATRIURETIC PEPTIDE: CPT

## 2017-05-14 PROCEDURE — 87086 URINE CULTURE/COLONY COUNT: CPT

## 2017-05-14 PROCEDURE — 83735 ASSAY OF MAGNESIUM: CPT

## 2017-05-14 PROCEDURE — 85730 THROMBOPLASTIN TIME PARTIAL: CPT

## 2017-05-14 PROCEDURE — 85025 COMPLETE CBC W/AUTO DIFF WBC: CPT

## 2017-05-14 PROCEDURE — 82962 GLUCOSE BLOOD TEST: CPT

## 2017-05-14 PROCEDURE — 87181 SC STD AGAR DILUTION PER AGT: CPT

## 2017-05-14 PROCEDURE — 80053 COMPREHEN METABOLIC PANEL: CPT

## 2017-05-14 PROCEDURE — 87205 SMEAR GRAM STAIN: CPT

## 2017-05-14 PROCEDURE — 84550 ASSAY OF BLOOD/URIC ACID: CPT

## 2017-05-14 PROCEDURE — 86140 C-REACTIVE PROTEIN: CPT

## 2017-05-14 PROCEDURE — 71010: CPT

## 2017-05-14 PROCEDURE — 87081 CULTURE SCREEN ONLY: CPT

## 2017-05-14 PROCEDURE — 85610 PROTHROMBIN TIME: CPT

## 2017-05-14 PROCEDURE — 87070 CULTURE OTHR SPECIMN AEROBIC: CPT

## 2017-05-14 PROCEDURE — 94760 N-INVAS EAR/PLS OXIMETRY 1: CPT

## 2017-05-14 PROCEDURE — 85007 BL SMEAR W/DIFF WBC COUNT: CPT

## 2017-05-14 PROCEDURE — 82550 ASSAY OF CK (CPK): CPT

## 2017-05-14 PROCEDURE — 84100 ASSAY OF PHOSPHORUS: CPT

## 2017-05-14 PROCEDURE — 83605 ASSAY OF LACTIC ACID: CPT

## 2017-05-14 PROCEDURE — 83036 HEMOGLOBIN GLYCOSYLATED A1C: CPT

## 2017-05-14 PROCEDURE — 80061 LIPID PANEL: CPT

## 2017-05-14 PROCEDURE — 36415 COLL VENOUS BLD VENIPUNCTURE: CPT

## 2017-05-14 RX ADMIN — DEXTROSE MONOHYDRATE SCH MLS/HR: 50 INJECTION, SOLUTION INTRAVENOUS at 14:28

## 2017-05-14 RX ADMIN — ALBUTEROL SULFATE SCH MG: 2.5 SOLUTION RESPIRATORY (INHALATION) at 09:00

## 2017-05-14 RX ADMIN — ALBUTEROL SULFATE SCH MG: 2.5 SOLUTION RESPIRATORY (INHALATION) at 15:51

## 2017-05-14 RX ADMIN — DEXTROSE MONOHYDRATE SCH MLS/HR: 50 INJECTION, SOLUTION INTRAVENOUS at 06:13

## 2017-05-14 RX ADMIN — DEXTROSE MONOHYDRATE SCH MLS/HR: 50 INJECTION, SOLUTION INTRAVENOUS at 21:33

## 2017-05-14 NOTE — HISTORY & PHYSICAL
History and Physical


History & Physicial


87 y old


fever chest congestion


UTI 


? Aspiration





others:





DM


HTN


Dementia


Asp prone


PEG


DNR DNI





Plan;





Josias


Pulm toilet


2 D echo


Pul eval





# [id number]











NAY CALDWELL May 14, 2017 11:27

## 2017-05-14 NOTE — CONSULTATION
History of Present Illness


General


Date patient seen:  May 14, 2017


Chief Complaint:  Fever


Referring physician:  Dr. Schilling


Reason for Consultation:  inpatient management





Present Illness


HPI


87-year-old male with a PMHx of  CVA, Gube, diabetes, hypertension, renal 

insufficiency,  bedbound,  nursing home resident brought in with CC  of fever 

and cough,  up to 101.   Coughing is nonproductive in nature.  He felt short of 

breath.  Also hypoxic per nursing home note.  History is limited.


Allergies:  


Coded Allergies:  


     No Known Allergies (Unverified , 3/23/16)





Medication History


Scheduled


Albuterol Sulfate* (Albuterol Sulfate Hhn*), 2.5 MG HHN TIDRT


Ascorbic Acid* (Vitamin C*), 500 MG ORAL DAILY, (Reported)


Aspirin* (Aspirin*), 81 MG ORAL DAILY, (Reported)


Aspirin* (Aspir 81*), 81 MG GT DAILY, (Reported)


Atorvastatin Calcium* (Atorvastatin Calcium*), 10 MG ORAL BEDTIME, (Reported)


Atorvastatin Calcium* (Atorvastatin Calcium*), 10 MG GT BEDTIME, (Reported)


Docusate Sodium (Docusate Sodium), 100 MG ORAL DAILY, (Reported)


Levofloxacin* (Levaquin*), 500 MG ORAL DAILY


Metoclopramide HCl (Metoclopramide HCl), 5 MG GT EVERY 6 HOURS


Metoprolol Tartrate (Metoprolol Tartrate), 12.5 MG ORAL Q12HR


Metoprolol Tartrate* (Metoprolol Tartrate*), 12.5 MG GT EVERY 12 HOURS, (

Reported)


Multivitamin With Minerals (Multivitamins With Minerals*), 1 TAB ORAL DAILY, (

Reported)


Ranitidine Hcl* (Zantac*), 150 MG ORAL TWICE A DAY


Ranitidine Hcl* (Zantac*), 150 MG GT TWICE A DAY, (Reported)


Repaglinide (Prandin), 2 MG ORAL TIAC


Repaglinide (Prandin), 2 MG GT THREE TIMES A DAY, (Reported)


Sennosides (Senna), 8.6 MG PO BEDTIME, (Reported)


Sennosides (Senna), 8.6 MG GT QHS, (Reported)


Sitagliptin (Januvia), 50 MG GT ACBREAKFAST


Sitagliptin (Januvia), 100 MG GT DAILY, (Reported)


Sitagliptin* (Januvia*), 100 MG ORAL DAILY, (Reported)


Tamsulosin HCl (Flomax), 0.4 MG ORAL BEDTIME


Tamsulosin Hcl (Tamsulosin Hcl*), 0.4 MG GT BEDTIME, (Reported)


Zinc Sulfate (Zinc Sulfate*), 220 MG GT DAILY, (Reported)





Scheduled PRN


Acetaminophen (Acetaminophen), 650 MG GT Q6H PRN for Prn Headache/Temp > 101, (

Reported)


Acetaminophen* (Acetaminophen*), 650 MG ORAL Q6H PRN for Mild Pain/Temp > 100.5,

 (Reported)





Miscellaneous Medications


Multivit-Min/Iron Fum/Folic AC (Multi-Vitamin-Minerals Tablet), 1 EACH GT, (

Reported)





Discontinued Medications


Sennosides (Senna), 8.8 MG GT, (Reported)


   Discontinued Reason: Prescription changed





Patient History


Healthcare decision maker


POA: Megan Arreaga


Resuscitation status


Do Not Resuscitate


Advanced Directive on File


No





Past Medical/Surgical History


Past Medical/Surgical History:  


(1) Feeding by G-tube


(2) Diabetes mellitus


(3) Dementia





Review of Systems


All Other Systems:  negative except mentioned in HPI





Physical Exam


General Appearance:  WD/WN


Lines, tubes and drains:  peripheral, gtube


HEENT:  normocephalic, atraumatic


Neck:  non-tender, normal alignment


Respiratory/Chest:  chest wall non-tender, lungs clear


Breasts:  no masses


Cardiovascular/Chest:  normal peripheral pulses


Abdomen:  normal bowel sounds


Genitourinary/Rectal:  normal genital exam


Extremities:  normal range of motion


Skin Exam:  normal pigmentation


Neurologic:  CNs II-XII grossly normal





Last 24 Hour Vital Signs








  Date Time  Temp Pulse Resp B/P Pulse Ox O2 Delivery O2 Flow Rate FiO2


 


5/14/17 16:02  76 14  96 Nasal Cannula 2.0 28


 


5/14/17 15:51  82 14   Nasal Cannula 2.0 28


 


5/14/17 12:00 97.5 75 18 111/66  Nasal Cannula 2.0 98


 


5/14/17 11:45  90 14  97 Nasal Cannula 2.0 28


 


5/14/17 11:34  89 14   Nasal Cannula 2.0 28


 


5/14/17 08:00  78      


 


5/14/17 08:00 96.4 77 18 110/58  Nasal Cannula 2.0 97


 


5/14/17 07:36  86 15   Nasal Cannula 3.0 32


 


5/14/17 04:00 97.5 78 18 105/56 98 Nasal Cannula 2.0 


 


5/14/17 03:45  87      


 


5/14/17 03:30 96.6 80 20 112/57 97 Nasal Cannula 2.0 


 


5/14/17 02:50 98.0 85 20 114/55 100 Nasal Cannula 2.0 


 


5/14/17 02:09 98.0 85 20 114/55 100 Nasal Cannula 2.0 


 


5/14/17 00:44 97.7 87 16 108/63 96 Nasal Cannula 2.0 

















Intake and Output  


 


 5/13/17 5/14/17





 19:00 07:00


 


Intake Total  55 ml


 


Output Total  500 ml


 


Balance  -445 ml


 


  


 


Intake IV Total  55 ml


 


Output Urine Total  500 ml


 


# Bowel Movements  1











Laboratory Tests








Test


  5/14/17


00:45 5/14/17


01:25


 


White Blood Count


  11.2 K/UL


(4.8-10.8)  H 


 


 


Red Blood Count


  4.16 M/UL


(4.70-6.10)  L 


 


 


Hemoglobin


  13.5 G/DL


(14.2-18.0)  L 


 


 


Hematocrit


  38.5 %


(42.0-52.0)  L 


 


 


Mean Corpuscular Volume 93 FL (80-99)   


 


Mean Corpuscular Hemoglobin


  32.4 PG


(27.0-31.0)  H 


 


 


Mean Corpuscular Hemoglobin


Concent 35.0 G/DL


(32.0-36.0) 


 


 


Red Cell Distribution Width


  11.9 %


(11.6-14.8) 


 


 


Platelet Count


  97 K/UL


(150-450)  L 


 


 


Mean Platelet Volume


  8.5 FL


(6.5-10.1) 


 


 


Neutrophils (%) (Auto)


  % (45.0-75.0)


  


 


 


Lymphocytes (%) (Auto)


  % (20.0-45.0)


  


 


 


Monocytes (%) (Auto)  % (1.0-10.0)   


 


Eosinophils (%) (Auto)  % (0.0-3.0)   


 


Basophils (%) (Auto)  % (0.0-2.0)   


 


Prothrombin Time


  10.3 SEC


(9.30-11.50) 


 


 


Prothromb Time International


Ratio 1.0 (0.9-1.1)  


  


 


 


Activated Partial


Thromboplast Time 28 SEC (23-33)


  


 


 


Sodium Level


  137 mEQ/L


(135-145) 


 


 


Potassium Level


  4.6 mEQ/L


(3.4-4.9) 


 


 


Chloride Level


  98 mEQ/L


() 


 


 


Carbon Dioxide Level


  27 mEQ/L


(20-30) 


 


 


Anion Gap 12 (5-15)   


 


Blood Urea Nitrogen


  24 mg/dL


(7-23)  H 


 


 


Creatinine


  0.8 mg/dL


(0.7-1.2) 


 


 


Estimat Glomerular Filtration


Rate  mL/min (>60)  


  


 


 


Glucose Level


  183 mg/dL


()  H 


 


 


Lactic Acid Level


  1.40 mmol/L


(0.66-2.22) 


 


 


Calcium Level


  9.2 mg/dL


(8.6-10.2) 


 


 


Total Bilirubin


  0.9 mg/dL


(0.0-1.2) 


 


 


Aspartate Amino Transf


(AST/SGOT) 15 U/L (5-40)  


  


 


 


Alanine Aminotransferase


(ALT/SGPT) 20 U/L (3-41)  


  


 


 


Alkaline Phosphatase


  80 U/L


() 


 


 


Total Creatine Kinase


  29 U/L


()  L 


 


 


Creatine Kinase MB


  < 1.5 ng/mL (<


6.7) 


 


 


Creatine Kinase MB Relative


Index 5.1  


  


 


 


Troponin I


  < 0.30 ng/mL


(<=0.30) 


 


 


Total Protein


  6.7 g/dL


(6.6-8.7) 


 


 


Albumin


  3.0 g/dL


(3.5-5.2)  L 


 


 


Globulin 3.7 g/dL   


 


Albumin/Globulin Ratio


  0.8 (1.0-2.7)


L 


 


 


Urine Color  Pale yellow  


 


Urine Appearance


  


  Slightly


cloudy


 


Urine pH  6.5 (4.5-8.0)  


 


Urine Specific Gravity


  


  1.010


(1.005-1.035)


 


Urine Protein


  


  1+ (NEGATIVE)


H


 


Urine Glucose (UA)


  


  Negative


(NEGATIVE)


 


Urine Ketones


  


  Negative


(NEGATIVE)


 


Urine Occult Blood


  


  2+ (NEGATIVE)


H


 


Urine Nitrite


  


  Negative


(NEGATIVE)


 


Urine Bilirubin


  


  Negative


(NEGATIVE)


 


Urine Urobilinogen


  


  Normal MG/DL


(0.0-1.0)


 


Urine Leukocyte Esterase


  


  3+ (NEGATIVE)


H


 


Urine RBC


  


  5-10 /HPF (0 -


0)  H


 


Urine WBC


  


  Tntc /HPF (0 -


0)  H


 


Urine Squamous Epithelial


Cells 


  Few /LPF


(NONE/OCC)


 


Urine Bacteria


  


  Moderate /HPF


(NONE)  H








Height (Feet):  5


Height (Inches):  6.00


Weight (Pounds):  174


Medications





Current Medications








 Medications


  (Trade)  Dose


 Ordered  Sig/Shasta


 Route


 PRN Reason  Start Time


 Stop Time Status Last Admin


Dose Admin


 


 Acetaminophen


  (Tylenol)  650 mg  Q4H  PRN


 GT


 Mild Pain/Temp > 100.5  5/14/17 04:30


 6/13/17 04:29   


 


 


 Albuterol Sulfate


  (Proventil)  2.5 mg  QID


 HHN


   5/14/17 09:00


 5/19/17 08:59  5/14/17 15:51


 


 


 Aspirin


  (ASA)  81 mg  DAILY


 GT


   5/15/17 09:00


 6/14/17 08:59   


 


 


 Atorvastatin


 Calcium


  (Lipitor)  10 mg  BEDTIME


 GT


   5/14/17 21:00


 6/13/17 20:59   


 


 


 Dextrose


  (Dextrose 50%)    STAT  PRN


 IV


 Hypoglycemia  5/14/17 13:45


 6/13/17 13:44   


 


 


 Dextrose     STAT  PRN


 IV


 Hypoglycemia  5/14/17 04:30


 6/13/17 04:29   


 


 


 Dextrose/Sodium


 Chloride


  (D5ns)  1,000 ml @ 


 50 mls/hr  Q20H


 IV


   5/14/17 05:27


 6/13/17 05:26  5/14/17 06:13


 


 


 Docusate Sodium


  (Colace)  100 mg  BID


 GT


   5/14/17 18:00


 6/13/17 17:59   


 


 


 Insulin Aspart


  (NovoLOG)    BEFORE MEALS AND  HS


 SUBQ


   5/14/17 16:30


 6/13/17 16:29   


 


 


 Metoprolol


 Tartrate


  (Lopressor)  12.5 mg  Q12HR


 GT


   5/14/17 21:00


 6/13/17 20:59   


 


 


 Ondansetron HCl


  (Zofran)  4 mg  Q8H  PRN


 IVP


 Nausea & Vomiting  5/14/17 04:30


 6/13/17 04:29   


 


 


 Piperacillin Sod/


 Tazobactam Sod/


 Dextrose


  (Zosyn/D5W)  110 ml @ 


 27.5 mls/hr  EVERY 8  HOURS


 IVPB


   5/14/17 06:00


 5/19/17 05:59  5/14/17 14:28


 


 


 Ranitidine HCl


  (Zantac)  150 mg  TWICE A  DAY


 GT


   5/14/17 18:00


 6/13/17 17:59   


 


 


 Repaglinide


  (Prandin)  2 mg  Q8HR


 ORAL


   5/14/17 14:00


 6/13/17 13:59  5/14/17 14:39


 


 


 Tamsulosin HCl


  (Flomax)  0.4 mg  BEDTIME


 ORAL


   5/14/17 21:00


 6/13/17 20:59   


 











Assessment/Plan


Problem List:  


(1) Sepsis


ICD Codes:  A41.9 - Sepsis, unspecified organism


SNOMED:  84158171


Qualifiers:  


   Qualified Codes:  A41.9 - Sepsis, unspecified organism


(2) Diabetes mellitus


ICD Codes:  E11.9 - Type 2 diabetes mellitus without complications


SNOMED:  24862841


(3) Pneumonia


ICD Codes:  J18.9 - Pneumonia, unspecified organism


SNOMED:  224837458


Qualifiers:  


   Qualified Codes:  J18.9 - Pneumonia, unspecified organism


(4) Feeding by G-tube


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


SNOMED:  874275785, [id number]


(5) Dementia


ICD Codes:  F03.90 - Unspecified dementia without behavioral disturbance


SNOMED:  19256308


Assessment/Plan


pan culture


IV antibiotics


respiratory treatment


sputum for c/s


gtube feeding


nutrition evaluation


med/surg


dnr











JOAN DEMARCO May 14, 2017 16:31

## 2017-05-14 NOTE — HISTORY AND PHYSICAL REPORT
DATE OF ADMISSION:  05/14/2017



HISTORY OF PRESENT ILLNESS:  The patient is 87-year-old and a

resident of [facility].  The patient was

transferred to emergency room for chest congestion and fever, not

responding to initial breathing treatment at the nursing home.  Also, the

patient was found to be coughing and _____ hypoxic in the nursing home.

After initial evaluation in the emergency room, the patient is being

admitted with a possible diagnosis of sepsis from urinary tract infection

and/or aspiration pneumonia.



PAST MEDICAL HISTORY:  Past history of the patient is significant for

dementia, previous percutaneous endoscopic gastrostomy insertion, anemia,

history of urinary tract infection, history of diabetes mellitus,

osteoarthritis, hypertension, and previous dehydration.



REVIEW OF SYSTEMS:  Cannot be obtained because the patient is not

verbal at this time.



PHYSICAL EXAMINATION:

VITAL SIGNS:  Temperature 96.4 degrees, at this time pulse rate 77,

blood pressure 110/58, and respiratory rate is 18.

GENERAL:  The patient is lethargic and not in any distress.

NECK:  Rigid to all directions.

LUNGS:  Rare wheeze.  Poor inspiratory effort.

HEART:  Regular with occasional irregular beats.  Normal rate.

ABDOMEN:  Soft.  GT-tube in place.

EXTREMITIES:  Lower extremities, no edema.

CENTRAL NERVOUS SYSTEM:  The patient opens eyes and moves

extremities.



LABORATORY RESULTS:  Chem panel, BUN 24, glucose 183, and albumin 3.

CBC, white blood cells 11.2 and hemoglobin 13.5.  Urinalysis, too numerous

to count white blood cells and 3+ leukocyte esterase.



IMPRESSION:  This 87-year-old comes in with fever and chest

congestion.  The chest x-ray reports central pulmonary vascular

congestion.  No definite pneumonia.  The patient has evidence of urinary

tract infection with the possibility of aspiration.



PLAN:  At this point, the patient is started on Zosyn pending the

culture and breathing treatment.  We will monitor the vital signs and

blood sugar and we will avoid nephrotoxics.  We will hydrate and obtain a

2D echocardiogram and pulmonary consult.  According to how the patient's

condition evolves, we will make the proper changes in our future

management.









  ______________________________________________

  Nish Schilling M.D.





DR:  RADHA

D:  05/14/2017 11:35

T:  05/14/2017 17:53

JOB#:  [id number]

CC:

## 2017-05-14 NOTE — EMERGENCY ROOM REPORT
History of Present Illness


General


Chief Complaint:  Fever


Source:  Medical Record, EMS





Present Illness


HPI


This is an 87-year-old male with a past medical history diabetes, hypertension, 

renal insufficiency and his bedbound.  He resided in [facility].  He presents with chief complaint of fever and cough.  Onset 

today.  Fever up to 101.  No relief with Tylenol.  Coughing is nonproductive in 

nature.  He felt short of breath.  Also hypoxic per nursing home note.  History 

is limited in this patient because of his condition.  Patient is a DO NOT 

RESUSCITATE.


Allergies:  


Coded Allergies:  


     No Known Allergies (Unverified , 3/23/16)





Patient History


Past Medical History:  see triage record, old chart reviewed


Past Surgical History:  other


Pertinent Family History:  none


Social History:  Denies: smoking


Immunizations:  other


Reviewed Nursing Documentation:  PMH: Agreed, PSxH: Agreed





Nursing Documentation-PMH


Hx Hypertension:  Yes


Hx Diabetes:  Yes - Type 2


Hx Cancer:  No


Hx Dementia:  Yes


Hx Dysphasia:  Yes





Review of Systems


Constitutional:  Reports: malaise, weakness


Eye:  Denies: blurred vision, eye pain


ENT:  Denies: ear pain, nose congestion, throat swelling


Respiratory:  Reports: cough, shortness of breath


Cardiovascular:  Denies: chest pain, palpitations


Gastrointestinal:  Denies: abdominal pain, diarrhea, nausea, vomiting


Musculoskeletal:  Denies: back pain, joint pain


Skin:  Denies: rash


Neurological:  Denies: headache, numbness


Endocrine:  Denies: increased thirst, increased urine


Hematologic/Lymphatic:  Denies: easy bruising


All Other Systems:  negative except mentioned in HPI





Physical Exam





Vital Signs








  Date Time  Temp Pulse Resp B/P Pulse Ox O2 Delivery O2 Flow Rate FiO2


 


5/14/17 00:44 97.7 87 16 108/63 96 Room Air  





vitals with hypoxia.  Please note oxygenation is on 3 L.


Sp02 EP Interpretation:  reviewed, normal


General Appearance:  alert, moderate distress, Chronically Ill


Head:  normocephalic, atraumatic


Eyes:  bilateral eye EOMI, bilateral eye PERRL


ENT:  hearing grossly normal, normal pharynx


Neck:  full range of motion, supple, no meningismus


Respiratory:  chest non-tender, lungs clear, normal breath sounds


Cardiovascular #1:  regular rate, rhythm, no murmur


Gastrointestinal:  normal bowel sounds, non tender, no mass, no organomegaly, 

no bruit, non-distended


Musculoskeletal:  non-tender


Psychiatric:  mood/affect normal


Skin:  warm/dry





Procedures


Critical Care Time


Critical Care Time


Critical care is mandated in this patient who presented with sepsis from UTI 

and pneumonia.  Patient require my urgent intervention to attenuate the risks 

of metabolic collapse which may lead to cardiovascular collapse and death.  

Critical care time is 35 minutes excluding any reportable procedure.  Critical 

care time included evaluation, multiple reevaluation, looking at old charts, 

interpreting laboratory and diagnostic data, discussing case with patient and 

family and consultants, and charting.





Medical Decision Making


Diagnostic Impression:  


 Primary Impression:  


 Sepsis


 Qualified Codes:  A41.9 - Sepsis, unspecified organism


 Additional Impressions:  


 UTI (urinary tract infection)


 Qualified Codes:  N30.00 - Acute cystitis without hematuria


 Pneumonia


 Qualified Codes:  J18.9 - Pneumonia, unspecified organism


ER Course


Patient presents with sepsis secondary to pneumonia and UTI.  Lactic acid is 

normal.  White count normal.  He is improving with increasing mental status.  

Able to converse more.  More alert.  He received wide spectrum antibiotics and 

IV fluid.  I discussed the case with Dr. Schilling who will admit.





Laboratory Tests








Test


  5/14/17


00:45 5/14/17


01:25


 


White Blood Count


  11.2 K/UL


(4.8-10.8)  H 


 


 


Red Blood Count


  4.16 M/UL


(4.70-6.10)  L 


 


 


Hemoglobin


  13.5 G/DL


(14.2-18.0)  L 


 


 


Hematocrit


  38.5 %


(42.0-52.0)  L 


 


 


Mean Corpuscular Volume 93 FL (80-99)   


 


Mean Corpuscular Hemoglobin


  32.4 PG


(27.0-31.0)  H 


 


 


Mean Corpuscular Hemoglobin


Concent 35.0 G/DL


(32.0-36.0) 


 


 


Red Cell Distribution Width


  11.9 %


(11.6-14.8) 


 


 


Platelet Count


  97 K/UL


(150-450)  L 


 


 


Mean Platelet Volume


  8.5 FL


(6.5-10.1) 


 


 


Neutrophils (%) (Auto)


  % (45.0-75.0)


  


 


 


Lymphocytes (%) (Auto)


  % (20.0-45.0)


  


 


 


Monocytes (%) (Auto)  % (1.0-10.0)   


 


Eosinophils (%) (Auto)  % (0.0-3.0)   


 


Basophils (%) (Auto)  % (0.0-2.0)   


 


Prothrombin Time


  10.3 SEC


(9.30-11.50) 


 


 


Prothromb Time International


Ratio 1.0 (0.9-1.1)  


  


 


 


Activated Partial


Thromboplast Time 28 SEC (23-33)


  


 


 


Sodium Level


  137 mEQ/L


(135-145) 


 


 


Potassium Level


  4.6 mEQ/L


(3.4-4.9) 


 


 


Chloride Level


  98 mEQ/L


() 


 


 


Carbon Dioxide Level


  27 mEQ/L


(20-30) 


 


 


Anion Gap 12 (5-15)   


 


Blood Urea Nitrogen


  24 mg/dL


(7-23)  H 


 


 


Creatinine


  0.8 mg/dL


(0.7-1.2) 


 


 


Estimat Glomerular Filtration


Rate  mL/min (>60)  


  


 


 


Glucose Level


  183 mg/dL


()  H 


 


 


Lactic Acid Level


  1.40 mmol/L


(0.66-2.22) 


 


 


Calcium Level


  9.2 mg/dL


(8.6-10.2) 


 


 


Total Bilirubin


  0.9 mg/dL


(0.0-1.2) 


 


 


Aspartate Amino Transf


(AST/SGOT) 15 U/L (5-40)  


  


 


 


Alanine Aminotransferase


(ALT/SGPT) 20 U/L (3-41)  


  


 


 


Alkaline Phosphatase


  80 U/L


() 


 


 


Total Creatine Kinase


  29 U/L


()  L 


 


 


Creatine Kinase MB


  < 1.5 ng/mL (<


6.7) 


 


 


Creatine Kinase MB Relative


Index 5.1  


  


 


 


Troponin I


  < 0.30 ng/mL


(<=0.30) 


 


 


Total Protein


  6.7 g/dL


(6.6-8.7) 


 


 


Albumin


  3.0 g/dL


(3.5-5.2)  L 


 


 


Globulin 3.7 g/dL   


 


Albumin/Globulin Ratio


  0.8 (1.0-2.7)


L 


 


 


Urine Color  Pale yellow  


 


Urine Appearance


  


  Slightly


cloudy


 


Urine pH  6.5 (4.5-8.0)  


 


Urine Specific Gravity


  


  1.010


(1.005-1.035)


 


Urine Protein


  


  1+ (NEGATIVE)


H


 


Urine Glucose (UA)


  


  Negative


(NEGATIVE)


 


Urine Ketones


  


  Negative


(NEGATIVE)


 


Urine Occult Blood


  


  2+ (NEGATIVE)


H


 


Urine Nitrite


  


  Negative


(NEGATIVE)


 


Urine Bilirubin


  


  Negative


(NEGATIVE)


 


Urine Urobilinogen


  


  Normal MG/DL


(0.0-1.0)


 


Urine Leukocyte Esterase


  


  3+ (NEGATIVE)


H


 


Urine RBC


  


  5-10 /HPF (0 -


0)  H


 


Urine WBC


  


  Tntc /HPF (0 -


0)  H


 


Urine Squamous Epithelial


Cells 


  Few /LPF


(NONE/OCC)


 


Urine Bacteria


  


  Moderate /HPF


(NONE)  H








Lab Results Impression


labs unremarkable


EKG Diagnostic Results


Rate:  normal


Rhythm:  NSR


ST Segments:  no acute changes





Rhythm Strip Diag. Results


EP Interpretation:  yes


Rate:  85


Rhythm:  NSR, no PVC's, no ectopy





Chest X-Ray Diagnostic Results


EP Interpretation:  Yes


Findings:  no effusion, no pneumothorax, other - Increased interstitial markings


Number of Views:  1





Last Vital Signs








  Date Time  Temp Pulse Resp B/P Pulse Ox O2 Delivery O2 Flow Rate FiO2


 


5/14/17 00:44 97.7 87 16 108/63 96 Room Air  








Status:  improved


Disposition:  ADMITTED AS INPATIENT


Condition:  Serious











JOSÉ MIGUEL HERNANDEZ M.D. May 14, 2017 01:03

## 2017-05-14 NOTE — DIAGNOSTIC IMAGING REPORT
Indication: Shortness of breath



Technique: XRAY CHEST 1 V



Comparison: 2/14/17



Findings: Cardiomediastinal silhouette is stable. There is central pulmonary

vascular congestion. Atherosclerotic changes are present. Osseous structures are

grossly stable.



Impression:



Central pulmonary vascular congestion. Clinical correlation/followup recommended.

## 2017-05-15 VITALS — DIASTOLIC BLOOD PRESSURE: 59 MMHG | SYSTOLIC BLOOD PRESSURE: 118 MMHG

## 2017-05-15 VITALS — SYSTOLIC BLOOD PRESSURE: 111 MMHG | DIASTOLIC BLOOD PRESSURE: 61 MMHG

## 2017-05-15 VITALS — DIASTOLIC BLOOD PRESSURE: 56 MMHG | SYSTOLIC BLOOD PRESSURE: 104 MMHG

## 2017-05-15 VITALS — SYSTOLIC BLOOD PRESSURE: 98 MMHG | DIASTOLIC BLOOD PRESSURE: 55 MMHG

## 2017-05-15 VITALS — SYSTOLIC BLOOD PRESSURE: 106 MMHG | DIASTOLIC BLOOD PRESSURE: 57 MMHG

## 2017-05-15 LAB
ALBUMIN/GLOB SERPL: 0.9 {RATIO} (ref 1–2.7)
ALT SERPL-CCNC: 14 U/L (ref 3–41)
ANION GAP SERPL CALC-SCNC: 11 MMOL/L (ref 5–15)
AST SERPL-CCNC: 12 U/L (ref 5–40)
BASOPHILS NFR BLD MANUAL: 0 % (ref 0–2)
CALCIUM SERPL-MCNC: 9.2 MG/DL (ref 8.6–10.2)
CHLORIDE SERPL-SCNC: 103 MEQ/L (ref 98–107)
CHOLEST SERPL-MCNC: 68 MG/DL (ref ?–200)
CHOLEST/HDLC SERPL: 2.3 {RATIO} (ref 3.3–4.4)
CO2 SERPL-SCNC: 28 MEQ/L (ref 20–30)
CREAT SERPL-MCNC: 0.8 MG/DL (ref 0.7–1.2)
CRP SERPL-MCNC: 4.2 MG/DL (ref ?–0.5)
EOSINOPHIL NFR BLD MANUAL: 6 % (ref 0–3)
ERYTHROCYTE [DISTWIDTH] IN BLOOD BY AUTOMATED COUNT: 12.1 % (ref 11.6–14.8)
GLOBULIN SER-MCNC: 3.1 G/DL
HBA1C MFR BLD: 6.4 % (ref ?–6)
HEMOLYSIS: 3
LDLC SERPL ELPH-MCNC: 28 MG/DL (ref 60–99)
MAGNESIUM SERPL-MCNC: 1.8 MG/DL (ref 1.7–2.5)
MCH RBC QN AUTO: 33.1 PG (ref 27–31)
MCHC RBC AUTO-ENTMCNC: 35.5 G/DL (ref 32–36)
MCV RBC AUTO: 93 FL (ref 80–99)
NEUTS BAND NFR BLD MANUAL: 0 % (ref 0–8)
NEUTS BAND NFR BLD MANUAL: 60 % (ref 45–75)
PHOSPHATE SERPL-MCNC: 2.8 MG/DL (ref 2.5–4.8)
PLAT MORPH BLD: NORMAL
PLATELET # BLD EST: (no result) 10*3/UL
PLATELET # BLD: 81 K/UL (ref 150–450)
PMV BLD AUTO: 8.6 FL (ref 6.5–10.1)
POTASSIUM SERPL-SCNC: 4.3 MEQ/L (ref 3.4–4.9)
PROT SERPL-MCNC: 5.9 G/DL (ref 6.6–8.7)
RBC # BLD AUTO: 3.68 M/UL (ref 4.7–6.1)
RBC MORPH BLD: NORMAL
SODIUM SERPL-SCNC: 142 MEQ/L (ref 135–145)
TOTAL CELLS COUNTED BLD: 100
TSH SERPL-ACNC: 1.4 UIU/ML (ref 0.3–4.5)
URATE SERPL-MCNC: 2.9 MG/DL (ref 3–7.5)
VARIANT LYMPHS NFR BLD MANUAL: 27 % (ref 20–45)
WBC # BLD AUTO: 5.7 K/UL (ref 4.8–10.8)

## 2017-05-15 RX ADMIN — REPAGLINIDE SCH MG: 1 TABLET ORAL at 06:24

## 2017-05-15 RX ADMIN — INSULIN ASPART SCH UNITS: 100 INJECTION, SOLUTION INTRAVENOUS; SUBCUTANEOUS at 11:58

## 2017-05-15 RX ADMIN — IPRATROPIUM BROMIDE AND ALBUTEROL SULFATE SCH ML: .5; 3 SOLUTION RESPIRATORY (INHALATION) at 19:37

## 2017-05-15 RX ADMIN — REPAGLINIDE SCH MG: 1 TABLET ORAL at 21:04

## 2017-05-15 RX ADMIN — IPRATROPIUM BROMIDE AND ALBUTEROL SULFATE SCH ML: .5; 3 SOLUTION RESPIRATORY (INHALATION) at 14:03

## 2017-05-15 RX ADMIN — INSULIN ASPART SCH UNITS: 100 INJECTION, SOLUTION INTRAVENOUS; SUBCUTANEOUS at 17:47

## 2017-05-15 RX ADMIN — IPRATROPIUM BROMIDE AND ALBUTEROL SULFATE SCH ML: .5; 3 SOLUTION RESPIRATORY (INHALATION) at 07:49

## 2017-05-15 RX ADMIN — ASPIRIN 81 MG SCH MG: 81 TABLET ORAL at 08:30

## 2017-05-15 RX ADMIN — IPRATROPIUM BROMIDE AND ALBUTEROL SULFATE SCH ML: .5; 3 SOLUTION RESPIRATORY (INHALATION) at 00:49

## 2017-05-15 RX ADMIN — INSULIN ASPART SCH UNITS: 100 INJECTION, SOLUTION INTRAVENOUS; SUBCUTANEOUS at 06:30

## 2017-05-15 RX ADMIN — METOPROLOL TARTRATE SCH MG: 25 TABLET, FILM COATED ORAL at 21:04

## 2017-05-15 RX ADMIN — DEXTROSE MONOHYDRATE SCH MLS/HR: 50 INJECTION, SOLUTION INTRAVENOUS at 06:24

## 2017-05-15 RX ADMIN — DEXTROSE MONOHYDRATE SCH MLS/HR: 50 INJECTION, SOLUTION INTRAVENOUS at 21:03

## 2017-05-15 RX ADMIN — REPAGLINIDE SCH MG: 1 TABLET ORAL at 14:08

## 2017-05-15 RX ADMIN — DEXTROSE MONOHYDRATE SCH MLS/HR: 50 INJECTION, SOLUTION INTRAVENOUS at 14:59

## 2017-05-15 RX ADMIN — METOPROLOL TARTRATE SCH MG: 25 TABLET, FILM COATED ORAL at 08:32

## 2017-05-15 RX ADMIN — DOCUSATE SODIUM SCH MG: 50 LIQUID ORAL at 08:31

## 2017-05-15 RX ADMIN — DOCUSATE SODIUM SCH MG: 50 LIQUID ORAL at 17:45

## 2017-05-15 RX ADMIN — INSULIN ASPART SCH UNITS: 100 INJECTION, SOLUTION INTRAVENOUS; SUBCUTANEOUS at 00:00

## 2017-05-15 NOTE — WOUND CARE CONSULTATION
Wound Assessment


Wound Assessment #1:  


   Wound Number:  #1


   Wound Present on Admission:  Yes


   New Wound:  No


   Status Change of Wound:  No


   Wound Location Body Site:  sacral


   Wound Type:  pressure ulcer


   Elpidio Test:  Does not Elpidio


   Pressure Ulcer Stage:  deep tissue injury - scattered DTI's, also noted 

scattered fullthickness scar tissue to surrounding tissue


   Wound Thickness:  Full Thickness


   Wound Length:  8.0 - scattered


   Wound Width:  8.0 - scattered


   Wound Depth:  utd


   Percent of Wound Purple/Maroon:  100


   Wound Drainage Amount:  None


   Wound Drainage Odor:  None/Absent


   Tissue Surrounding Wound:  Erythemic


   Wound General Appearance:  Reddened


Wound Assessment #2:  


   Wound Number:  #2


   Wound Present on Admission:  Yes


   New Wound:  No


   Status Change of Wound:  No


   Wound Location Body Site Modif:  right, lateral


   Wound Location Body Site:  malleolus/ankle


   Wound Type:  pressure ulcer


   Elpidio Test:  Does not Elpidio


   Pressure Ulcer Stage:  deep tissue injury


   Wound Thickness:  Full Thickness


   Wound Length:  0.5


   Wound Width:  0.5


   Wound Depth:  utd


   Percent of Wound Purple/Maroon:  100


   Wound Drainage Amount:  None


   Wound Drainage Odor:  None/Absent


   Tissue Surrounding Wound:  Erythemic


   Wound General Appearance:  Reddened


Wound Assessment #3:  


   Wound Number:  #3


   Wound Present on Admission:  Yes


   New Wound:  No


   Status Change of Wound:  No


   Wound Location Body Site Modif:  left


   Wound Location Body Site:  knee


   Wound Type:  pressure ulcer


   Elpidio Test:  Does not Elpidio


   Pressure Ulcer Stage:  deep tissue injury - suspected


   Wound Thickness:  Full Thickness


   Wound Length:  4.5


   Wound Width:  1.5


   Wound Depth:  utd


   Percent of Wound Purple/Maroon:  100


   Wound Drainage Amount:  None


   Wound Drainage Odor:  None/Absent


   Tissue Surrounding Wound:  Erythemic


   Wound General Appearance:  Reddened


Wound Assessment #4:  


   Wound Number:  #4


   Wound Present on Admission:  Yes


   New Wound:  No


   Status Change of Wound:  No


   Wound Location Body Site Modif:  left


   Wound Location Body Site:  arm


   Wound Type:  ecchymosis - open


   Elpidio Test:  Does not Elpidio


   Wound Thickness:  Partial Thickness


   Wound Length:  0.8


   Wound Width:  0.8


   Wound Depth:  <0.1


   Percent of Wound Pink/Red:  100


   Wound Drainage Description:  Serosanguineous


   Wound Drainage Amount:  Scant


   Wound Drainage Odor:  None/Absent


   Tissue Surrounding Wound:  Erythemic


   Wound General Appearance:  Reddened


Wound Comment


#1 Sacral scattered deep tissue injuries.


#2 Right lateral Malleolus Deep tissue injury.


#3 Left knee suspected deep tissue injury.


#4 Left arm open ecchymosis.


#5 Left and right upper extremities scattered ecchymosis.


#6 Left and right lower extremities scattered discoloration.








Recommendation.


-Local wound care as ordered.


-Turn and reposition.


- Keep clean and dry.


- Optimize nutrition.


-Avoid shear and friction.


-Offload affected sites.


-Heel protectors.


-Apply low air loss overlay mattress SPR. 


-Assess and notify MD for any changes of condition of skin.











RENNY WHITT May 15, 2017 15:14

## 2017-05-15 NOTE — PULMONOLOGY PROGRESS NOTE
Assessment/Plan


Problems:  


(1) Sepsis


(2) Diabetes mellitus


(3) Pneumonia


(4) Feeding by G-tube


(5) Dementia


Assessment/Plan


continue abx


all cultures negative so far


tolerating feeding


check electrolytes


dvt prophylaxis





Subjective


ROS Limited/Unobtainable:  No


Allergies:  


Coded Allergies:  


     No Known Allergies (Unverified , 3/23/16)





Objective





Last 24 Hour Vital Signs








  Date Time  Temp Pulse Resp B/P Pulse Ox O2 Delivery O2 Flow Rate FiO2


 


5/15/17 21:04  83  118/59    


 


5/15/17 20:00 97.6 83 19 118/59 98 Room Air  


 


5/15/17 19:40        28


 


5/15/17 19:40  68 16  99 Nasal Cannula 2.0 28


 


5/15/17 19:40  67 16   Nasal Cannula 3.0 28


 


5/15/17 19:39  61 18   Nasal Cannula 3.0 32


 


5/15/17 16:05 97.0 69 19 104/56 99 Nasal Cannula 2.0 


 


5/15/17 14:09  72 14  99 Nasal Cannula 2.0 28


 


5/15/17 14:03        28


 


5/15/17 14:03  75 13   Nasal Cannula 3.0 28


 


5/15/17 12:11 97.0 67 19 98/55 99 Nasal Cannula 2.0 


 


5/15/17 08:56 97.5 70 20 106/57 98 Room Air  


 


5/15/17 08:32  70  106/57    


 


5/15/17 07:57  71 14  99 Nasal Cannula 2.0 28


 


5/15/17 07:49  69 14   Nasal Cannula 3.0 28


 


5/15/17 07:49        28


 


5/15/17 07:49  69 14   Nasal Cannula 3.0 32


 


5/15/17 04:00 97.3 69 18 111/61 99 Nasal Cannula 2.0 


 


5/15/17 01:05  78 16  100 Nasal Cannula 2.0 28


 


5/15/17 01:05        28


 


5/15/17 00:49  78 16   Nasal Cannula 3.0 28


 


5/14/17 23:42 97.5 77 18 108/63 98 Room Air  

















Intake and Output  


 


 5/14/17 5/15/17





 19:00 07:00


 


Intake Total  1055.0 ml


 


Output Total 1400 ml 950 ml


 


Balance -1400 ml 105.0 ml


 


  


 


Intake Free Water  50 ml


 


IV Total  505.0 ml


 


Tube Feeding  440 ml


 


Other  60 ml


 


Output Urine Total 1400 ml 950 ml


 


# Bowel Movements 1 








Objective





General Appearance:  WD/WN


HEENT:  normocephalic


Respiratory/Chest:  chest wall non-tender, normal breath sounds


Cardiovascular:  normal peripheral pulses, normal rate


Abdomen:  normal bowel sounds, soft, non tender, gtube in place


Genitourinary:  normal external genitalia


Extremities:  no cyanosis


Skin:  no rash, no ulcers


Lymphatic:  no neck adenopathy





Microbiology








 Date/Time


Source Procedure


Growth Status


 


 


 5/14/17 00:59


Blood Blood Culture - Preliminary


NO GROWTH AFTER 24 HOURS Resulted


 


 5/14/17 00:45


Blood Blood Culture - Preliminary


NO GROWTH AFTER 24 HOURS Resulted





 5/14/17 03:30


Wound Gram Stain - Final Resulted


 


 5/14/17 03:30


Wound Wound Culture - Preliminary Resulted


 


 5/14/17 01:25


Urine,Clean Catch Urine Culture - Preliminary


NO GROWTH AFTER 24 HOURS Resulted








Laboratory Tests


5/15/17 05:10: 


White Blood Count 5.7, Red Blood Count 3.68L, Hemoglobin 12.2L, Hematocrit 34.3L

, Mean Corpuscular Volume 93, Mean Corpuscular Hemoglobin 33.1H, Mean 

Corpuscular Hemoglobin Concent 35.5, Red Cell Distribution Width 12.1, Platelet 

Count 81L, Mean Platelet Volume 8.6, Neutrophils (%) (Auto) , Lymphocytes (%) (

Auto) , Monocytes (%) (Auto) , Eosinophils (%) (Auto) , Basophils (%) (Auto) , 

Differential Total Cells Counted 100, Neutrophils % (Manual) 60, Lymphocytes % (

Manual) 27, Monocytes % (Manual) 7, Eosinophils % (Manual) 6H, Basophils % (

Manual) 0, Band Neutrophils 0, Platelet Estimate DecreasedL, Platelet 

Morphology Normal, Red Blood Cell Morphology Normal, Sodium Level 142, 

Potassium Level 4.3, Chloride Level 103, Carbon Dioxide Level 28, Anion Gap 11, 

Blood Urea Nitrogen 15, Creatinine 0.8, Estimat Glomerular Filtration Rate , 

Glucose Level 167H, Hemoglobin A1c 6.4H, Uric Acid 2.9L, Calcium Level 9.2, 

Phosphorus Level 2.8, Magnesium Level 1.8, Total Bilirubin 0.9, Gamma Glutamyl 

Transpeptidase 13, Aspartate Amino Transf (AST/SGOT) 12, Alanine 

Aminotransferase (ALT/SGPT) 14, Alkaline Phosphatase 69, Total Creatine Kinase 

25L, C-Reactive Protein, Quantitative 4.2H, Pro-B-Type Natriuretic Peptide 314, 

Total Protein 5.9L, Albumin 2.8L, Globulin 3.1, Albumin/Globulin Ratio 0.9L, 

Triglycerides Level 54, Cholesterol Level 68, LDL Cholesterol 28L, HDL 

Cholesterol 29, Cholesterol/HDL Ratio 2.3L, Thyroid Stimulating Hormone (TSH) 

1.400





Current Medications








 Medications


  (Trade)  Dose


 Ordered  Sig/Shasta


 Route


 PRN Reason  Start Time


 Stop Time Status Last Admin


Dose Admin


 


 Acetaminophen


  (Tylenol)  650 mg  Q4H  PRN


 GT


 Mild Pain/Temp > 100.5  5/15/17 00:30


 6/14/17 00:29   


 


 


 Albuterol/


 Ipratropium


  (DuoNeb


 0.5-3(2.5)mg/3ml)  3 ml  Q6HRT


 HHN


   5/15/17 01:00


 5/20/17 00:59  5/15/17 19:37


 


 


 Aspirin


  (ASA)  81 mg  DAILY


 GT


   5/15/17 09:00


 6/14/17 08:59  5/15/17 08:30


 


 


 Atorvastatin


 Calcium


  (Lipitor)  10 mg  BEDTIME


 GT


   5/15/17 21:00


 6/14/17 20:59  5/15/17 21:04


 


 


 Dextrose


  (Dextrose 50%)    STAT  PRN


 IV


 Hypoglycemia  5/15/17 13:45


 6/14/17 13:44   


 


 


 Docusate Sodium


  (Colace)  100 mg  BID


 GT


   5/15/17 09:00


 6/14/17 08:59  5/15/17 17:45


 


 


 Insulin Aspart


  (NovoLOG)    EVERY 6  HOURS


 SUBQ


   5/15/17 00:00


 6/14/17 00:00  5/15/17 17:47


 


 


 Metoprolol


 Tartrate


  (Lopressor)  12.5 mg  Q12HR


 GT


   5/15/17 09:00


 6/14/17 08:59  5/15/17 21:04


 


 


 Ondansetron HCl


  (Zofran)  4 mg  Q8H  PRN


 IVP


 Nausea & Vomiting  5/15/17 04:30


 6/14/17 04:29   


 


 


 Piperacillin Sod/


 Tazobactam Sod/


 Dextrose


  (Zosyn/D5W)  110 ml @ 


 27.5 mls/hr  EVERY 8  HOURS


 IVPB


   5/15/17 06:00


 5/20/17 05:59  5/15/17 21:03


 


 


 Promethazine HCl/


 Codeine


  (Phenergan with


 Codeine)  5 ml  Q4H  PRN


 GT


 For Cough  5/15/17 00:00


 6/14/17 00:00   


 


 


 Ranitidine HCl


  (Zantac)  150 mg  DAILY


 GT


   5/15/17 09:00


 6/14/17 08:59  5/15/17 08:30


 


 


 Repaglinide


  (Prandin)  2 mg  Q8HR


 GT


   5/15/17 06:00


 6/14/17 05:59  5/15/17 21:04


 


 


 Tamsulosin HCl


  (Flomax)  0.4 mg  DAILY


 ORAL


   5/16/17 09:00


 6/15/17 08:59   


 

















JOAN DEMARCO May 15, 2017 22:50

## 2017-05-15 NOTE — GENERAL PROGRESS NOTE
Assessment/Plan


Status:  doing well


Assessment/Plan


status:





This 87-year-old comes in with fever and chest


congestion.  The chest x-ray reports central pulmonary vascular


congestion.  No definite pneumonia.  The patient has evidence of urinary


tract infection with the possibility of aspiration.





Plan: 





Zosyn


HHN


continue the rest





Subjective


ROS Limited/Unobtainable:  No


Constitutional:  Reports: malaise, other, weakness


Allergies:  


Coded Allergies:  


     No Known Allergies (Unverified , 3/23/16)





Objective





Last 24 Hour Vital Signs








  Date Time  Temp Pulse Resp B/P Pulse Ox O2 Delivery O2 Flow Rate FiO2


 


5/15/17 16:05 97.0 69 19 104/56 99 Nasal Cannula 2.0 


 


5/15/17 14:09  72 14  99 Nasal Cannula 2.0 28


 


5/15/17 14:03        28


 


5/15/17 14:03  75 13   Nasal Cannula 3.0 28


 


5/15/17 12:11 97.0 67 19 98/55 99 Nasal Cannula 2.0 


 


5/15/17 08:56 97.5 70 20 106/57 98 Room Air  


 


5/15/17 08:32  70  106/57    


 


5/15/17 07:57  71 14  99 Nasal Cannula 2.0 28


 


5/15/17 07:49  69 14   Nasal Cannula 3.0 28


 


5/15/17 07:49        28


 


5/15/17 07:49  69 14   Nasal Cannula 3.0 32


 


5/15/17 04:00 97.3 69 18 111/61 99 Nasal Cannula 2.0 


 


5/15/17 01:05  78 16  100 Nasal Cannula 2.0 28


 


5/15/17 01:05        28


 


5/15/17 00:49  78 16   Nasal Cannula 3.0 28


 


5/14/17 23:42 97.5 77 18 108/63 98 Room Air  


 


5/14/17 21:32  73  107/52    


 


5/14/17 20:00 97.5 72 18 103/56 100 Nasal Cannula 2.0 28


 


5/14/17 19:16  75 16  100 Nasal Cannula 2.0 28


 


5/14/17 19:16        28


 


5/14/17 19:03  76 16   Nasal Cannula 3.0 32


 


5/14/17 19:03  76 16   Nasal Cannula 3.0 28

















Intake and Output  


 


 5/14/17 5/15/17





 19:00 07:00


 


Intake Total  1055.0 ml


 


Output Total 1400 ml 950 ml


 


Balance -1400 ml 105.0 ml


 


  


 


Intake Free Water  50 ml


 


IV Total  505.0 ml


 


Tube Feeding  440 ml


 


Other  60 ml


 


Output Urine Total 1400 ml 950 ml


 


# Bowel Movements 1 








Laboratory Tests


5/15/17 05:10: 


White Blood Count 5.7, Red Blood Count 3.68L, Hemoglobin 12.2L, Hematocrit 34.3L

, Mean Corpuscular Volume 93, Mean Corpuscular Hemoglobin 33.1H, Mean 

Corpuscular Hemoglobin Concent 35.5, Red Cell Distribution Width 12.1, Platelet 

Count 81L, Mean Platelet Volume 8.6, Neutrophils (%) (Auto) , Lymphocytes (%) (

Auto) , Monocytes (%) (Auto) , Eosinophils (%) (Auto) , Basophils (%) (Auto) , 

Differential Total Cells Counted 100, Neutrophils % (Manual) 60, Lymphocytes % (

Manual) 27, Monocytes % (Manual) 7, Eosinophils % (Manual) 6H, Basophils % (

Manual) 0, Band Neutrophils 0, Platelet Estimate DecreasedL, Platelet 

Morphology Normal, Red Blood Cell Morphology Normal, Sodium Level 142, 

Potassium Level 4.3, Chloride Level 103, Carbon Dioxide Level 28, Anion Gap 11, 

Blood Urea Nitrogen 15, Creatinine 0.8, Estimat Glomerular Filtration Rate , 

Glucose Level 167H, Hemoglobin A1c 6.4H, Uric Acid 2.9L, Calcium Level 9.2, 

Phosphorus Level 2.8, Magnesium Level 1.8, Total Bilirubin 0.9, Gamma Glutamyl 

Transpeptidase 13, Aspartate Amino Transf (AST/SGOT) 12, Alanine 

Aminotransferase (ALT/SGPT) 14, Alkaline Phosphatase 69, Total Creatine Kinase 

25L, C-Reactive Protein, Quantitative 4.2H, Pro-B-Type Natriuretic Peptide 314, 

Total Protein 5.9L, Albumin 2.8L, Globulin 3.1, Albumin/Globulin Ratio 0.9L, 

Triglycerides Level 54, Cholesterol Level 68, LDL Cholesterol 28L, HDL 

Cholesterol 29, Cholesterol/HDL Ratio 2.3L, Thyroid Stimulating Hormone (TSH) 

1.400


Height (Feet):  5


Height (Inches):  6.00


Weight (Pounds):  187


General Appearance:  no apparent distress


Neck:  normal alignment, limited range of motion


Cardiovascular:  normal rate


Respiratory/Chest:  decreased breath sounds


Abdomen:  soft, other - PEG


Objective


other PE not changed











NAY CALDWELL May 15, 2017 19:03

## 2017-05-16 VITALS — SYSTOLIC BLOOD PRESSURE: 136 MMHG | DIASTOLIC BLOOD PRESSURE: 59 MMHG

## 2017-05-16 VITALS — SYSTOLIC BLOOD PRESSURE: 120 MMHG | DIASTOLIC BLOOD PRESSURE: 75 MMHG

## 2017-05-16 VITALS — DIASTOLIC BLOOD PRESSURE: 63 MMHG | SYSTOLIC BLOOD PRESSURE: 121 MMHG

## 2017-05-16 VITALS — DIASTOLIC BLOOD PRESSURE: 68 MMHG | SYSTOLIC BLOOD PRESSURE: 123 MMHG

## 2017-05-16 VITALS — DIASTOLIC BLOOD PRESSURE: 69 MMHG | SYSTOLIC BLOOD PRESSURE: 120 MMHG

## 2017-05-16 VITALS — SYSTOLIC BLOOD PRESSURE: 107 MMHG | DIASTOLIC BLOOD PRESSURE: 61 MMHG

## 2017-05-16 LAB
APPEARANCE UR: CLEAR
BACTERIA #/AREA URNS HPF: (no result) /HPF
KETONES UR QL STRIP: NEGATIVE
LEUKOCYTE ESTERASE UR QL STRIP: (no result)
NITRITE UR QL STRIP: NEGATIVE
PH UR STRIP: 8 [PH] (ref 4.5–8)
PROT UR QL STRIP: (no result)
RBC #/AREA URNS HPF: (no result) /HPF (ref 0–0)
SP GR UR STRIP: 1.01 (ref 1–1.03)
SQUAMOUS #/AREA URNS LPF: (no result) /LPF
UROBILINOGEN UR-MCNC: NORMAL MG/DL (ref 0–1)

## 2017-05-16 RX ADMIN — IPRATROPIUM BROMIDE AND ALBUTEROL SULFATE SCH ML: .5; 3 SOLUTION RESPIRATORY (INHALATION) at 19:57

## 2017-05-16 RX ADMIN — INSULIN ASPART SCH UNITS: 100 INJECTION, SOLUTION INTRAVENOUS; SUBCUTANEOUS at 00:11

## 2017-05-16 RX ADMIN — REPAGLINIDE SCH MG: 1 TABLET ORAL at 05:53

## 2017-05-16 RX ADMIN — DOCUSATE SODIUM SCH MG: 50 LIQUID ORAL at 08:15

## 2017-05-16 RX ADMIN — INSULIN ASPART SCH UNITS: 100 INJECTION, SOLUTION INTRAVENOUS; SUBCUTANEOUS at 11:59

## 2017-05-16 RX ADMIN — IPRATROPIUM BROMIDE AND ALBUTEROL SULFATE SCH ML: .5; 3 SOLUTION RESPIRATORY (INHALATION) at 07:50

## 2017-05-16 RX ADMIN — REPAGLINIDE SCH MG: 1 TABLET ORAL at 13:58

## 2017-05-16 RX ADMIN — METOPROLOL TARTRATE SCH MG: 25 TABLET, FILM COATED ORAL at 08:16

## 2017-05-16 RX ADMIN — ASPIRIN 81 MG SCH MG: 81 TABLET ORAL at 08:15

## 2017-05-16 RX ADMIN — METOPROLOL TARTRATE SCH MG: 25 TABLET, FILM COATED ORAL at 20:49

## 2017-05-16 RX ADMIN — INSULIN ASPART SCH UNITS: 100 INJECTION, SOLUTION INTRAVENOUS; SUBCUTANEOUS at 18:24

## 2017-05-16 RX ADMIN — IPRATROPIUM BROMIDE AND ALBUTEROL SULFATE SCH ML: .5; 3 SOLUTION RESPIRATORY (INHALATION) at 13:17

## 2017-05-16 RX ADMIN — INSULIN ASPART SCH UNITS: 100 INJECTION, SOLUTION INTRAVENOUS; SUBCUTANEOUS at 05:56

## 2017-05-16 RX ADMIN — REPAGLINIDE SCH MG: 1 TABLET ORAL at 20:49

## 2017-05-16 RX ADMIN — INSULIN ASPART SCH UNITS: 100 INJECTION, SOLUTION INTRAVENOUS; SUBCUTANEOUS at 23:29

## 2017-05-16 RX ADMIN — IPRATROPIUM BROMIDE AND ALBUTEROL SULFATE SCH ML: .5; 3 SOLUTION RESPIRATORY (INHALATION) at 00:57

## 2017-05-16 RX ADMIN — DOCUSATE SODIUM SCH MG: 50 LIQUID ORAL at 18:00

## 2017-05-16 RX ADMIN — TAMSULOSIN HYDROCHLORIDE SCH MG: 0.4 CAPSULE ORAL at 08:15

## 2017-05-16 RX ADMIN — DEXTROSE MONOHYDRATE SCH MLS/HR: 50 INJECTION, SOLUTION INTRAVENOUS at 05:04

## 2017-05-16 NOTE — PULMONOLOGY PROGRESS NOTE
Assessment/Plan


Problems:  


(1) Sepsis


(2) Diabetes mellitus


(3) Pneumonia


(4) Feeding by G-tube


(5) Dementia


Assessment/Plan


continue abx


all cultures negative so far


tolerating feeding


check electrolytes


dvt prophylaxis





Subjective


Allergies:  


Coded Allergies:  


     No Known Allergies (Unverified , 3/23/16)





Objective





Last 24 Hour Vital Signs








  Date Time  Temp Pulse Resp B/P Pulse Ox O2 Delivery O2 Flow Rate FiO2


 


5/16/17 20:49  76  120/75    


 


5/16/17 20:01  78 18  100 Nasal Cannula 2.0 28


 


5/16/17 20:01  76 18  100 Nasal Cannula 2.0 28


 


5/16/17 20:00      Nasal Cannula 2.0 28


 


5/16/17 20:00 97.7 70 18 120/75 98 Nasal Cannula 2.0 28


 


5/16/17 19:59     98 Nasal Cannula 2.0 28


 


5/16/17 16:00 97.0 80 18 121/63 100 Nasal Cannula 2.0 


 


5/16/17 13:22  76 16  100 Nasal Cannula 2.0 28 5/16/17 13:10  72 16  100 Nasal Cannula 2.0 28 5/16/17 12:00 97.3 66 16 107/61 99 Nasal Cannula 2.0 


 


5/16/17 08:16  75  123/68    


 


5/16/17 08:11 97.5 75 18 123/68 100 Nasal Cannula 2.0 


 


5/16/17 07:48  78 16  100 Nasal Cannula 2.0 28


 


5/16/17 07:38  74 18  100 Nasal Cannula 2.0 28 5/16/17 07:36      Nasal Cannula 2.0 28 5/16/17 07:34     100 Nasal Cannula 2.0 28 5/16/17 04:00 97.3 99 18 136/59  Room Air  


 


5/16/17 00:59  59 16  100 Nasal Cannula 2.0 28


 


5/16/17 00:57  65 18  100 Nasal Cannula 3.0 28


 


5/16/17 00:00 97.3 70 21 120/69 90 Nasal Cannula 3.5 

















Intake and Output  


 


 5/15/17 5/16/17





 18:59 06:59


 


Intake Total 1102.5 ml 765.0 ml


 


Output Total 700 ml 


 


Balance 402.5 ml 765.0 ml


 


  


 


Intake Free Water 150 ml 120 ml


 


IV Total 592.5 ml 165.0 ml


 


Tube Feeding 360 ml 480 ml


 


Output Urine Total 700 ml 








Objective





General Appearance:  WD/WN


HEENT:  normocephalic


Respiratory/Chest:  chest wall non-tender, normal breath sounds


Cardiovascular:  normal peripheral pulses, normal rate


Abdomen:  normal bowel sounds, soft, non tender, gtube in place


Genitourinary:  normal external genitalia


Extremities:  no cyanosis


Skin:  no rash, no ulcers


Lymphatic:  no neck adenopathy





Microbiology








 Date/Time


Source Procedure


Growth Status


 


 


 5/14/17 00:59


Blood Blood Culture - Preliminary


NO GROWTH AFTER 48 HOURS Resulted


 


 5/14/17 00:45


Blood Blood Culture - Preliminary


NO GROWTH AFTER 48 HOURS Resulted





 5/14/17 03:30


Wound Gram Stain - Final Resulted


 


 5/14/17 03:30 Wound Culture - Preliminary


Gram Negative Bacillus 1 Resulted





 5/15/17 08:00


Sputum Gram Stain - Final Resulted


 


 5/15/17 08:00


Sputum Sputum Culture - Preliminary Resulted


 


 5/14/17 01:50


Nasal Nares Left MRSA Culture - Final


NO METHICILLIN RESISTANT STAPH AUREUS... Complete


 


 5/14/17 01:25


Urine,Clean Catch Urine Culture - Final


NO GROWTH AFTER 48 HOURS Complete


 


 5/14/17 06:48


Rectum VRE Culture - Final


Enterococcus Faecalis - Vre Complete








Laboratory Tests


5/16/17 14:18: 


Urine Color Pale yellow, Urine Appearance Clear, Urine pH 8, Urine Specific 

Gravity 1.010, Urine Protein 2+H, Urine Glucose (UA) Negative, Urine Ketones 

Negative, Urine Occult Blood 5+H, Urine Nitrite Negative, Urine Bilirubin 

Negative, Urine Urobilinogen Normal, Urine Leukocyte Esterase 3+H, Urine RBC 20-

30H, Urine WBC 10-15H, Urine Squamous Epithelial Cells None, Urine Bacteria 

ModerateH





Current Medications








 Medications


  (Trade)  Dose


 Ordered  Sig/Shasta


 Route


 PRN Reason  Start Time


 Stop Time Status Last Admin


Dose Admin


 


 Acetaminophen


  (Tylenol)  650 mg  Q4H  PRN


 GT


 Mild Pain/Temp > 100.5  5/15/17 00:30


 6/14/17 00:29   


 


 


 Albuterol/


 Ipratropium


  (DuoNeb


 0.5-3(2.5)mg/3ml)  3 ml  Q6HRT


 HHN


   5/15/17 01:00


 5/20/17 00:59  5/16/17 19:57


 


 


 Aspirin


  (ASA)  81 mg  DAILY


 GT


   5/15/17 09:00


 6/14/17 08:59  5/16/17 08:15


 


 


 Atorvastatin


 Calcium


  (Lipitor)  10 mg  BEDTIME


 GT


   5/15/17 21:00


 6/14/17 20:59  5/16/17 20:49


 


 


 Dextrose


  (Dextrose 50%)    STAT  PRN


 IV


 Hypoglycemia  5/15/17 13:45


 6/14/17 13:44   


 


 


 Docusate Sodium


  (Colace)  100 mg  BID


 GT


   5/15/17 09:00


 6/14/17 08:59  5/16/17 08:15


 


 


 Insulin Aspart


  (NovoLOG)    EVERY 6  HOURS


 SUBQ


   5/15/17 00:00


 6/14/17 00:00  5/16/17 18:24


 


 


 Levofloxacin


  (Levaquin)  250 mg  DAILY


 GT


   5/17/17 09:00


 5/24/17 08:59   


 


 


 Metoprolol


 Tartrate


  (Lopressor)  12.5 mg  Q12HR


 GT


   5/15/17 09:00


 6/14/17 08:59  5/16/17 20:49


 


 


 Ondansetron HCl


  (Zofran)  4 mg  Q8H  PRN


 IVP


 Nausea & Vomiting  5/15/17 04:30


 6/14/17 04:29   


 


 


 Promethazine HCl/


 Codeine


  (Phenergan with


 Codeine)  5 ml  Q4H  PRN


 GT


 For Cough  5/15/17 00:00


 6/14/17 00:00   


 


 


 Ranitidine HCl


  (Zantac)  150 mg  DAILY


 GT


   5/15/17 09:00


 6/14/17 08:59  5/16/17 08:15


 


 


 Repaglinide


  (Prandin)  2 mg  Q8HR


 GT


   5/15/17 06:00


 6/14/17 05:59  5/16/17 20:49


 


 


 Tamsulosin HCl


  (Flomax)  0.4 mg  DAILY


 ORAL


   5/16/17 09:00


 6/15/17 08:59  5/16/17 08:15


 

















JOAN DEMARCO May 16, 2017 22:18

## 2017-05-16 NOTE — DIAGNOSTIC IMAGING REPORT
Indications:  COUGH



Technique:  Portable AP chest



Findings:



Comparison:  5/14/2017



Pulmonary inflation has modestly improved. Elevation of the apparent right

hemidiaphragm persists with overlying linear densities. Visualized portions of left

lung, bilateral pleural surfaces remain clear. Heart size, pulmonary vasculature

remain within normal limits.



IMPRESSION:



Persistent elevation right hemidiaphragm with overlying subsegmental atelectasis



No evidence of acute cardiopulmonary disease, unchanged

## 2017-05-16 NOTE — GENERAL PROGRESS NOTE
Assessment/Plan


Status:  stable


Assessment/Plan


status:





This 87-year-old comes in with fever and chest


congestion.  The chest x-ray reports central pulmonary vascular


congestion.  No definite pneumonia.  The patient has evidence of urinary


tract infection with the possibility of aspiration.





Plan: 





Zosyn


HHN


continue the rest





CXR: Persistent elevation right hemidiaphragm with overlying subsegmental 

atelectasis





Subjective


ROS Limited/Unobtainable:  No


Constitutional:  Reports: weakness


Allergies:  


Coded Allergies:  


     No Known Allergies (Unverified , 3/23/16)





Objective





Last 24 Hour Vital Signs








  Date Time  Temp Pulse Resp B/P Pulse Ox O2 Delivery O2 Flow Rate FiO2


 


5/16/17 13:22  76 16  100 Nasal Cannula 2.0 28


 


5/16/17 13:10  72 16  100 Nasal Cannula 2.0 28


 


5/16/17 12:00 97.3 66 16 107/61 99 Nasal Cannula 2.0 


 


5/16/17 08:16  75  123/68    


 


5/16/17 08:11 97.5 75 18 123/68 100 Nasal Cannula 2.0 


 


5/16/17 07:48  78 16  100 Nasal Cannula 2.0 28


 


5/16/17 07:38  74 18  100 Nasal Cannula 2.0 28


 


5/16/17 07:36      Nasal Cannula 2.0 28


 


5/16/17 07:34     100 Nasal Cannula 2.0 28


 


5/16/17 04:00 97.3 99 18 136/59  Room Air  


 


5/16/17 00:59  59 16  100 Nasal Cannula 2.0 28


 


5/16/17 00:57  65 18  100 Nasal Cannula 3.0 28


 


5/16/17 00:00 97.3 70 21 120/69 90 Nasal Cannula 3.5 


 


5/15/17 21:04  83  118/59    


 


5/15/17 20:00 97.6 83 19 118/59 98 Room Air  


 


5/15/17 19:40        28


 


5/15/17 19:40  68 16  99 Nasal Cannula 2.0 28


 


5/15/17 19:40  67 16   Nasal Cannula 3.0 28


 


5/15/17 19:39  61 18   Nasal Cannula 3.0 32


 


5/15/17 16:05 97.0 69 19 104/56 99 Nasal Cannula 2.0 


 


5/15/17 14:09  72 14  99 Nasal Cannula 2.0 28


 


5/15/17 14:03        28


 


5/15/17 14:03  75 13   Nasal Cannula 3.0 28

















Intake and Output  


 


 5/15/17 5/16/17





 19:00 07:00


 


Intake Total 1170.0 ml 765.0 ml


 


Output Total 700 ml 


 


Balance 470.0 ml 765.0 ml


 


  


 


Intake Free Water 150 ml 120 ml


 


IV Total 620.0 ml 165.0 ml


 


Tube Feeding 400 ml 480 ml


 


Output Urine Total 700 ml 








Height (Feet):  5


Height (Inches):  6.00


Weight (Pounds):  188


General Appearance:  no apparent distress


Cardiovascular:  normal rate


Respiratory/Chest:  decreased breath sounds


Abdomen:  soft


Genitourinary/Rectal:  other - agarwal +


Objective


other PE not changed











NAY CALDWELL May 16, 2017 13:38

## 2017-05-17 VITALS — DIASTOLIC BLOOD PRESSURE: 66 MMHG | SYSTOLIC BLOOD PRESSURE: 116 MMHG

## 2017-05-17 VITALS — SYSTOLIC BLOOD PRESSURE: 108 MMHG | DIASTOLIC BLOOD PRESSURE: 62 MMHG

## 2017-05-17 VITALS — SYSTOLIC BLOOD PRESSURE: 114 MMHG | DIASTOLIC BLOOD PRESSURE: 63 MMHG

## 2017-05-17 VITALS — SYSTOLIC BLOOD PRESSURE: 120 MMHG | DIASTOLIC BLOOD PRESSURE: 58 MMHG

## 2017-05-17 VITALS — SYSTOLIC BLOOD PRESSURE: 124 MMHG | DIASTOLIC BLOOD PRESSURE: 66 MMHG

## 2017-05-17 LAB
ALBUMIN/GLOB SERPL: 0.8 {RATIO} (ref 1–2.7)
ALT SERPL-CCNC: 14 U/L (ref 3–41)
ANION GAP SERPL CALC-SCNC: 12 MMOL/L (ref 5–15)
AST SERPL-CCNC: 15 U/L (ref 5–40)
BASOPHILS NFR BLD MANUAL: 0 % (ref 0–2)
CALCIUM SERPL-MCNC: 9.9 MG/DL (ref 8.6–10.2)
CHLORIDE SERPL-SCNC: 102 MEQ/L (ref 98–107)
CO2 SERPL-SCNC: 29 MEQ/L (ref 20–30)
CREAT SERPL-MCNC: 0.7 MG/DL (ref 0.7–1.2)
CRP SERPL-MCNC: 1.4 MG/DL (ref ?–0.5)
EOSINOPHIL NFR BLD MANUAL: 6 % (ref 0–3)
ERYTHROCYTE [DISTWIDTH] IN BLOOD BY AUTOMATED COUNT: 12.4 % (ref 11.6–14.8)
GLOBULIN SER-MCNC: 3.6 G/DL
HEMOLYSIS: 9
MAGNESIUM SERPL-MCNC: 1.8 MG/DL (ref 1.7–2.5)
MCH RBC QN AUTO: 31.7 PG (ref 27–31)
MCHC RBC AUTO-ENTMCNC: 33 G/DL (ref 32–36)
MCV RBC AUTO: 96 FL (ref 80–99)
NEUTS BAND NFR BLD MANUAL: 0 % (ref 0–8)
NEUTS BAND NFR BLD MANUAL: 57 % (ref 45–75)
PHOSPHATE SERPL-MCNC: 2.8 MG/DL (ref 2.5–4.8)
PLAT MORPH BLD: NORMAL
PLATELET # BLD EST: (no result) 10*3/UL
PLATELET # BLD: 96 K/UL (ref 150–450)
PMV BLD AUTO: 8.2 FL (ref 6.5–10.1)
POTASSIUM SERPL-SCNC: 4.2 MEQ/L (ref 3.4–4.9)
PROT SERPL-MCNC: 6.7 G/DL (ref 6.6–8.7)
RBC # BLD AUTO: 4.18 M/UL (ref 4.7–6.1)
RBC MORPH BLD: NORMAL
SODIUM SERPL-SCNC: 143 MEQ/L (ref 135–145)
TOTAL CELLS COUNTED BLD: 100
VARIANT LYMPHS NFR BLD MANUAL: 28 % (ref 20–45)
WBC # BLD AUTO: 6.4 K/UL (ref 4.8–10.8)

## 2017-05-17 RX ADMIN — DOCUSATE SODIUM SCH MG: 50 LIQUID ORAL at 17:53

## 2017-05-17 RX ADMIN — INSULIN ASPART SCH UNITS: 100 INJECTION, SOLUTION INTRAVENOUS; SUBCUTANEOUS at 12:09

## 2017-05-17 RX ADMIN — METOPROLOL TARTRATE SCH MG: 25 TABLET, FILM COATED ORAL at 09:47

## 2017-05-17 RX ADMIN — TAMSULOSIN HYDROCHLORIDE SCH MG: 0.4 CAPSULE ORAL at 09:40

## 2017-05-17 RX ADMIN — ASPIRIN 81 MG SCH MG: 81 TABLET ORAL at 09:50

## 2017-05-17 RX ADMIN — INSULIN ASPART SCH UNITS: 100 INJECTION, SOLUTION INTRAVENOUS; SUBCUTANEOUS at 05:44

## 2017-05-17 RX ADMIN — REPAGLINIDE SCH MG: 1 TABLET ORAL at 14:55

## 2017-05-17 RX ADMIN — REPAGLINIDE SCH MG: 1 TABLET ORAL at 05:43

## 2017-05-17 RX ADMIN — INSULIN ASPART SCH UNITS: 100 INJECTION, SOLUTION INTRAVENOUS; SUBCUTANEOUS at 18:37

## 2017-05-17 RX ADMIN — IPRATROPIUM BROMIDE AND ALBUTEROL SULFATE SCH ML: .5; 3 SOLUTION RESPIRATORY (INHALATION) at 13:43

## 2017-05-17 RX ADMIN — IPRATROPIUM BROMIDE AND ALBUTEROL SULFATE SCH ML: .5; 3 SOLUTION RESPIRATORY (INHALATION) at 01:09

## 2017-05-17 RX ADMIN — DOCUSATE SODIUM SCH MG: 50 LIQUID ORAL at 09:49

## 2017-05-17 RX ADMIN — IPRATROPIUM BROMIDE AND ALBUTEROL SULFATE SCH ML: .5; 3 SOLUTION RESPIRATORY (INHALATION) at 07:17

## 2017-05-17 NOTE — PULMONOLOGY PROGRESS NOTE
Assessment/Plan


Problems:  


(1) Sepsis


(2) Diabetes mellitus


(3) Pneumonia


(4) Feeding by G-tube


(5) Dementia


Assessment/Plan


continue abx


all cultures negative so far


tolerating feeding


check electrolytes


dvt prophylaxis





Subjective


Allergies:  


Coded Allergies:  


     No Known Allergies (Unverified , 3/23/16)





Objective





Last 24 Hour Vital Signs








  Date Time  Temp Pulse Resp B/P Pulse Ox O2 Delivery O2 Flow Rate FiO2


 


5/17/17 16:00 97.2 79 18 114/63 97 Nasal Cannula 2.0 


 


5/17/17 13:45  83 18  100 Nasal Cannula 2.0 28


 


5/17/17 13:37  80 16   Nasal Cannula 2.0 28


 


5/17/17 11:44 96.2 81 15 108/62 97 Nasal Cannula 2.0 


 


5/17/17 09:47  90  104/70    


 


5/17/17 08:07 96.2 99 19 124/66 99 Nasal Cannula 2.0 


 


5/17/17 07:20  85 18  100 Nasal Cannula 2.0 28


 


5/17/17 07:19      Nasal Cannula 2.0 28


 


5/17/17 07:08  81 16  97 Nasal Cannula 2.0 28


 


5/17/17 07:05     97 Nasal Cannula 2.0 28


 


5/17/17 04:35 97.7 81 19 120/58 97 Room Air  


 


5/17/17 01:10  78 18  100 Nasal Cannula 2.0 28


 


5/17/17 01:09  75 16  100 Nasal Cannula 2.0 28


 


5/17/17 00:07 97.0 75 20 116/66 100 Nasal Cannula 2.5 

















Intake and Output  


 


 5/16/17 5/17/17





 19:00 07:00


 


Intake Total 775.0 ml 800 ml


 


Output Total 900 ml 


 


Balance -125.0 ml 800 ml


 


  


 


Intake Free Water 150 ml 200 ml


 


IV Total 55.0 ml 


 


Tube Feeding 570 ml 600 ml


 


Output Urine Total 900 ml 


 


# Bowel Movements 2 








Objective





General Appearance:  WD/WN


HEENT:  normocephalic


Respiratory/Chest:  chest wall non-tender, normal breath sounds


Cardiovascular:  normal peripheral pulses, normal rate


Abdomen:  normal bowel sounds, soft, non tender, gtube in place


Genitourinary:  normal external genitalia


Extremities:  no cyanosis


Skin:  no rash, no ulcers


Lymphatic:  no neck adenopathy





Microbiology








 Date/Time


Source Procedure


Growth Status


 


 


 5/15/17 08:00


Sputum Gram Stain - Final Complete


 


 5/15/17 08:00


Sputum Sputum Culture - Final


NORMAL UPPER RESPIRATORY SHALOM PRESENT Complete


 


 5/16/17 14:18


Urine,Clean Catch Urine Culture - Preliminary


NO GROWTH Resulted








Laboratory Tests


5/17/17 05:20: 


White Blood Count 6.4, Red Blood Count 4.18L, Hemoglobin 13.2L, Hematocrit 40.1L

, Mean Corpuscular Volume 96, Mean Corpuscular Hemoglobin 31.7H, Mean 

Corpuscular Hemoglobin Concent 33.0, Red Cell Distribution Width 12.4, Platelet 

Count 96L, Mean Platelet Volume 8.2, Neutrophils (%) (Auto) , Lymphocytes (%) (

Auto) , Monocytes (%) (Auto) , Eosinophils (%) (Auto) , Basophils (%) (Auto) , 

Differential Total Cells Counted 100, Neutrophils % (Manual) 57, Lymphocytes % (

Manual) 28, Monocytes % (Manual) 9, Eosinophils % (Manual) 6H, Basophils % (

Manual) 0, Band Neutrophils 0, Platelet Estimate DecreasedL, Platelet 

Morphology Normal, Red Blood Cell Morphology Normal, Sodium Level 143, 

Potassium Level 4.2, Chloride Level 102, Carbon Dioxide Level 29, Anion Gap 12, 

Blood Urea Nitrogen 13, Creatinine 0.7, Estimat Glomerular Filtration Rate , 

Glucose Level 121H, Calcium Level 9.9, Phosphorus Level 2.8, Magnesium Level 1.8

, Total Bilirubin 0.9, Aspartate Amino Transf (AST/SGOT) 15, Alanine 

Aminotransferase (ALT/SGPT) 14, Alkaline Phosphatase 73, C-Reactive Protein, 

Quantitative 1.4H, Total Protein 6.7, Albumin 3.1L, Globulin 3.6, Albumin/

Globulin Ratio 0.8L











JOAN DEMARCO May 17, 2017 22:25

## 2017-05-17 NOTE — GENERAL PROGRESS NOTE
Assessment/Plan


Status:  stable


Assessment/Plan


status:





This 87-year-old comes in with fever and chest


congestion.  The chest x-ray reports central pulmonary vascular


congestion.  No definite pneumonia.  The patient has evidence of urinary


tract infection with the possibility of aspiration.





Plan: 





DC Zosyn


start PO Macrobid


DC ECF


continue HHN





continue the rest





CXR: Persistent elevation right hemidiaphragm with overlying subsegmental 

atelectasis





Subjective


ROS Limited/Unobtainable:  No


Constitutional:  Reports: malaise


Allergies:  


Coded Allergies:  


     No Known Allergies (Unverified , 3/23/16)





Objective





Last 24 Hour Vital Signs








  Date Time  Temp Pulse Resp B/P Pulse Ox O2 Delivery O2 Flow Rate FiO2


 


5/17/17 08:07 96.2 99 19 124/66 99 Nasal Cannula 2.0 


 


5/17/17 07:20  85 18  100 Nasal Cannula 2.0 28


 


5/17/17 07:19      Nasal Cannula 2.0 28


 


5/17/17 07:08  81 16  97 Nasal Cannula 2.0 28


 


5/17/17 07:05     97 Nasal Cannula 2.0 28


 


5/17/17 04:35 97.7 81 19 120/58 97 Room Air  


 


5/17/17 01:10  78 18  100 Nasal Cannula 2.0 28


 


5/17/17 01:09  75 16  100 Nasal Cannula 2.0 28


 


5/17/17 00:07 97.0 75 20 116/66 100 Nasal Cannula 2.5 


 


5/16/17 20:49  76  120/75    


 


5/16/17 20:01  78 18  100 Nasal Cannula 2.0 28


 


5/16/17 20:01  76 18  100 Nasal Cannula 2.0 28


 


5/16/17 20:00      Nasal Cannula 2.0 28


 


5/16/17 19:59     98 Nasal Cannula 2.0 28


 


5/16/17 16:00 97.0 80 18 121/63 100 Nasal Cannula 2.0 


 


5/16/17 13:22  76 16  100 Nasal Cannula 2.0 28


 


5/16/17 13:10  72 16  100 Nasal Cannula 2.0 28


 


5/16/17 12:00 97.3 66 16 107/61 99 Nasal Cannula 2.0 

















Intake and Output  


 


 5/16/17 5/17/17





 19:00 07:00


 


Intake Total 775.0 ml 800 ml


 


Output Total 900 ml 


 


Balance -125.0 ml 800 ml


 


  


 


Intake Free Water 150 ml 200 ml


 


IV Total 55.0 ml 


 


Tube Feeding 570 ml 600 ml


 


Output Urine Total 900 ml 


 


# Bowel Movements 2 











Current Medications








 Medications


  (Trade)  Dose


 Ordered  Sig/Shasta


 Route


 PRN Reason  Start Time


 Stop Time Status Last Admin


Dose Admin


 


 Acetaminophen


  (Tylenol)  650 mg  Q4H  PRN


 GT


 Mild Pain/Temp > 100.5  5/15/17 00:30


 6/14/17 00:29   


 


 


 Albuterol/


 Ipratropium


  (DuoNeb


 0.5-3(2.5)mg/3ml)  3 ml  Q6HRT


 HHN


   5/15/17 01:00


 5/20/17 00:59  5/17/17 07:17


 


 


 Aspirin


  (ASA)  81 mg  DAILY


 GT


   5/15/17 09:00


 6/14/17 08:59  5/16/17 08:15


 


 


 Atorvastatin


 Calcium


  (Lipitor)  10 mg  BEDTIME


 GT


   5/15/17 21:00


 6/14/17 20:59  5/16/17 20:49


 


 


 Dextrose


  (Dextrose 50%)    STAT  PRN


 IV


 Hypoglycemia  5/15/17 13:45


 6/14/17 13:44   


 


 


 Docusate Sodium


  (Colace)  100 mg  BID


 GT


   5/15/17 09:00


 6/14/17 08:59  5/16/17 08:15


 


 


 Insulin Aspart


  (NovoLOG)    EVERY 6  HOURS


 SUBQ


   5/15/17 00:00


 6/14/17 00:00  5/17/17 05:44


 


 


 Levofloxacin


  (Levaquin)  250 mg  DAILY


 GT


   5/17/17 09:00


 5/24/17 08:59   


 


 


 Metoprolol


 Tartrate


  (Lopressor)  12.5 mg  Q12HR


 GT


   5/15/17 09:00


 6/14/17 08:59  5/16/17 20:49


 


 


 Ondansetron HCl


  (Zofran)  4 mg  Q8H  PRN


 IVP


 Nausea & Vomiting  5/15/17 04:30


 6/14/17 04:29   


 


 


 Promethazine HCl/


 Codeine


  (Phenergan with


 Codeine)  5 ml  Q4H  PRN


 GT


 For Cough  5/15/17 00:00


 6/14/17 00:00   


 


 


 Ranitidine HCl


  (Zantac)  150 mg  DAILY


 GT


   5/15/17 09:00


 6/14/17 08:59  5/16/17 08:15


 


 


 Repaglinide


  (Prandin)  2 mg  Q8HR


 GT


   5/15/17 06:00


 6/14/17 05:59  5/17/17 05:43


 


 


 Tamsulosin HCl


  (Flomax)  0.4 mg  DAILY


 ORAL


   5/16/17 09:00


 6/15/17 08:59  5/16/17 08:15


 





Laboratory Tests


5/16/17 14:18: 


Urine Color Pale yellow, Urine Appearance Clear, Urine pH 8, Urine Specific 

Gravity 1.010, Urine Protein 2+H, Urine Glucose (UA) Negative, Urine Ketones 

Negative, Urine Occult Blood 5+H, Urine Nitrite Negative, Urine Bilirubin 

Negative, Urine Urobilinogen Normal, Urine Leukocyte Esterase 3+H, Urine RBC 20-

30H, Urine WBC 10-15H, Urine Squamous Epithelial Cells None, Urine Bacteria 

ModerateH


5/17/17 05:20: 


White Blood Count 6.4, Red Blood Count 4.18L, Hemoglobin 13.2L, Hematocrit 40.1L

, Mean Corpuscular Volume 96, Mean Corpuscular Hemoglobin 31.7H, Mean 

Corpuscular Hemoglobin Concent 33.0, Red Cell Distribution Width 12.4, Platelet 

Count 96L, Mean Platelet Volume 8.2, Neutrophils (%) (Auto) , Lymphocytes (%) (

Auto) , Monocytes (%) (Auto) , Eosinophils (%) (Auto) , Basophils (%) (Auto) , 

Neutrophils % (Manual) [Pending], Lymphocytes % (Manual) [Pending], Platelet 

Estimate [Pending], Platelet Morphology [Pending], Sodium Level 143, Potassium 

Level 4.2, Chloride Level 102, Carbon Dioxide Level 29, Anion Gap 12, Blood 

Urea Nitrogen 13, Creatinine 0.7, Estimat Glomerular Filtration Rate , Glucose 

Level 121H, Calcium Level 9.9, Phosphorus Level 2.8, Magnesium Level 1.8, Total 

Bilirubin 0.9, Aspartate Amino Transf (AST/SGOT) 15, Alanine Aminotransferase (

ALT/SGPT) 14, Alkaline Phosphatase 73, C-Reactive Protein, Quantitative 1.4H, 

Total Protein 6.7, Albumin 3.1L, Globulin 3.6, Albumin/Globulin Ratio 0.8L


Height (Feet):  5


Height (Inches):  6.00


Weight (Pounds):  188


General Appearance:  no apparent distress


Cardiovascular:  normal rate


Respiratory/Chest:  decreased breath sounds


Objective


other PE not changed











NAY CALDWELL May 17, 2017 08:56

## 2017-05-17 NOTE — DISCHARGE INSTRUCTIONS
Discharge Instructions


Discharge Instructions


Follow up with:  me in ECF


Diet:  tube feeding


Special Instructions





routine skin care


Aspiration precautions





For Congestive Heart Failure


Reminder


Report to your physician any weight gain of 5 pounds or more in one week.











NAY CALDWELL May 17, 2017 09:00

## 2017-05-18 NOTE — DISCHARGE SUMMARY
Discharge Summary


Hospital Course


Date of Admission


May 14, 2017 at 01:32


Date of Discharge


May 17, 2017 at 20:29


Admitting Diagnosis


SEPSIS, PNEUMONIA


HPI


Jolly Shwa is a 87 year old male who was admitted on May 14, 2017 at 01:32 for 

Sepsis,Pneumonia


Hospital Course


[id number]





Discharge


Discharge Disposition


Patient was discharged to snf


Discharge Diagnoses:  





Discharge Instructions


Discharge Instructions


Follow up with:  me in [facility]











Jojo Harrington NP May 18, 2017 13:24

## 2017-05-18 NOTE — CARDIOLOGY REPORT
--------------- APPROVED REPORT --------------





EKG Measurement

Heart [id number]

SD [id number]

[id number]

[id number]

[id number]





Normal sinus rhythm

Low voltage QRS

Borderline ECG

## 2018-01-24 ENCOUNTER — HOSPITAL ENCOUNTER (INPATIENT)
Dept: HOSPITAL 72 - EMR | Age: 83
LOS: 4 days | Discharge: NURSING, REHAB OR LONG-TERM CARE | DRG: 637 | End: 2018-01-28
Payer: MEDICARE

## 2018-01-24 VITALS — HEIGHT: 75 IN | WEIGHT: 220 LBS | BODY MASS INDEX: 27.35 KG/M2

## 2018-01-24 VITALS — SYSTOLIC BLOOD PRESSURE: 115 MMHG | DIASTOLIC BLOOD PRESSURE: 74 MMHG

## 2018-01-24 VITALS — SYSTOLIC BLOOD PRESSURE: 132 MMHG | DIASTOLIC BLOOD PRESSURE: 78 MMHG

## 2018-01-24 VITALS — SYSTOLIC BLOOD PRESSURE: 129 MMHG | DIASTOLIC BLOOD PRESSURE: 62 MMHG

## 2018-01-24 VITALS — DIASTOLIC BLOOD PRESSURE: 71 MMHG | SYSTOLIC BLOOD PRESSURE: 129 MMHG

## 2018-01-24 DIAGNOSIS — L89.610: ICD-10-CM

## 2018-01-24 DIAGNOSIS — R00.0: ICD-10-CM

## 2018-01-24 DIAGNOSIS — L89.620: ICD-10-CM

## 2018-01-24 DIAGNOSIS — N17.9: ICD-10-CM

## 2018-01-24 DIAGNOSIS — B37.49: ICD-10-CM

## 2018-01-24 DIAGNOSIS — Z86.73: ICD-10-CM

## 2018-01-24 DIAGNOSIS — H10.33: ICD-10-CM

## 2018-01-24 DIAGNOSIS — F03.90: ICD-10-CM

## 2018-01-24 DIAGNOSIS — L03.312: ICD-10-CM

## 2018-01-24 DIAGNOSIS — R74.0: ICD-10-CM

## 2018-01-24 DIAGNOSIS — Z66: ICD-10-CM

## 2018-01-24 DIAGNOSIS — Z79.4: ICD-10-CM

## 2018-01-24 DIAGNOSIS — E87.0: ICD-10-CM

## 2018-01-24 DIAGNOSIS — E11.65: Primary | ICD-10-CM

## 2018-01-24 DIAGNOSIS — R53.2: ICD-10-CM

## 2018-01-24 DIAGNOSIS — M19.90: ICD-10-CM

## 2018-01-24 DIAGNOSIS — N40.0: ICD-10-CM

## 2018-01-24 DIAGNOSIS — I10: ICD-10-CM

## 2018-01-24 DIAGNOSIS — E86.0: ICD-10-CM

## 2018-01-24 DIAGNOSIS — Z43.1: ICD-10-CM

## 2018-01-24 DIAGNOSIS — L89.151: ICD-10-CM

## 2018-01-24 DIAGNOSIS — J44.9: ICD-10-CM

## 2018-01-24 LAB
ADD MANUAL DIFF: YES
ALBUMIN SERPL-MCNC: 3.2 G/DL (ref 3.4–5)
ALBUMIN/GLOB SERPL: 0.7 {RATIO} (ref 1–2.7)
ALP SERPL-CCNC: 116 U/L (ref 46–116)
ALT SERPL-CCNC: 14 U/L (ref 12–78)
ANION GAP SERPL CALC-SCNC: 10 MMOL/L (ref 5–15)
APPEARANCE UR: (no result)
APTT PPP: (no result) S
AST SERPL-CCNC: 14 U/L (ref 15–37)
BILIRUB DIRECT SERPL-MCNC: 0.2 MG/DL (ref 0–0.3)
BILIRUB SERPL-MCNC: 1.3 MG/DL (ref 0.2–1)
BUN SERPL-MCNC: 53 MG/DL (ref 7–18)
CALCIUM SERPL-MCNC: 10.2 MG/DL (ref 8.5–10.1)
CHLORIDE SERPL-SCNC: 116 MMOL/L (ref 98–107)
CO2 SERPL-SCNC: 29 MMOL/L (ref 21–32)
CREAT SERPL-MCNC: 1.5 MG/DL (ref 0.55–1.3)
ERYTHROCYTE [DISTWIDTH] IN BLOOD BY AUTOMATED COUNT: 12.1 % (ref 11.6–14.8)
GLOBULIN SER-MCNC: 4.6 G/DL
GLUCOSE UR STRIP-MCNC: (no result) MG/DL
HCT VFR BLD CALC: 53.1 % (ref 42–52)
HGB BLD-MCNC: 17 G/DL (ref 14.2–18)
KETONES UR QL STRIP: (no result)
LEUKOCYTE ESTERASE UR QL STRIP: (no result)
MCV RBC AUTO: 100 FL (ref 80–99)
NITRITE UR QL STRIP: NEGATIVE
PH UR STRIP: 6 [PH] (ref 4.5–8)
PLATELET # BLD: 97 K/UL (ref 150–450)
POTASSIUM SERPL-SCNC: 4.6 MMOL/L (ref 3.5–5.1)
PROT UR QL STRIP: (no result)
RBC # BLD AUTO: 5.32 M/UL (ref 4.7–6.1)
SODIUM SERPL-SCNC: 155 MMOL/L (ref 136–145)
SP GR UR STRIP: 1.01 (ref 1–1.03)
UROBILINOGEN UR-MCNC: NORMAL MG/DL (ref 0–1)
WBC # BLD AUTO: 9.2 K/UL (ref 4.8–10.8)

## 2018-01-24 PROCEDURE — 85007 BL SMEAR W/DIFF WBC COUNT: CPT

## 2018-01-24 PROCEDURE — 83880 ASSAY OF NATRIURETIC PEPTIDE: CPT

## 2018-01-24 PROCEDURE — 84443 ASSAY THYROID STIM HORMONE: CPT

## 2018-01-24 PROCEDURE — 99285 EMERGENCY DEPT VISIT HI MDM: CPT

## 2018-01-24 PROCEDURE — 94664 DEMO&/EVAL PT USE INHALER: CPT

## 2018-01-24 PROCEDURE — 83036 HEMOGLOBIN GLYCOSYLATED A1C: CPT

## 2018-01-24 PROCEDURE — 81001 URINALYSIS AUTO W/SCOPE: CPT

## 2018-01-24 PROCEDURE — 94640 AIRWAY INHALATION TREATMENT: CPT

## 2018-01-24 PROCEDURE — 81003 URINALYSIS AUTO W/O SCOPE: CPT

## 2018-01-24 PROCEDURE — 36415 COLL VENOUS BLD VENIPUNCTURE: CPT

## 2018-01-24 PROCEDURE — 82607 VITAMIN B-12: CPT

## 2018-01-24 PROCEDURE — 84484 ASSAY OF TROPONIN QUANT: CPT

## 2018-01-24 PROCEDURE — 93005 ELECTROCARDIOGRAM TRACING: CPT

## 2018-01-24 PROCEDURE — 82977 ASSAY OF GGT: CPT

## 2018-01-24 PROCEDURE — 87040 BLOOD CULTURE FOR BACTERIA: CPT

## 2018-01-24 PROCEDURE — 71045 X-RAY EXAM CHEST 1 VIEW: CPT

## 2018-01-24 PROCEDURE — 82009 KETONE BODYS QUAL: CPT

## 2018-01-24 PROCEDURE — 82962 GLUCOSE BLOOD TEST: CPT

## 2018-01-24 PROCEDURE — 84100 ASSAY OF PHOSPHORUS: CPT

## 2018-01-24 PROCEDURE — 87086 URINE CULTURE/COLONY COUNT: CPT

## 2018-01-24 PROCEDURE — 87081 CULTURE SCREEN ONLY: CPT

## 2018-01-24 PROCEDURE — 82248 BILIRUBIN DIRECT: CPT

## 2018-01-24 PROCEDURE — 86140 C-REACTIVE PROTEIN: CPT

## 2018-01-24 PROCEDURE — 82533 TOTAL CORTISOL: CPT

## 2018-01-24 PROCEDURE — 85025 COMPLETE CBC W/AUTO DIFF WBC: CPT

## 2018-01-24 PROCEDURE — 83735 ASSAY OF MAGNESIUM: CPT

## 2018-01-24 PROCEDURE — 80053 COMPREHEN METABOLIC PANEL: CPT

## 2018-01-24 PROCEDURE — 82746 ASSAY OF FOLIC ACID SERUM: CPT

## 2018-01-24 PROCEDURE — 84550 ASSAY OF BLOOD/URIC ACID: CPT

## 2018-01-24 PROCEDURE — 51702 INSERT TEMP BLADDER CATH: CPT

## 2018-01-24 PROCEDURE — 80061 LIPID PANEL: CPT

## 2018-01-24 RX ADMIN — HEPARIN SODIUM SCH UNITS: 5000 INJECTION INTRAVENOUS; SUBCUTANEOUS at 19:45

## 2018-01-24 RX ADMIN — REPAGLINIDE SCH MG: 1 TABLET ORAL at 21:34

## 2018-01-24 RX ADMIN — Medication SCH TAB: at 21:34

## 2018-01-24 RX ADMIN — ALBUTEROL SULFATE SCH MG: 2.5 SOLUTION RESPIRATORY (INHALATION) at 20:39

## 2018-01-24 RX ADMIN — SODIUM CHLORIDE SCH MLS/HR: 0.9 INJECTION INTRAVENOUS at 17:19

## 2018-01-24 RX ADMIN — METOPROLOL TARTRATE SCH MG: 25 TABLET, FILM COATED ORAL at 21:34

## 2018-01-24 RX ADMIN — METOCLOPRAMIDE HYDROCHLORIDE SCH MG: 5 SOLUTION ORAL at 18:00

## 2018-01-24 NOTE — HISTORY & PHYSICAL
History and Physical


History & Physicial


HyperGlycemia


Tachycardia


Dehydration


UTI


Conjunctivitis





Past history of the patient is significant for


dementia, previous percutaneous endoscopic gastrostomy insertion, anemia,


history of urinary tract infection, history of diabetes mellitus,


osteoarthritis, hypertension, and previous dehydration.





'# [id number]











NAY CALDWELL Jan 24, 2018 16:10

## 2018-01-24 NOTE — EMERGENCY ROOM REPORT
History of Present Illness


General


Chief Complaint:  General Complaint


Source:  Medical Record, EMS, PMD





Present Illness


HPI


80-year-old male sent from nursing home for evaluation of hyperglycemia, 

greater than 500


Also associated tachycardia


History otherwise limited due to known dementia


Patient able to follow commands but denies any pain, not answering questions 

otherwise


known history of dementia


Allergies:  


Coded Allergies:  


     No Known Allergies (Unverified , 3/23/16)





Patient History


Past Medical History:  DM, HTN, COPD, renal disease, other - dementia, BPH


Past Surgical History:  unable to obtain


Pertinent Family History:  unable to obtain


Social History:  Denies: smoking, alcohol use, drug use


Immunizations:  UTD


Reviewed Nursing Documentation:  PMH: Agreed, PSxH: Agreed





Nursing Documentation-PMH


Hx Hypertension:  Yes


Hx COPD:  Yes - PNA


Hx Diabetes:  Yes


Hx Cerebrovascular Accident:  Yes - muscle weakness


Hx Dementia:  Yes


Hx Dysphasia:  Yes





Review of Systems


All Other Systems:  limited - Dementia





Physical Exam





Vital Signs








  Date Time  Temp Pulse Resp B/P (MAP) Pulse Ox O2 Delivery O2 Flow Rate FiO2


 


1/24/18 13:58  117 20 126/72 99 Nasal Cannula 2.0 








Sp02 EP Interpretation:  reviewed, normal


General Appearance:  normal inspection, well appearing, no apparent distress, 

alert, GCS 15, non-toxic, Chronically Ill


Head:  normocephalic, atraumatic


Eyes:  bilateral eye PERRL, bilateral eye EOMI


ENT:  normal ENT inspection, hearing grossly normal, normal pharynx, no 

angioedema, normal voice, TMs + canals normal, uvula midline, moist mucus 

membranes


Neck:  normal inspection, full range of motion, supple, thyroid normal, no 

meningismus, no bony tend


Respiratory:  normal inspection, lungs clear, normal breath sounds, no rhonchi, 

no respiratory distress, no retraction, no accessory muscle use, no wheezing, 

speaking full sentences


Cardiovascular #1:  regular rate, rhythm, no edema, no JVD, normal capillary 

refill


Gastrointestinal:  normal inspection, normal bowel sounds, non tender, soft, no 

mass, no peritonitis, non-distended, no guarding, no hernia, no pulsatile mass


Genitourinary:  no CVA tenderness


Musculoskeletal:  normal inspection, back normal, normal range of motion, no 

calf tenderness, pelvis stable, Avani's Sign negative


Neurologic:  normal inspection, alert, responsive, CNs III-XII nml as tested, 

motor strength/tone normal, cerebellar normal, normal gait, speech normal


Psychiatric:  normal inspection, judgement/insight normal, mood/affect normal, 

no suicidal/homicidal ideation, no delusions


Skin:  normal inspection, normal color, no rash


Lymphatic:  normal inspection, no adenopathy





Medical Decision Making


Diagnostic Impression:  


 Primary Impression:  


 Hyperglycemia


 Additional Impressions:  


 CHIP (acute kidney injury)


 Dementia


 Qualified Codes:  F03.90 - Unspecified dementia without behavioral disturbance


 Transaminitis


ER Course


hyperglycemia: labs consistent with CHIP.  Anion gap and potassium level normal.

  not in DKA


CMP also shows transaminitis


Tachycardia likely due to dehydration. Normotensive, afebrile.  


no leukocytosis however there is neutrophilic shift.


chest x-ray does not show pneumonia


agarwal placed to obtain urine; urinalysis pending at time of endorsement


blood cultures and urine cultures pending


empiric antibiotics given, suspected infection as cause of hyperglycemia


endorsed to Dr. Schilling at 328pm for med/surg


Patient is DNR


EKG Diagnostic Results


Rate:  tachycardiac


Rhythm:  NSR


ST Segments:  no acute changes


ASA given to the pt in ED:  No





Rhythm Strip Diag. Results


EP Interpretation:  yes


Rate:  116


Rhythm:  NSR, no PVC's, no ectopy





Chest X-Ray Diagnostic Results


Chest X-Ray Diagnostic Results :  


   Chest X-Ray Ordered:  Yes


   # of Views/Limited/Complete:  1 View


   Indication:  Other - tachycardia


   EP Interpretation:  Yes


   Interpretation:  no consolidation, no effusion, no pneumothorax, no acute 

cardiopulmonary disease


   Impression:  No acute disease


   Electronically Signed by:  Dr Arabella Villalpando MD





Last Vital Signs








  Date Time  Temp Pulse Resp B/P (MAP) Pulse Ox O2 Delivery O2 Flow Rate FiO2


 


1/24/18 13:58  117 20 126/72 99 Nasal Cannula 2.0 








Status:  improved


Disposition:  ADMITTED AS INPATIENT


Condition:  Serious











ARABELLA VILLALPANDO M.D. Jan 24, 2018 14:27

## 2018-01-24 NOTE — DIAGNOSTIC IMAGING REPORT
Indication: Reason For Exam: SOB

 

Technique: One view of the chest

 

Comparison: 5/16/2017

 

Findings: The lungs and pleural spaces are clear. The heart size is normal. No

significant interim change

 

Impression: Negative

## 2018-01-25 VITALS — DIASTOLIC BLOOD PRESSURE: 84 MMHG | SYSTOLIC BLOOD PRESSURE: 136 MMHG

## 2018-01-25 VITALS — DIASTOLIC BLOOD PRESSURE: 70 MMHG | SYSTOLIC BLOOD PRESSURE: 122 MMHG

## 2018-01-25 VITALS — SYSTOLIC BLOOD PRESSURE: 119 MMHG | DIASTOLIC BLOOD PRESSURE: 66 MMHG

## 2018-01-25 VITALS — SYSTOLIC BLOOD PRESSURE: 130 MMHG | DIASTOLIC BLOOD PRESSURE: 77 MMHG

## 2018-01-25 VITALS — DIASTOLIC BLOOD PRESSURE: 82 MMHG | SYSTOLIC BLOOD PRESSURE: 119 MMHG

## 2018-01-25 LAB
ADD MANUAL DIFF: YES
ALBUMIN SERPL-MCNC: 2.8 G/DL (ref 3.4–5)
ALBUMIN/GLOB SERPL: 0.7 {RATIO} (ref 1–2.7)
ALP SERPL-CCNC: 102 U/L (ref 46–116)
ALT SERPL-CCNC: 17 U/L (ref 12–78)
ANION GAP SERPL CALC-SCNC: 8 MMOL/L (ref 5–15)
AST SERPL-CCNC: 15 U/L (ref 15–37)
BILIRUB DIRECT SERPL-MCNC: 0.2 MG/DL (ref 0–0.3)
BILIRUB SERPL-MCNC: 1.3 MG/DL (ref 0.2–1)
BUN SERPL-MCNC: 43 MG/DL (ref 7–18)
CALCIUM SERPL-MCNC: 9.9 MG/DL (ref 8.5–10.1)
CHLORIDE SERPL-SCNC: 120 MMOL/L (ref 98–107)
CHOLEST SERPL-MCNC: 103 MG/DL (ref ?–200)
CO2 SERPL-SCNC: 29 MMOL/L (ref 21–32)
CREAT SERPL-MCNC: 1.1 MG/DL (ref 0.55–1.3)
ERYTHROCYTE [DISTWIDTH] IN BLOOD BY AUTOMATED COUNT: 12.1 % (ref 11.6–14.8)
GAMMA GLUTAMYL TRANSPEPTIDASE: 14 U/L (ref 5–85)
GLOBULIN SER-MCNC: 4.2 G/DL
HCT VFR BLD CALC: 50.3 % (ref 42–52)
HDLC SERPL-MCNC: 33 MG/DL (ref 40–60)
HGB BLD-MCNC: 16.4 G/DL (ref 14.2–18)
MCV RBC AUTO: 99 FL (ref 80–99)
PHOSPHATE SERPL-MCNC: 3 MG/DL (ref 2.5–4.9)
PLATELET # BLD: 84 K/UL (ref 150–450)
POTASSIUM SERPL-SCNC: 3.8 MMOL/L (ref 3.5–5.1)
RBC # BLD AUTO: 5.07 M/UL (ref 4.7–6.1)
SODIUM SERPL-SCNC: 157 MMOL/L (ref 136–145)
TRIGL SERPL-MCNC: 130 MG/DL (ref 30–150)
WBC # BLD AUTO: 9.8 K/UL (ref 4.8–10.8)

## 2018-01-25 RX ADMIN — SODIUM CHLORIDE SCH MLS/HR: 0.9 INJECTION INTRAVENOUS at 17:55

## 2018-01-25 RX ADMIN — ALBUTEROL SULFATE SCH MG: 2.5 SOLUTION RESPIRATORY (INHALATION) at 19:47

## 2018-01-25 RX ADMIN — SODIUM CHLORIDE SCH MLS/HR: 0.9 INJECTION INTRAVENOUS at 21:11

## 2018-01-25 RX ADMIN — INSULIN ASPART SCH UNITS: 100 INJECTION, SOLUTION INTRAVENOUS; SUBCUTANEOUS at 12:19

## 2018-01-25 RX ADMIN — REPAGLINIDE SCH MG: 1 TABLET ORAL at 21:12

## 2018-01-25 RX ADMIN — HEPARIN SODIUM SCH UNITS: 5000 INJECTION INTRAVENOUS; SUBCUTANEOUS at 08:47

## 2018-01-25 RX ADMIN — HEPARIN SODIUM SCH UNITS: 5000 INJECTION INTRAVENOUS; SUBCUTANEOUS at 21:00

## 2018-01-25 RX ADMIN — REPAGLINIDE SCH MG: 1 TABLET ORAL at 05:25

## 2018-01-25 RX ADMIN — METOPROLOL TARTRATE SCH MG: 25 TABLET, FILM COATED ORAL at 21:12

## 2018-01-25 RX ADMIN — ALBUTEROL SULFATE SCH MG: 2.5 SOLUTION RESPIRATORY (INHALATION) at 09:09

## 2018-01-25 RX ADMIN — INSULIN ASPART SCH UNITS: 100 INJECTION, SOLUTION INTRAVENOUS; SUBCUTANEOUS at 00:25

## 2018-01-25 RX ADMIN — ALBUTEROL SULFATE SCH MG: 2.5 SOLUTION RESPIRATORY (INHALATION) at 13:44

## 2018-01-25 RX ADMIN — METOCLOPRAMIDE HYDROCHLORIDE SCH MG: 5 SOLUTION ORAL at 11:25

## 2018-01-25 RX ADMIN — METOCLOPRAMIDE HYDROCHLORIDE SCH MG: 5 SOLUTION ORAL at 17:05

## 2018-01-25 RX ADMIN — METOPROLOL TARTRATE SCH MG: 25 TABLET, FILM COATED ORAL at 09:02

## 2018-01-25 RX ADMIN — Medication SCH TAB: at 21:12

## 2018-01-25 RX ADMIN — INSULIN ASPART SCH UNITS: 100 INJECTION, SOLUTION INTRAVENOUS; SUBCUTANEOUS at 17:28

## 2018-01-25 RX ADMIN — INSULIN ASPART SCH UNITS: 100 INJECTION, SOLUTION INTRAVENOUS; SUBCUTANEOUS at 05:31

## 2018-01-25 RX ADMIN — REPAGLINIDE SCH MG: 1 TABLET ORAL at 13:57

## 2018-01-25 RX ADMIN — METOCLOPRAMIDE HYDROCHLORIDE SCH MG: 5 SOLUTION ORAL at 00:24

## 2018-01-25 RX ADMIN — CIPROFLOXACIN HYDROCHLORIDE SCH DROP: 3 SOLUTION/ DROPS OPHTHALMIC at 17:06

## 2018-01-25 RX ADMIN — METOCLOPRAMIDE HYDROCHLORIDE SCH MG: 5 SOLUTION ORAL at 05:25

## 2018-01-25 RX ADMIN — ASPIRIN 81 MG SCH MG: 81 TABLET ORAL at 08:46

## 2018-01-25 NOTE — HISTORY AND PHYSICAL REPORT
DATE OF ADMISSION:  01/24/2018



HISTORY OF PRESENT ILLNESS:  The patient is 88-year-old resident of [facility], was transferred to emergency room here

at [facility] with complaint of feeling weak and tachycardia.

Also, the blood sugar in the nursing home was over 500.  After initial

evaluation in the emergency room, the patient is being admitted with a

diagnosis of hyperglycemia, out of control, evidence of urinary tract

infection, and dehydration.



PAST MEDICAL HISTORY:  Significant for dementia, previous percutaneous

endoscopic gastrostomy insertion, anemia, previous history of urinary

tract infection, history of diabetes mellitus, hypertension, and

osteoarthritis.



ALLERGIES:  The patient is not allergic to any medication.



PHYSICAL EXAMINATION:

VITAL SIGNS:  On examination today, the patient's temperature is 98.1,

remains tachycardic, blood pressure 119/66, and pulse oximetry is 93%.

HEENT:  Face slightly pale.  Eyes, conjunctivae injected with purulent

discharge.  Sclerae not icteric.  Throat, keeps the mouth closed, unable

to evaluate

NECK:  Rigid to all directions.

LUNGS:  Poor inspiratory effort.  Decreased breath sounds over the bases.

HEART:  Regular.  Tachycardic with occasional irregular beats.

ABDOMEN:  Soft.  GT tube in place.

EXTREMITIES:  Lower extremities, no edema.

CENTRAL NERVOUS SYSTEM:  Moves all extremities.



LABORATORY DATA:  Initially, the hematocrit 53.5, hemoglobin 17 with blood

sugar 526, creatinine 1.5, sodium 155, calcium 10.2, and albumin 3.2.  The

urine, 2+ protein, 40 white blood cells, and 10 RBCs.



IMPRESSION:  This is an 88-year-old male with multiple medical problems,

nursing home resident, currently being admitted with dehydration, urinary

tract infection, hyperglycemia, and conjunctivitis.



PLAN:  Plan to hydrate, monitor blood sugar, insulin coverage, antibiotics

in the form of Rocephin intravenous and ciprofloxacin eye drops, also

continue to monitor the blood sugar, hydrate, monitor renal parameters,

and according to how the patient's condition evolves, we will make the

proper changes in our future management.  GT feeding will also be

resumed.









  ______________________________________________

  Nish Schilling M.D. DR:  RADHA

D:  01/25/2018 17:43

T:  01/25/2018 18:46

JOB#:  [id number]

CC:

## 2018-01-25 NOTE — GENERAL PROGRESS NOTE
Assessment/Plan


Problem List:  


(1) UTI (urinary tract infection)


ICD Codes:  N39.0 - Urinary tract infection, site not specified


SNOMED:  11386740


Qualifiers:  


   


(2) Conjunctivitis of both eyes


ICD Codes:  H10.9 - Unspecified conjunctivitis


SNOMED:  0729231


Qualifiers:  


   Qualified Codes:  H10.023 - Other mucopurulent conjunctivitis, bilateral


(3) Hyperglycemia


ICD Codes:  R73.9 - Hyperglycemia, unspecified


SNOMED:  71196054


(4) Feeding by G-tube


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


SNOMED:  163777537, 852013914


(5) Dehydration


ICD Codes:  E86.0 - Dehydration


SNOMED:  28898034


Status:  unchanged


Assessment/Plan








HyperGlycemia, improving


Tachycardia


Dehydration, Hypernatremia


UTI


Conjunctivitis





Plan:





Antibiotics per ID 


BS control


Hydrate





Subjective


ROS Limited/Unobtainable:  No


Constitutional:  Reports: malaise


Allergies:  


Coded Allergies:  


     No Known Allergies (Unverified , 3/23/16)





Objective





Last 24 Hour Vital Signs








  Date Time  Temp Pulse Resp B/P (MAP) Pulse Ox O2 Delivery O2 Flow Rate FiO2


 


1/25/18 16:04 98.1 105 19 119/66 93   


 


1/25/18 13:53  96 18  99 Room Air  21


 


1/25/18 13:45  94 18  97 Room Air  21


 


1/25/18 12:00 97.7 95 20 122/70 96   


 


1/25/18 09:18  98 18  99 Room Air  21


 


1/25/18 09:10  100 18  99 Room Air  21


 


1/25/18 09:02  97  119/82    


 


1/25/18 08:29 97.9 97 18 119/82 94   


 


1/25/18 00:00 97.9 95 19 136/84 95   


 


1/24/18 21:34  114  129/62    


 


1/24/18 20:42  114 18  97 Room Air  21


 


1/24/18 20:38  112 20  95 Room Air 2.0 21


 


1/24/18 20:38        21


 


1/24/18 20:37  112 20   Room Air  21


 


1/24/18 20:00 98.1 111 19 129/62 96   


 


1/24/18 18:35 99.4 117 17 132/78 97 Room Air 2.0 


 


1/24/18 17:56  117 17 132/78 97 Room Air  

















Intake and Output  


 


 1/24/18 1/25/18





 19:00 07:00


 


Intake Total 1280 ml 1100 ml


 


Output Total  1200 ml


 


Balance 1280 ml -100 ml


 


  


 


Intake Oral 0 ml 


 


Free Water  180 ml


 


IV Total 1280 ml 750 ml


 


Tube Feeding  170 ml


 


Output Urine Total  1200 ml











Current Medications








 Medications


  (Trade)  Dose


 Ordered  Sig/Shasta


 Route


 PRN Reason  Start Time


 Stop Time Status Last Admin


Dose Admin


 


 Acetaminophen


  (Tylenol)  650 mg  Q6H  PRN


 GT


 Mild Pain/Temp > 100.5  1/24/18 16:00


 2/23/18 15:59   


 


 


 Albuterol Sulfate


  (Proventil)  2.5 mg  TIDRT


 HHN


   1/24/18 19:00


 1/29/18 18:59  1/25/18 13:44


 


 


 Aspirin


  (ASA)  81 mg  DAILY


 GT


   1/25/18 09:00


 2/24/18 08:59  1/25/18 08:46


 


 


 Ceftriaxone


 Sodium 1 gm/


 Sodium Chloride  55 ml @ 


 110 mls/hr  DAILY@1800


 IVPB


   1/24/18 16:15


 1/31/18 16:14  1/24/18 17:19


 


 


 Ciprofloxacin


  (Ciloxan Opth


 Soln)  1 drop  Q6HR


 BOTH EYES


   1/25/18 18:00


 2/1/18 17:59   


 


 


 Dextrose  1,000 ml @ 


 75 mls/hr  [id number]


 IV


   1/25/18 17:00


 2/24/18 16:59   


 


 


 Dextrose


  (Dextrose 50%)    STAT  PRN


 IV


 Hypoglycemia  1/24/18 20:00


 2/23/18 19:59   


 


 


 Fluconazole/


 Sodium Chloride  100 ml @ 


 100 mls/hr  Q24H


 IV


   1/25/18 16:00


 2/1/18 15:59   


 


 


 Heparin Sodium


  (Porcine)


  (Heparin 5000


 units/ml)  5,000 units  EVERY 12  HOURS


 SUBQ


   1/24/18 21:00


 2/23/18 20:59   


 


 


 Insulin Aspart


  (NovoLOG)    Q6HR


 SUBQ


   1/25/18 00:00


 2/24/18 00:00  1/25/18 12:19


 


 


 Insulin Detemir


  (Levemir)  20 units  ONCE  ONCE


 SUBQ


   1/25/18 16:45


 1/25/18 16:46 UNV  


 


 


 Metoclopramide HCl


  (Reglan)  5 mg  EVERY 6  HOURS


 GT


   1/24/18 18:00


 2/23/18 17:59  1/25/18 11:25


 


 


 Metoprolol


 Tartrate


  (Lopressor)  12.5 mg  EVERY 12  HOURS


 GT


   1/24/18 21:00


 2/23/18 20:59  1/25/18 09:02


 


 


 Repaglinide


  (Prandin)  2 mg  Q8HR


 GT


   1/24/18 22:00


 2/23/18 21:59  1/25/18 13:57


 


 


 Sennosides


  (Senokot)  1 tab  QHS


 GT


   1/24/18 21:00


 2/23/18 20:59  1/24/18 21:34


 


 


 Sitagliptin


 Phosphate


  (Januvia)  100 mg  DAILY


 GT


   1/25/18 09:00


 2/24/18 08:59  1/25/18 08:46


 





Laboratory Tests


1/25/18 05:50: 


White Blood Count 9.8, Red Blood Count 5.07, Hemoglobin 16.4, Hematocrit 50.3, 

Mean Corpuscular Volume 99, Mean Corpuscular Hemoglobin 32.4H, Mean Corpuscular 

Hemoglobin Concent 32.7, Red Cell Distribution Width 12.1, Platelet Count 84L, 

Mean Platelet Volume 8.8, Neutrophils (%) (Auto) , Lymphocytes (%) (Auto) , 

Monocytes (%) (Auto) , Eosinophils (%) (Auto) , Basophils (%) (Auto) , 

Differential Total Cells Counted 100, Neutrophils % (Manual) 78H, Lymphocytes % 

(Manual) 12L, Monocytes % (Manual) 9, Eosinophils % (Manual) 0, Basophils % (

Manual) 1, Band Neutrophils 0, Platelet Estimate DecreasedL, Platelet 

Morphology Normal, Anisocytosis 1+, Sodium Level 157H, Potassium Level 3.8, 

Chloride Level 120H, Carbon Dioxide Level 29, Anion Gap 8, Blood Urea Nitrogen 

43H, Creatinine 1.1, Estimat Glomerular Filtration Rate , Glucose Level 329#H, 

Hemoglobin A1c 9.5H, Uric Acid 6.7, Calcium Level 9.9, Phosphorus Level 3.0, 

Magnesium Level 2.3, Total Bilirubin 1.3H, Direct Bilirubin 0.2, Gamma Glutamyl 

Transpeptidase 14, Aspartate Amino Transf (AST/SGOT) 15, Alanine 

Aminotransferase (ALT/SGPT) 17, Alkaline Phosphatase 102, Troponin I 0.011, Pro-

B-Type Natriuretic Peptide 157H, Total Protein 7.0, Albumin 2.8L, Globulin 4.2, 

Albumin/Globulin Ratio 0.7L, Triglycerides Level 130, Cholesterol Level 103, 

LDL Cholesterol 72, HDL Cholesterol 33L, Cholesterol/HDL Ratio 3.1L, Vitamin 

B12 Level 784, Folate 19.4, Thyroid Stimulating Hormone (TSH) 1.394


Height (Feet):  6


Height (Inches):  3.00


Weight (Pounds):  220


General Appearance:  no apparent distress, confused


EENT:  other - purulent conjunctivitis


Neck:  limited range of motion


Cardiovascular:  tachycardia


Respiratory/Chest:  decreased breath sounds


Abdomen:  soft, other - GT+


Genitourinary/Rectal:  other - agarwal


Edema:  no edema noted Arm (L), no edema noted Arm (R), no edema noted Leg (L), 

no edema noted Leg (R), no edema noted Pedal (L), no edema noted Pedal (R), no 

edema noted Generalized


Neurologic:  other - moves all exts











NAY CALDWELL Jan 25, 2018 16:36

## 2018-01-25 NOTE — CONSULTATION
DATE OF CONSULTATION:  01/25/2018



INFECTIOUS DISEASE CONSULTATION



CONSULTING PHYSICIAN:  Amira Gross M.D.



REQUESTING PHYSICIAN:  Nish Schilling M.D.



REASON FOR CONSULTATION:  Urinary tract infection and conjunctivitis.

Recommendation for antibiotics therapy.



HISTORY OF PRESENT ILLNESS:  The patient is an 88-year-old male with past

medical history of diabetes, hypertension, chronic obstructive pulmonary

disease, renal disease, dementia, and benign prostatic hypertrophy, who

was sent from nursing home due to hyperglycemia.  Sugar was found to be

more than 500.  He also was tachycardic.  The patient had extensive workup

in the emergency room, revealed evidence of a urinary tract infection and

he was started on IV ceftriaxone.  He also had evidence of sticky

secretions in both eyes suspicious of infection.  So, Infectious Disease

consultation was requested for further evaluation and management.  As of

note, the patient is demented, cannot provide any history at this point.

History was mainly obtained from the medical record.



PAST MEDICAL HISTORY:  Significant for diabetes, hypertension, chronic

obstructive pulmonary disease, chronic renal disease, dementia, and benign

prostatic hypertrophy.



PAST SURGICAL HISTORY:  He had a percutaneous endoscopic gastrostomy tube

placement.



MEDICATIONS:  He is on ceftriaxone.  For the rest of his medications,

please refer to MAR.



ALLERGIES:  He has no known drug allergy.



FAMILY HISTORY:  Unable to obtain.



SOCIAL HISTORY:  He is a nursing home resident.  No recent drugs, tobacco,

or alcohol.



REVIEW OF SYSTEMS:  Unable to obtain at this point.  The patient is

demented and cannot provide any history.



PHYSICAL EXAMINATION:

VITAL SIGNS:  Temperature 97.7, pulse 96, respirations 18, blood pressure

122/70, and saturation 99% on room air.

GENERAL:  An elderly male, lying in bed, awake, alert, and not in

distress.

HEENT:  Normocephalic and atraumatic.  Pupils are reactive to light

equally.  A sticky secretion, yellowish color on both eyelashes in both

eyes with mild conjunctival redness.  Moist oral mucosa.  No exudate or

thrush.

NECK:  Supple.  No lymphadenopathy.

CARDIOVASCULAR:  Regular rate and rhythm.  No murmur or gallop.

LUNGS:  Clear bilaterally.  Diminished breathing sounds at the bases.  No

wheezing or rhonchi.

ABDOMEN:  Soft, nontender, and nondistended.  Positive bowel sounds.  No

hepatosplenomegaly.  No ascites.

EXTREMITIES:  No edema or cyanosis.

SKIN:  He had sacral pressure wound stage I.



LABORATORY DATA:  Labs showed white count of 9.8, hemoglobin of 16.4, and

platelet count of 84,000.  BUN of 43 and creatinine of 1.1.  AST of 15 and

ALT of 17.  Urinalysis showed +3 leukocyte esterase, WBC 30 to 40, and

moderate amount of yeast.



MICROBIOLOGY:  Urine culture is pending.



IMAGING:  Chest x-ray is normal.  No active infiltrate



ASSESSMENT AND RECOMMENDATION:

1. Urinary tract infection.  I agree on ceftriaxone empiric coverage for

now, pending culture results.

2. Candiduria.  We will start the patient on fluconazole, pending urine

culture results.

3. Acute conjunctivitis of both eyes most likely bacterial with thick

secretion.  We will start ciprofloxacin eyedrops for seven days.

4. Diabetes mellitus.  Recommend tight glycemic control to keep blood

glucose between 100 to 140.

5. Acute kidney injury, suspect dehydration.  Continue intravenous fluid

for hydration.  Monitor urine output.

6. Hyperglycemia.  Continue insulin and monitor blood glucose closely.



Thank you for the consult.  ID will continue to follow.









  ______________________________________________

  Amira Gross M.D.





DR:  CRISTHIAN

D:  01/25/2018 15:34

T:  01/25/2018 17:28

JOB#:  [id number]

CC:

## 2018-01-25 NOTE — INFECTIOUS DISEASES PROG NOTE
Assessment/Plan


Problems:  


(1) UTI (urinary tract infection)


Assessment & Plan:  continue ceftriaxon empiric coverage, pending urine culture 





(2) Candiduria


Assessment & Plan:  will start fluconazol pending urine culture 





(3) Conjunctivitis of both eyes


Assessment & Plan:  most likely bacterial with sticky secretions, will start 

ciprofloxacin eye drop for 7 days 





(4) Diabetes mellitus


Assessment & Plan:  recommend tight glycemic control to keep blood glucose 

between 100-140





(5) CHIP (acute kidney injury)


Assessment & Plan:  suspect dehydration, continue IVF for hydration, monitor 

urine out put 





(6) Hyperglycemia


Assessment & Plan:  continue insulin and monitor blood glucose 








Subjective


Allergies:  


Coded Allergies:  


     No Known Allergies (Unverified , 3/23/16)





Objective


Vital Signs





Last 24 Hour Vital Signs








  Date Time  Temp Pulse Resp B/P (MAP) Pulse Ox O2 Delivery O2 Flow Rate FiO2


 


1/25/18 13:53  96 18  99 Room Air  21


 


1/25/18 13:45  94 18  97 Room Air  21


 


1/25/18 12:00 97.7 95 20 122/70 96   


 


1/25/18 09:18  98 18  99 Room Air  21


 


1/25/18 09:10  100 18  99 Room Air  21


 


1/25/18 09:02  97  119/82    


 


1/25/18 08:29 97.9 97 18 119/82 94   


 


1/25/18 00:00 97.9 95 19 136/84 95   


 


1/24/18 21:34  114  129/62    


 


1/24/18 20:42  114 18  97 Room Air  21


 


1/24/18 20:38  112 20  95 Room Air 2.0 21


 


1/24/18 20:38        21


 


1/24/18 20:37  112 20   Room Air  21


 


1/24/18 20:00 98.1 111 19 129/62 96   


 


1/24/18 18:35 99.4 117 17 132/78 97 Room Air 2.0 


 


1/24/18 17:56  117 17 132/78 97 Room Air  


 


1/24/18 16:30  120 20 129/71 99 Room Air  








Height (Feet):  6


Height (Inches):  3.00


Weight (Pounds):  220





Microbiology








 Date/Time


Source Procedure


Growth Status


 


 


 1/24/18 15:40


Urine,Clean Catch Urine Culture - Preliminary


NO GROWTH Resulted











Laboratory Tests








Test


  1/24/18


15:40 1/25/18


05:50


 


Urine Color Pale yellow   


 


Urine Appearance


  Slightly


cloudy 


 


 


Urine pH 6 (4.5-8.0)   


 


Urine Specific Gravity


  1.015


(1.005-1.035) 


 


 


Urine Protein


  2+ (NEGATIVE)


H 


 


 


Urine Glucose (UA)


  4+ (NEGATIVE)


H 


 


 


Urine Ketones


  2+ (NEGATIVE)


H 


 


 


Urine Occult Blood


  5+ (NEGATIVE)


H 


 


 


Urine Nitrite


  Negative


(NEGATIVE) 


 


 


Urine Bilirubin


  Negative


(NEGATIVE) 


 


 


Urine Urobilinogen


  Normal MG/DL


(0.0-1.0) 


 


 


Urine Leukocyte Esterase


  3+ (NEGATIVE)


H 


 


 


Urine RBC


  5-10 /HPF (0 -


0)  H 


 


 


Urine WBC


  30-40 /HPF (0


- 0)  H 


 


 


Urine Squamous Epithelial


Cells None /LPF


(NONE/OCC) 


 


 


Urine Bacteria


  Few /HPF


(NONE) 


 


 


Urine Yeast


  Moderate /HPF


(NONE)  H 


 


 


White Blood Count


  


  9.8 K/UL


(4.8-10.8)


 


Red Blood Count


  


  5.07 M/UL


(4.70-6.10)


 


Hemoglobin


  


  16.4 G/DL


(14.2-18.0)


 


Hematocrit


  


  50.3 %


(42.0-52.0)


 


Mean Corpuscular Volume  99 FL (80-99)  


 


Mean Corpuscular Hemoglobin


  


  32.4 PG


(27.0-31.0)  H


 


Mean Corpuscular Hemoglobin


Concent 


  32.7 G/DL


(32.0-36.0)


 


Red Cell Distribution Width


  


  12.1 %


(11.6-14.8)


 


Platelet Count


  


  84 K/UL


(150-450)  L


 


Mean Platelet Volume


  


  8.8 FL


(6.5-10.1)


 


Neutrophils (%) (Auto)


  


  % (45.0-75.0)


 


 


Lymphocytes (%) (Auto)


  


  % (20.0-45.0)


 


 


Monocytes (%) (Auto)   % (1.0-10.0)  


 


Eosinophils (%) (Auto)   % (0.0-3.0)  


 


Basophils (%) (Auto)   % (0.0-2.0)  


 


Differential Total Cells


Counted 


  100  


 


 


Neutrophils % (Manual)  78 % (45-75)  H


 


Lymphocytes % (Manual)  12 % (20-45)  L


 


Monocytes % (Manual)  9 % (1-10)  


 


Eosinophils % (Manual)  0 % (0-3)  


 


Basophils % (Manual)  1 % (0-2)  


 


Band Neutrophils  0 % (0-8)  


 


Platelet Estimate  Decreased  L


 


Platelet Morphology  Normal  


 


Anisocytosis  1+  


 


Sodium Level


  


  157 MMOL/L


(136-145)  H


 


Potassium Level


  


  3.8 MMOL/L


(3.5-5.1)


 


Chloride Level


  


  120 MMOL/L


()  H


 


Carbon Dioxide Level


  


  29 MMOL/L


(21-32)


 


Anion Gap


  


  8 mmol/L


(5-15)


 


Blood Urea Nitrogen


  


  43 mg/dL


(7-18)  H


 


Creatinine


  


  1.1 MG/DL


(0.55-1.30)


 


Estimat Glomerular Filtration


Rate 


   mL/min (>60)  


 


 


Glucose Level


  


  329 MG/DL


()  #H


 


Hemoglobin A1c


  


  9.5 %


(4.3-6.0)  H


 


Uric Acid


  


  6.7 MG/DL


(2.6-7.2)


 


Calcium Level


  


  9.9 MG/DL


(8.5-10.1)


 


Phosphorus Level


  


  3.0 MG/DL


(2.5-4.9)


 


Magnesium Level


  


  2.3 MG/DL


(1.8-2.4)


 


Total Bilirubin


  


  1.3 MG/DL


(0.2-1.0)  H


 


Direct Bilirubin


  


  0.2 MG/DL


(0.0-0.3)


 


Gamma Glutamyl Transpeptidase  14 U/L (5-85)  


 


Aspartate Amino Transf


(AST/SGOT) 


  15 U/L (15-37)


 


 


Alanine Aminotransferase


(ALT/SGPT) 


  17 U/L (12-78)


 


 


Alkaline Phosphatase


  


  102 U/L


()


 


Troponin I


  


  0.011 ng/mL


(0.000-0.056)


 


Pro-B-Type Natriuretic Peptide


  


  157 pg/mL


(0-125)  H


 


Total Protein


  


  7.0 G/DL


(6.4-8.2)


 


Albumin


  


  2.8 G/DL


(3.4-5.0)  L


 


Globulin  4.2 g/dL  


 


Albumin/Globulin Ratio


  


  0.7 (1.0-2.7)


L


 


Triglycerides Level


  


  130 MG/DL


()


 


Cholesterol Level


  


  103 MG/DL (<


200)


 


LDL Cholesterol


  


  72 mg/dL


(<100)


 


HDL Cholesterol


  


  33 MG/DL


(40-60)  L


 


Cholesterol/HDL Ratio


  


  3.1 (3.3-4.4)


L


 


Vitamin B12 Level


  


  784 PG/ML


(193-986)


 


Folate


  


  19.4 NG/ML


(8.6-58.9)


 


Thyroid Stimulating Hormone


(TSH) 


  1.394 uiU/mL


(0.358-3.740)











Current Medications








 Medications


  (Trade)  Dose


 Ordered  Sig/Shasta


 Route


 PRN Reason  Start Time


 Stop Time Status Last Admin


Dose Admin


 


 Acetaminophen


  (Tylenol)  650 mg  Q6H  PRN


 GT


 Mild Pain/Temp > 100.5  1/24/18 16:00


 2/23/18 15:59   


 


 


 Albuterol Sulfate


  (Proventil)  2.5 mg  TIDRT


 HHN


   1/24/18 19:00


 1/29/18 18:59  1/25/18 13:44


 


 


 Aspirin


  (ASA)  81 mg  DAILY


 GT


   1/25/18 09:00


 2/24/18 08:59  1/25/18 08:46


 


 


 Ceftriaxone


 Sodium 1 gm/


 Sodium Chloride  55 ml @ 


 110 mls/hr  DAILY@1800


 IVPB


   1/24/18 16:15


 1/31/18 16:14  1/24/18 17:19


 


 


 Dextrose


  (Dextrose 50%)    STAT  PRN


 IV


 Hypoglycemia  1/24/18 20:00


 2/23/18 19:59   


 


 


 Heparin Sodium


  (Porcine)


  (Heparin 5000


 units/ml)  5,000 units  EVERY 12  HOURS


 SUBQ


   1/24/18 21:00


 2/23/18 20:59   


 


 


 Insulin Aspart


  (NovoLOG)    Q6HR


 SUBQ


   1/25/18 00:00


 2/24/18 00:00  1/25/18 12:19


 


 


 Metoclopramide HCl


  (Reglan)  5 mg  EVERY 6  HOURS


 GT


   1/24/18 18:00


 2/23/18 17:59  1/25/18 11:25


 


 


 Metoprolol


 Tartrate


  (Lopressor)  12.5 mg  EVERY 12  HOURS


 GT


   1/24/18 21:00


 2/23/18 20:59  1/25/18 09:02


 


 


 Repaglinide


  (Prandin)  2 mg  Q8HR


 GT


   1/24/18 22:00


 2/23/18 21:59  1/25/18 13:57


 


 


 Sennosides


  (Senokot)  1 tab  QHS


 GT


   1/24/18 21:00


 2/23/18 20:59  1/24/18 21:34


 


 


 Sitagliptin


 Phosphate


  (Januvia)  100 mg  DAILY


 GT


   1/25/18 09:00


 2/24/18 08:59  1/25/18 08:46


 


 


 Sodium Chloride  1,000 ml @ 


 75 mls/hr  [id number]


 IV


   1/24/18 16:00


 2/23/18 15:59  1/25/18 05:24


 

















Amira Gross M.D. Jan 25, 2018 15:10

## 2018-01-26 VITALS — DIASTOLIC BLOOD PRESSURE: 80 MMHG | SYSTOLIC BLOOD PRESSURE: 140 MMHG

## 2018-01-26 VITALS — SYSTOLIC BLOOD PRESSURE: 123 MMHG | DIASTOLIC BLOOD PRESSURE: 71 MMHG

## 2018-01-26 VITALS — DIASTOLIC BLOOD PRESSURE: 73 MMHG | SYSTOLIC BLOOD PRESSURE: 123 MMHG

## 2018-01-26 VITALS — DIASTOLIC BLOOD PRESSURE: 71 MMHG | SYSTOLIC BLOOD PRESSURE: 124 MMHG

## 2018-01-26 VITALS — SYSTOLIC BLOOD PRESSURE: 132 MMHG | DIASTOLIC BLOOD PRESSURE: 75 MMHG

## 2018-01-26 VITALS — SYSTOLIC BLOOD PRESSURE: 114 MMHG | DIASTOLIC BLOOD PRESSURE: 65 MMHG

## 2018-01-26 VITALS — SYSTOLIC BLOOD PRESSURE: 117 MMHG | DIASTOLIC BLOOD PRESSURE: 69 MMHG

## 2018-01-26 LAB
APPEARANCE UR: CLEAR
APTT PPP: (no result) S
GLUCOSE UR STRIP-MCNC: (no result) MG/DL
KETONES UR QL STRIP: NEGATIVE
LEUKOCYTE ESTERASE UR QL STRIP: (no result)
NITRITE UR QL STRIP: NEGATIVE
PH UR STRIP: 5 [PH] (ref 4.5–8)
PROT UR QL STRIP: (no result)
SP GR UR STRIP: 1.01 (ref 1–1.03)
UROBILINOGEN UR-MCNC: NORMAL MG/DL (ref 0–1)

## 2018-01-26 RX ADMIN — SODIUM CHLORIDE SCH MLS/HR: 0.9 INJECTION INTRAVENOUS at 17:04

## 2018-01-26 RX ADMIN — INSULIN ASPART SCH UNITS: 100 INJECTION, SOLUTION INTRAVENOUS; SUBCUTANEOUS at 12:11

## 2018-01-26 RX ADMIN — REPAGLINIDE SCH MG: 1 TABLET ORAL at 06:00

## 2018-01-26 RX ADMIN — METOPROLOL TARTRATE SCH MG: 25 TABLET, FILM COATED ORAL at 08:36

## 2018-01-26 RX ADMIN — METOCLOPRAMIDE HYDROCHLORIDE SCH MG: 5 SOLUTION ORAL at 23:21

## 2018-01-26 RX ADMIN — Medication SCH TAB: at 20:41

## 2018-01-26 RX ADMIN — ALBUTEROL SULFATE SCH MG: 2.5 SOLUTION RESPIRATORY (INHALATION) at 06:46

## 2018-01-26 RX ADMIN — METOCLOPRAMIDE HYDROCHLORIDE SCH MG: 5 SOLUTION ORAL at 00:16

## 2018-01-26 RX ADMIN — METOCLOPRAMIDE HYDROCHLORIDE SCH MG: 5 SOLUTION ORAL at 11:16

## 2018-01-26 RX ADMIN — HEPARIN SODIUM SCH UNITS: 5000 INJECTION INTRAVENOUS; SUBCUTANEOUS at 08:37

## 2018-01-26 RX ADMIN — CIPROFLOXACIN HYDROCHLORIDE SCH DROP: 3 SOLUTION/ DROPS OPHTHALMIC at 17:04

## 2018-01-26 RX ADMIN — CIPROFLOXACIN HYDROCHLORIDE SCH DROP: 3 SOLUTION/ DROPS OPHTHALMIC at 11:16

## 2018-01-26 RX ADMIN — INSULIN ASPART SCH UNITS: 100 INJECTION, SOLUTION INTRAVENOUS; SUBCUTANEOUS at 23:25

## 2018-01-26 RX ADMIN — INSULIN ASPART SCH UNITS: 100 INJECTION, SOLUTION INTRAVENOUS; SUBCUTANEOUS at 17:29

## 2018-01-26 RX ADMIN — ALBUTEROL SULFATE SCH MG: 2.5 SOLUTION RESPIRATORY (INHALATION) at 19:57

## 2018-01-26 RX ADMIN — ASPIRIN 81 MG SCH MG: 81 TABLET ORAL at 08:37

## 2018-01-26 RX ADMIN — INSULIN DETEMIR SCH UNITS: 100 INJECTION, SOLUTION SUBCUTANEOUS at 21:12

## 2018-01-26 RX ADMIN — METOCLOPRAMIDE HYDROCHLORIDE SCH MG: 5 SOLUTION ORAL at 05:59

## 2018-01-26 RX ADMIN — CIPROFLOXACIN HYDROCHLORIDE SCH DROP: 3 SOLUTION/ DROPS OPHTHALMIC at 23:21

## 2018-01-26 RX ADMIN — CIPROFLOXACIN HYDROCHLORIDE SCH DROP: 3 SOLUTION/ DROPS OPHTHALMIC at 00:17

## 2018-01-26 RX ADMIN — CIPROFLOXACIN HYDROCHLORIDE SCH DROP: 3 SOLUTION/ DROPS OPHTHALMIC at 06:00

## 2018-01-26 RX ADMIN — INSULIN ASPART SCH UNITS: 100 INJECTION, SOLUTION INTRAVENOUS; SUBCUTANEOUS at 00:18

## 2018-01-26 RX ADMIN — METOPROLOL TARTRATE SCH MG: 25 TABLET, FILM COATED ORAL at 20:41

## 2018-01-26 RX ADMIN — HEPARIN SODIUM SCH UNITS: 5000 INJECTION INTRAVENOUS; SUBCUTANEOUS at 20:42

## 2018-01-26 RX ADMIN — ALBUTEROL SULFATE SCH MG: 2.5 SOLUTION RESPIRATORY (INHALATION) at 13:12

## 2018-01-26 RX ADMIN — INSULIN ASPART SCH UNITS: 100 INJECTION, SOLUTION INTRAVENOUS; SUBCUTANEOUS at 06:01

## 2018-01-26 RX ADMIN — METOCLOPRAMIDE HYDROCHLORIDE SCH MG: 5 SOLUTION ORAL at 17:04

## 2018-01-26 NOTE — WOUND CARE CONSULTATION
Wound Assessment


Wound Assessment #1:  


   Wound Number:  1


   Wound Present on Admission:  Yes


   New Wound:  No


   Status Change of Wound:  No


   Wound Location Body Site Modif:  mid


   Wound Location Body Site:  coccyx


   Wound Type:  pressure ulcer


   Elpidio Test:  Does not Elpidio


   Pressure Ulcer Stage:  Unstageable


   Wound Thickness:  Full Thickness


   Wound Length:  3.5


   Wound Width:  4.5


   Wound Depth:  utd


   Percent of Wound Purple/Maroon:  100


   Wound Drainage Description:  [place]


   Wound Drainage Amount:  Scant


   Wound Drainage Odor:  None/Absent


   Tissue Surrounding Wound:  Macerated


   Wound General Appearance:  Reddened - purple/maroon, Draining


Wound Assessment #2:  


   Wound Number:  2


   Wound Present on Admission:  Yes


   New Wound:  No


   Status Change of Wound:  No


   Wound Location Body Site Modif:  left


   Wound Location Body Site:  heel


   Wound Type:  pressure ulcer


   Elpidio Test:  Does not Elpidio


   Pressure Ulcer Stage:  Deep Tissue Injury


   Wound Thickness:  Full Thickness


   Wound Length:  4.5


   Wound Width:  5.5


   Wound Depth:  utd


   Percent of Wound Purple/Maroon:  100


   Wound Drainage Amount:  None


   Wound Drainage Odor:  None/Absent


   Tissue Surrounding Wound:  Erythemic


   Wound General Appearance:  Reddened - purple


Wound Assessment #3:  


   Wound Number:  3


   Wound Present on Admission:  Yes


   New Wound:  No


   Status Change of Wound:  No


   Wound Location Body Site Modif:  right, lower, anterior


   Wound Location Body Site:  leg


   Wound Type:  scab - full thickness


   Elpidio Test:  Does not Elpidio


   Wound Thickness:  Full Thickness


   Wound Length:  0.8


   Wound Width:  0.8


   Percent of Wound Black/Brown:  100


   Wound Drainage Amount:  None


   Wound Drainage Odor:  None/Absent


   Tissue Surrounding Wound:  Erythemic


   Wound General Appearance:  Reddened - brown dry scab


Wound Assessment #4:  


   Wound Number:  4


   Wound Present on Admission:  Yes


   New Wound:  No


   Status Change of Wound:  No


   Wound Location Body Site:  perineal area


   Wound Type:  chemical burn


   Elpidio Test:  Does not Elpidio


   Percent of Wound Pink/Red:  100


   Wound Drainage Amount:  None


   Wound Drainage Odor:  None/Absent


   Tissue Surrounding Wound:  Erythemic


   Wound General Appearance:  Reddened


Wound Assessment #5:  


   Wound Number:  5


   Wound Present on Admission:  Yes


   New Wound:  No


   Status Change of Wound:  No


   Wound Location Body Site Modif:  mid


   Wound Location Body Site:  sacral


   Wound Type:  pressure ulcer


   Elpidio Test:  Does not Elpidio


   Pressure Ulcer Stage:  Deep Tissue Injury


   Wound Thickness:  Full Thickness


   Wound Length:  5.0


   Wound Width:  5.5


   Wound Depth:  utd


   Percent of Wound Purple/Maroon:  100


   Wound Drainage Amount:  None


   Wound Drainage Odor:  None/Absent


   Tissue Surrounding Wound:  Erythemic


   Wound General Appearance:  Reddened - purple/maroon


Wound Assessment #6:  


   Wound Number:  6


   Wound Present on Admission:  Yes


   New Wound:  No


   Status Change of Wound:  No


   Wound Location Body Site Modif:  right


   Wound Location Body Site:  heel


   Wound Type:  pressure ulcer


   Elpidio Test:  Does not Elpidio


   Pressure Ulcer Stage:  Deep Tissue Injury - SDTI


   Wound Thickness:  Full Thickness


   Wound Length:  4.5


   Wound Width:  4.5


   Wound Depth:  utd


   Percent of Wound Purple/Maroon:  100


   Wound Drainage Amount:  None


   Wound Drainage Odor:  None/Absent


   Tissue Surrounding Wound:  Intact


   Wound General Appearance:  Reddened - deep red


Wound Comment





#1 Sacral area DTI pressure ulcer


#2 Coccygeal area unstageable pressure ulcer


#3 Left heel DTI pressure ulcer


#4 Right heel SDTI pressure ulcer


#5 Perineal area chemical burn


#6 Right anterior lower leg dry scab





Recommendation





-Local wound care per protocol


-Keep clean and dry


-Optimize nutrition


-Offload both heels 


-Heel protector on both heels


-Turn and reposition


-Low air loss mattress


-Assess and f/u accordingly for any changes











RICH BRUNO RN Jan 26, 2018 15:10

## 2018-01-26 NOTE — INFECTIOUS DISEASES PROG NOTE
Assessment/Plan


Problems:  


(1) UTI (urinary tract infection)


Assessment & Plan:  continue ceftriaxon empiric coverage, pending urine culture 





(2) Candiduria


Assessment & Plan:  on fluconazol pending urine culture 





(3) Conjunctivitis of both eyes


Assessment & Plan:  most likely bacterial with sticky secretions, will start 

ciprofloxacin eye drop for 7 days 





(4) Diabetes mellitus


Assessment & Plan:  recommend tight glycemic control to keep blood glucose 

between 100-140





(5) CHIP (acute kidney injury)


Assessment & Plan:  suspect dehydration, continue IVF for hydration, monitor 

urine out put 





(6) Hyperglycemia


Assessment & Plan:  continue insulin and monitor blood glucose 








Subjective


Constitutional:  Reports: no symptoms


HEENT:  Reports: other - DRY SECRETIONS IN BOTH EYES


Respiratory:  Reports: no symptoms


Breasts:  Reports: no symptoms


Cardiovascular:  Reports: no symptoms


Gastrointestinal/Abdominal:  Reports: no symptoms


Genitourinary:  Reports: no symptoms


Neurologic:  Reports: no symptoms


Psychiatric:  Reports: no symptoms


Skin:  Reports: no symptoms


Endocrine:  Reports: no symptoms


Allergies:  


Coded Allergies:  


     No Known Allergies (Unverified , 3/23/16)





Objective


Vital Signs





Last 24 Hour Vital Signs








  Date Time  Temp Pulse Resp B/P (MAP) Pulse Ox O2 Delivery O2 Flow Rate FiO2


 


1/26/18 13:22  90 20  99 Room Air  21


 


1/26/18 13:12  88 18  96 Room Air  21


 


1/26/18 12:00 97.3 87 18 114/65 96   


 


1/26/18 08:36  103  140/80    


 


1/26/18 08:00 97.9 103 18 140/80 95   


 


1/26/18 06:56  97 20  99 Room Air  21


 


1/26/18 06:46  95 18  95 Room Air  21


 


1/26/18 04:00 98.9 98 18 132/75 94   


 


1/26/18 00:00 98.6 94 18 123/73 95   


 


1/25/18 21:12  103  130/77    


 


1/25/18 20:00 98.6 103 20 130/77 94   


 


1/25/18 19:35  106 20  99 Room Air  21


 


1/25/18 19:30  104 20  97 Room Air  21


 


1/25/18 16:04 98.1 105 19 119/66 93   








Height (Feet):  6


Height (Inches):  3.00


Weight (Pounds):  220


General Appearance:  WD/WN, no acute distress


HEENT:  normocephalic, atraumatic, anicteric, mucous membranes moist


Respiratory/Chest:  chest wall non-tender, lungs clear, normal breath sounds, 

no respiratory distress, no accessory muscle use


Cardiovascular:  normal peripheral pulses, normal rate, regular rhythm, no 

gallop/murmur, no JVD


Abdomen:  normal bowel sounds, soft, non tender, no organomegaly, non distended

, no mass, no scars


Extremities:  no cyanosis, no clubbing


Skin:  no rash, no lesions


Neurologic/Psychiatric:  alert, responsive





Microbiology








 Date/Time


Source Procedure


Growth Status


 


 


 1/24/18 14:05


Blood Blood Culture - Preliminary


NO GROWTH AFTER 24 HOURS Resulted


 


 1/24/18 13:55


Blood Blood Culture - Preliminary


NO GROWTH AFTER 24 HOURS Resulted


 


 1/24/18 19:30


Nasal Nares Left MRSA Culture - Final


Staphylococcus Aureus - Mrsa Complete


 


 1/24/18 15:40


Urine,Clean Catch Urine Culture - Preliminary Resulted


 


 1/24/18 19:30


Rectum VRE Culture - Final


NO VANCOMYCIN RESISTANT ENTEROCOCCUS ... Complete











Laboratory Tests








Test


  1/26/18


10:00


 


Urine Color Pale yellow  


 


Urine Appearance Clear  


 


Urine pH 5 (4.5-8.0)  


 


Urine Specific Gravity


  1.015


(1.005-1.035)


 


Urine Protein


  2+ (NEGATIVE)


H


 


Urine Glucose (UA)


  4+ (NEGATIVE)


H


 


Urine Ketones


  Negative


(NEGATIVE)


 


Urine Occult Blood


  5+ (NEGATIVE)


H


 


Urine Nitrite


  Negative


(NEGATIVE)


 


Urine Bilirubin


  Negative


(NEGATIVE)


 


Urine Urobilinogen


  Normal MG/DL


(0.0-1.0)


 


Urine Leukocyte Esterase


  3+ (NEGATIVE)


H


 


Urine RBC


  5-10 /HPF (0 -


0)  H


 


Urine WBC


  20-30 /HPF (0


- 0)  H


 


Urine Squamous Epithelial


Cells Occasional


/LPF


 


Urine Bacteria


  Occasional


/HPF (NONE)











Current Medications








 Medications


  (Trade)  Dose


 Ordered  Sig/Shasta


 Route


 PRN Reason  Start Time


 Stop Time Status Last Admin


Dose Admin


 


 Acetaminophen


  (Tylenol)  650 mg  Q6H  PRN


 GT


 Mild Pain/Temp > 100.5  1/24/18 16:00


 2/23/18 15:59   


 


 


 Albuterol Sulfate


  (Proventil)  2.5 mg  TIDRT


 HHN


   1/24/18 19:00


 1/29/18 18:59  1/26/18 13:12


 


 


 Aspirin


  (ASA)  81 mg  DAILY


 GT


   1/25/18 09:00


 2/24/18 08:59  1/26/18 08:37


 


 


 Ceftriaxone


 Sodium 1 gm/


 Sodium Chloride  55 ml @ 


 110 mls/hr  DAILY@1800


 IVPB


   1/24/18 16:15


 1/31/18 16:14  1/25/18 21:11


 


 


 Ciprofloxacin


  (Ciloxan Opth


 Soln)  1 drop  Q6HR


 BOTH EYES


   1/25/18 18:00


 2/1/18 17:59  1/26/18 11:16


 


 


 Dextrose  1,000 ml @ 


 75 mls/hr  [id number]


 IV


   1/25/18 17:00


 2/24/18 16:59  1/26/18 05:59


 


 


 Dextrose


  (Dextrose 50%)    STAT  PRN


 IV


 Hypoglycemia  1/24/18 20:00


 2/23/18 19:59   


 


 


 Fluconazole/


 Sodium Chloride  100 ml @ 


 100 mls/hr  Q24H


 IV


   1/25/18 16:00


 2/1/18 15:59  1/25/18 18:20


 


 


 Heparin Sodium


  (Porcine)


  (Heparin 5000


 units/ml)  5,000 units  EVERY 12  HOURS


 SUBQ


   1/24/18 21:00


 2/23/18 20:59   


 


 


 Insulin Aspart


  (NovoLOG)    Q6HR


 SUBQ


   1/25/18 00:00


 2/24/18 00:00  1/26/18 12:11


 


 


 Insulin Detemir


  (Levemir)  20 units  Q12HR


 SUBQ


   1/26/18 21:00


 2/25/18 20:59   


 


 


 Metoclopramide HCl


  (Reglan)  5 mg  EVERY 6  HOURS


 GT


   1/24/18 18:00


 2/23/18 17:59  1/26/18 11:16


 


 


 Metoprolol


 Tartrate


  (Lopressor)  25 mg  EVERY 12  HOURS


 GT


   1/26/18 21:00


 2/25/18 20:59   


 


 


 Sennosides


  (Senokot)  1 tab  QHS


 GT


   1/24/18 21:00


 2/23/18 20:59  1/25/18 21:12


 


 


 Sitagliptin


 Phosphate


  (Januvia)  100 mg  DAILY


 GT


   1/25/18 09:00


 2/24/18 08:59  1/26/18 08:36


 

















Amira Gross M.D. Jan 26, 2018 14:10

## 2018-01-26 NOTE — GENERAL PROGRESS NOTE
Assessment/Plan


Problem List:  


(1) UTI (urinary tract infection)


ICD Codes:  N39.0 - Urinary tract infection, site not specified


SNOMED:  06254896


Qualifiers:  


   


(2) Conjunctivitis of both eyes


ICD Codes:  H10.9 - Unspecified conjunctivitis


SNOMED:  4317547


Qualifiers:  


   Qualified Codes:  H10.023 - Other mucopurulent conjunctivitis, bilateral


(3) Hyperglycemia


ICD Codes:  R73.9 - Hyperglycemia, unspecified


SNOMED:  92825707


(4) Feeding by G-tube


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


SNOMED:  968392975, 663205765


(5) Dehydration


ICD Codes:  E86.0 - Dehydration


SNOMED:  81262684


Assessment/Plan





No labs today


HyperGlycemia, improving


Tachycardia


Dehydration, Hypernatremia


UTI


Conjunctivitis





Plan:





increase Lopressor-


Add Levemir 20 u Q12 h 


DC prandin





Antibiotics per ID 


BS control


Hydrate





Subjective


ROS Limited/Unobtainable:  No


Constitutional:  Reports: weakness


Allergies:  


Coded Allergies:  


     No Known Allergies (Unverified , 3/23/16)





Objective





Last 24 Hour Vital Signs








  Date Time  Temp Pulse Resp B/P (MAP) Pulse Ox O2 Delivery O2 Flow Rate FiO2


 


1/26/18 08:36  103  140/80    


 


1/26/18 08:00 97.9 103 18 140/80 95   


 


1/26/18 06:56  97 20  99 Room Air  21


 


1/26/18 06:46  95 18  95 Room Air  21


 


1/26/18 04:00 98.9 98 18 132/75 94   


 


1/26/18 00:00 98.6 94 18 123/73 95   


 


1/25/18 21:12  103  130/77    


 


1/25/18 20:00 98.6 103 20 130/77 94   


 


1/25/18 19:35  106 20  99 Room Air  21


 


1/25/18 19:30  104 20  97 Room Air  21


 


1/25/18 16:04 98.1 105 19 119/66 93   


 


1/25/18 13:53  96 18  99 Room Air  21


 


1/25/18 13:45  94 18  97 Room Air  21


 


1/25/18 12:00 97.7 95 20 122/70 96   

















Intake and Output  


 


 1/25/18 1/26/18





 19:00 07:00


 


Intake Total 550 ml 1050 ml


 


Output Total 500 ml 750 ml


 


Balance 50 ml 300 ml


 


  


 


Free Water 100 ml 50 ml


 


IV Total  680 ml


 


Tube Feeding 450 ml 320 ml


 


Output Urine Total 500 ml 750 ml








Height (Feet):  6


Height (Inches):  3.00


Weight (Pounds):  220


Neck:  non-tender


Cardiovascular:  tachycardia


Respiratory/Chest:  decreased breath sounds


Abdomen:  soft











NAY CALDWELL Jan 26, 2018 09:35

## 2018-01-27 VITALS — SYSTOLIC BLOOD PRESSURE: 100 MMHG | DIASTOLIC BLOOD PRESSURE: 56 MMHG

## 2018-01-27 VITALS — SYSTOLIC BLOOD PRESSURE: 119 MMHG | DIASTOLIC BLOOD PRESSURE: 67 MMHG

## 2018-01-27 VITALS — SYSTOLIC BLOOD PRESSURE: 102 MMHG | DIASTOLIC BLOOD PRESSURE: 57 MMHG

## 2018-01-27 VITALS — SYSTOLIC BLOOD PRESSURE: 94 MMHG | DIASTOLIC BLOOD PRESSURE: 66 MMHG

## 2018-01-27 VITALS — DIASTOLIC BLOOD PRESSURE: 63 MMHG | SYSTOLIC BLOOD PRESSURE: 109 MMHG

## 2018-01-27 LAB
ADD MANUAL DIFF: YES
ALBUMIN SERPL-MCNC: 2.7 G/DL (ref 3.4–5)
ALBUMIN/GLOB SERPL: 0.6 {RATIO} (ref 1–2.7)
ALP SERPL-CCNC: 115 U/L (ref 46–116)
ALT SERPL-CCNC: 30 U/L (ref 12–78)
ANION GAP SERPL CALC-SCNC: 5 MMOL/L (ref 5–15)
AST SERPL-CCNC: 22 U/L (ref 15–37)
BILIRUB SERPL-MCNC: 1 MG/DL (ref 0.2–1)
BUN SERPL-MCNC: 30 MG/DL (ref 7–18)
CALCIUM SERPL-MCNC: 10 MG/DL (ref 8.5–10.1)
CHLORIDE SERPL-SCNC: 117 MMOL/L (ref 98–107)
CO2 SERPL-SCNC: 33 MMOL/L (ref 21–32)
CREAT SERPL-MCNC: 1.1 MG/DL (ref 0.55–1.3)
ERYTHROCYTE [DISTWIDTH] IN BLOOD BY AUTOMATED COUNT: 12.5 % (ref 11.6–14.8)
GLOBULIN SER-MCNC: 4.2 G/DL
HCT VFR BLD CALC: 51.2 % (ref 42–52)
HGB BLD-MCNC: 16.5 G/DL (ref 14.2–18)
MCV RBC AUTO: 101 FL (ref 80–99)
PHOSPHATE SERPL-MCNC: 3.8 MG/DL (ref 2.5–4.9)
PLATELET # BLD: 88 K/UL (ref 150–450)
POTASSIUM SERPL-SCNC: 3.9 MMOL/L (ref 3.5–5.1)
RBC # BLD AUTO: 5.07 M/UL (ref 4.7–6.1)
SODIUM SERPL-SCNC: 155 MMOL/L (ref 136–145)
WBC # BLD AUTO: 9.1 K/UL (ref 4.8–10.8)

## 2018-01-27 RX ADMIN — ASPIRIN 81 MG SCH MG: 81 TABLET ORAL at 09:41

## 2018-01-27 RX ADMIN — HEPARIN SODIUM SCH UNITS: 5000 INJECTION INTRAVENOUS; SUBCUTANEOUS at 21:00

## 2018-01-27 RX ADMIN — CIPROFLOXACIN HYDROCHLORIDE SCH DROP: 3 SOLUTION/ DROPS OPHTHALMIC at 12:26

## 2018-01-27 RX ADMIN — METOCLOPRAMIDE HYDROCHLORIDE SCH MG: 5 SOLUTION ORAL at 05:28

## 2018-01-27 RX ADMIN — CIPROFLOXACIN HYDROCHLORIDE SCH DROP: 3 SOLUTION/ DROPS OPHTHALMIC at 05:28

## 2018-01-27 RX ADMIN — METOCLOPRAMIDE HYDROCHLORIDE SCH MG: 5 SOLUTION ORAL at 15:51

## 2018-01-27 RX ADMIN — INSULIN ASPART SCH UNITS: 100 INJECTION, SOLUTION INTRAVENOUS; SUBCUTANEOUS at 05:30

## 2018-01-27 RX ADMIN — ALBUTEROL SULFATE SCH MG: 2.5 SOLUTION RESPIRATORY (INHALATION) at 12:54

## 2018-01-27 RX ADMIN — INSULIN DETEMIR SCH UNITS: 100 INJECTION, SOLUTION SUBCUTANEOUS at 09:57

## 2018-01-27 RX ADMIN — METOCLOPRAMIDE HYDROCHLORIDE SCH MG: 5 SOLUTION ORAL at 22:28

## 2018-01-27 RX ADMIN — INSULIN ASPART SCH UNITS: 100 INJECTION, SOLUTION INTRAVENOUS; SUBCUTANEOUS at 19:20

## 2018-01-27 RX ADMIN — CIPROFLOXACIN HYDROCHLORIDE SCH DROP: 3 SOLUTION/ DROPS OPHTHALMIC at 19:17

## 2018-01-27 RX ADMIN — ALBUTEROL SULFATE SCH MG: 2.5 SOLUTION RESPIRATORY (INHALATION) at 19:57

## 2018-01-27 RX ADMIN — METOPROLOL TARTRATE SCH MG: 25 TABLET, FILM COATED ORAL at 09:00

## 2018-01-27 RX ADMIN — METOPROLOL TARTRATE SCH MG: 25 TABLET, FILM COATED ORAL at 21:00

## 2018-01-27 RX ADMIN — HEPARIN SODIUM SCH UNITS: 5000 INJECTION INTRAVENOUS; SUBCUTANEOUS at 09:00

## 2018-01-27 RX ADMIN — Medication SCH TAB: at 22:29

## 2018-01-27 RX ADMIN — SODIUM CHLORIDE SCH MLS/HR: 0.9 INJECTION INTRAVENOUS at 22:28

## 2018-01-27 RX ADMIN — INSULIN DETEMIR SCH UNITS: 100 INJECTION, SOLUTION SUBCUTANEOUS at 22:27

## 2018-01-27 RX ADMIN — ALBUTEROL SULFATE SCH MG: 2.5 SOLUTION RESPIRATORY (INHALATION) at 07:53

## 2018-01-27 RX ADMIN — INSULIN ASPART SCH UNITS: 100 INJECTION, SOLUTION INTRAVENOUS; SUBCUTANEOUS at 12:27

## 2018-01-27 NOTE — INFECTIOUS DISEASES PROG NOTE
Assessment/Plan


Problems:  


(1) UTI (urinary tract infection)


Assessment & Plan:  continue ceftriaxon empiric coverage, pending urine culture 





(2) Candiduria


Assessment & Plan:  on fluconazol pending urine culture 





(3) Conjunctivitis of both eyes


Assessment & Plan:  most likely bacterial with sticky secretions, will start 

ciprofloxacin eye drop for 7 days 





(4) Diabetes mellitus


Assessment & Plan:  recommend tight glycemic control to keep blood glucose 

between 100-140





(5) CHIP (acute kidney injury)


Assessment & Plan:  suspect dehydration, continue IVF for hydration, monitor 

urine out put 





(6) Hyperglycemia


Assessment & Plan:  continue insulin and monitor blood glucose 





(7) Wound of sacral region


Assessment & Plan:  with mild cellulitis. continue off loading and local wound 

care as per wound care team 








Subjective


Constitutional:  Reports: no symptoms


HEENT:  Reports: other - sticky secretions


Respiratory:  Reports: no symptoms


Breasts:  Reports: no symptoms


Cardiovascular:  Reports: no symptoms


Gastrointestinal/Abdominal:  Reports: no symptoms


Genitourinary:  Reports: no symptoms


Neurologic:  Reports: no symptoms


Psychiatric:  Reports: no symptoms


Skin:  Reports: no symptoms


Endocrine:  Reports: no symptoms


Hematologic:  Reports: no symptoms


Musculoskeletal:  Reports: pain


Allergies:  


Coded Allergies:  


     No Known Allergies (Unverified , 3/23/16)





Objective


Vital Signs





Last 24 Hour Vital Signs








  Date Time  Temp Pulse Resp B/P (MAP) Pulse Ox O2 Delivery O2 Flow Rate FiO2


 


1/27/18 12:54  95 18  96 Room Air  21 1/27/18 12:05  96 18  98 Room Air  21 1/27/18 11:28 98.2 95 20 102/57 99   


 


1/27/18 09:00  104  94/66    


 


1/27/18 08:16 97.2 104 20 94/66 98   


 


1/27/18 08:05  96 20  98 Room Air  21


 


1/27/18 07:53  94 18  97 Room Air  21 1/27/18 03:28 97.0 85 20 119/67 98 Room Air  


 


1/26/18 23:31 97.5 84 20 117/69 95 Room Air  


 


1/26/18 20:41  96  123/71    


 


1/26/18 20:05  96 20  98 Room Air  21


 


1/26/18 19:55  95 16  95 Room Air  21 1/26/18 19:41 98.1 98 20 123/71 97 Room Air  


 


1/26/18 16:33 97.5 97 19 124/71 97   








Height (Feet):  6


Height (Inches):  3.00


Weight (Pounds):  220


General Appearance:  WD/WN, no acute distress


HEENT:  normocephalic, atraumatic, anicteric, mucous membranes moist


Respiratory/Chest:  chest wall non-tender, lungs clear, normal breath sounds, 

no respiratory distress, no accessory muscle use


Cardiovascular:  normal peripheral pulses, normal rate, regular rhythm, no 

gallop/murmur, no JVD


Abdomen:  normal bowel sounds, soft, non tender, no organomegaly, non distended

, no mass, no scars


Extremities:  no cyanosis, no clubbing


Skin:  no rash, no lesions, ulcers - sacrum pressure wound stage I, with redness


Neurologic/Psychiatric:  alert, responsive





Microbiology








 Date/Time


Source Procedure


Growth Status


 


 


 1/24/18 19:30


Nasal Nares Left MRSA Culture - Final


Staphylococcus Aureus - Mrsa Complete


 


 1/26/18 04:00


Urine,Clean Catch Urine Culture - Preliminary Resulted


 


 1/24/18 19:30


Rectum VRE Culture - Final


NO VANCOMYCIN RESISTANT ENTEROCOCCUS ... Complete











Laboratory Tests








Test


  1/27/18


09:15


 


White Blood Count


  9.1 K/UL


(4.8-10.8)


 


Red Blood Count


  5.07 M/UL


(4.70-6.10)


 


Hemoglobin


  16.5 G/DL


(14.2-18.0)


 


Hematocrit


  51.2 %


(42.0-52.0)


 


Mean Corpuscular Volume


  101 FL (80-99)


H


 


Mean Corpuscular Hemoglobin


  32.5 PG


(27.0-31.0)  H


 


Mean Corpuscular Hemoglobin


Concent 32.2 G/DL


(32.0-36.0)


 


Red Cell Distribution Width


  12.5 %


(11.6-14.8)


 


Platelet Count


  88 K/UL


(150-450)  L


 


Mean Platelet Volume


  8.1 FL


(6.5-10.1)


 


Neutrophils (%) (Auto)


  % (45.0-75.0)


 


 


Lymphocytes (%) (Auto)


  % (20.0-45.0)


 


 


Monocytes (%) (Auto)  % (1.0-10.0)  


 


Eosinophils (%) (Auto)  % (0.0-3.0)  


 


Basophils (%) (Auto)  % (0.0-2.0)  


 


Differential Total Cells


Counted 100  


 


 


Neutrophils % (Manual) 67 % (45-75)  


 


Lymphocytes % (Manual) 26 % (20-45)  


 


Monocytes % (Manual) 5 % (1-10)  


 


Eosinophils % (Manual) 2 % (0-3)  


 


Basophils % (Manual) 0 % (0-2)  


 


Band Neutrophils 0 % (0-8)  


 


Platelet Estimate Decreased  L


 


Platelet Morphology Normal  


 


Macrocytosis 1+  


 


Sodium Level


  155 MMOL/L


(136-145)  H


 


Potassium Level


  3.9 MMOL/L


(3.5-5.1)


 


Chloride Level


  117 MMOL/L


()  H


 


Carbon Dioxide Level


  33 MMOL/L


(21-32)  H


 


Anion Gap


  5 mmol/L


(5-15)


 


Blood Urea Nitrogen


  30 mg/dL


(7-18)  H


 


Creatinine


  1.1 MG/DL


(0.55-1.30)


 


Estimat Glomerular Filtration


Rate  mL/min (>60)  


 


 


Glucose Level


  348 MG/DL


()  H


 


Calcium Level


  10.0 MG/DL


(8.5-10.1)


 


Phosphorus Level


  3.8 MG/DL


(2.5-4.9)


 


Magnesium Level


  2.0 MG/DL


(1.8-2.4)


 


Total Bilirubin


  1.0 MG/DL


(0.2-1.0)


 


Aspartate Amino Transf


(AST/SGOT) 22 U/L (15-37)


 


 


Alanine Aminotransferase


(ALT/SGPT) 30 U/L (12-78)


 


 


Alkaline Phosphatase


  115 U/L


()


 


C-Reactive Protein,


Quantitative < 0.4 mg/dL


(0.00-0.90)


 


Total Protein


  6.9 G/DL


(6.4-8.2)


 


Albumin


  2.7 G/DL


(3.4-5.0)  L


 


Globulin 4.2 g/dL  


 


Albumin/Globulin Ratio


  0.6 (1.0-2.7)


L











Current Medications








 Medications


  (Trade)  Dose


 Ordered  Sig/Shasta


 Route


 PRN Reason  Start Time


 Stop Time Status Last Admin


Dose Admin


 


 Acetaminophen


  (Tylenol)  650 mg  Q6H  PRN


 GT


 Mild Pain/Temp > 100.5  1/24/18 16:00


 2/23/18 15:59   


 


 


 Albuterol Sulfate


  (Proventil)  2.5 mg  TIDRT


 HHN


   1/24/18 19:00


 1/29/18 18:59  1/27/18 12:54


 


 


 Aspirin


  (ASA)  81 mg  DAILY


 GT


   1/25/18 09:00


 2/24/18 08:59  1/27/18 09:41


 


 


 Ceftriaxone


 Sodium 1 gm/


 Sodium Chloride  55 ml @ 


 110 mls/hr  DAILY@1800


 IVPB


   1/24/18 16:15


 1/31/18 16:14  1/26/18 17:04


 


 


 Ciprofloxacin


  (Ciloxan Opth


 Soln)  1 drop  Q6HR


 BOTH EYES


   1/25/18 18:00


 2/1/18 17:59  1/27/18 12:26


 


 


 Dextrose  1,000 ml @ 


 75 mls/hr  [id number]


 IV


   1/25/18 17:00


 2/24/18 16:59  1/27/18 11:50


 


 


 Dextrose


  (Dextrose 50%)    STAT  PRN


 IV


 Hypoglycemia  1/24/18 20:00


 2/23/18 19:59   


 


 


 Fluconazole/


 Sodium Chloride  100 ml @ 


 100 mls/hr  Q24H


 IV


   1/25/18 16:00


 2/1/18 15:59  1/26/18 15:19


 


 


 Heparin Sodium


  (Porcine)


  (Heparin 5000


 units/ml)  5,000 units  EVERY 12  HOURS


 SUBQ


   1/24/18 21:00


 2/23/18 20:59   


 


 


 Insulin Aspart


  (NovoLOG)    Q6HR


 SUBQ


   1/25/18 00:00


 2/24/18 00:00  1/27/18 12:27


 


 


 Insulin Detemir


  (Levemir)  30 units  Q12HR


 SUBQ


   1/27/18 21:00


 2/26/18 20:59   


 


 


 Metoclopramide HCl


  (Reglan)  5 mg  EVERY 8  HOURS


 GT


   1/27/18 14:00


 2/23/18 17:59   


 


 


 Metoprolol


 Tartrate


  (Lopressor)  25 mg  EVERY 12  HOURS


 GT


   1/26/18 21:00


 2/25/18 20:59  1/26/18 20:41


 


 


 Sennosides


  (Senokot)  1 tab  QHS


 GT


   1/24/18 21:00


 2/23/18 20:59  1/26/18 20:41


 


 


 Sitagliptin


 Phosphate


  (Januvia)  100 mg  DAILY


 GT


   1/25/18 09:00


 2/24/18 08:59  1/27/18 09:41


 

















Amira Gross M.D. Jan 27, 2018 15:45

## 2018-01-27 NOTE — GENERAL PROGRESS NOTE
Assessment/Plan


Problem List:  


(1) UTI (urinary tract infection)


ICD Codes:  N39.0 - Urinary tract infection, site not specified


SNOMED:  74999945


Qualifiers:  


   


(2) Conjunctivitis of both eyes


ICD Codes:  H10.9 - Unspecified conjunctivitis


SNOMED:  3208503


Qualifiers:  


   Qualified Codes:  H10.023 - Other mucopurulent conjunctivitis, bilateral


(3) Hyperglycemia


ICD Codes:  R73.9 - Hyperglycemia, unspecified


SNOMED:  30905125


(4) Feeding by G-tube


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


SNOMED:  691416941, 281238358


(5) Dehydration


ICD Codes:  E86.0 - Dehydration


SNOMED:  63864939


(6) Functional quadriplegia


ICD Codes:  R53.2 - Functional quadriplegia


SNOMED:  [id number]


Status:  stable


Status Narrative


BS OOC


Assessment/Plan








HyperGlycemia, persists


Tachycardia


Dehydration, Hypernatremia persists


UTI


Conjunctivitis





Plan:





DC agarwal


increase Lopressor-


H2O via GT


increase Levemir 30 u Q12 h 





Antibiotics per ID 


BS control


Hydrate





Subjective


ROS Limited/Unobtainable:  No


Constitutional:  Reports: malaise


Allergies:  


Coded Allergies:  


     No Known Allergies (Unverified , 3/23/16)





Objective





Last 24 Hour Vital Signs








  Date Time  Temp Pulse Resp B/P (MAP) Pulse Ox O2 Delivery O2 Flow Rate FiO2


 


1/27/18 09:00  104  94/66    


 


1/27/18 08:16 97.2 104 20 94/66 98   


 


1/27/18 08:05  96 20  98 Room Air  21 1/27/18 07:53  94 18  97 Room Air  21 1/27/18 03:28 97.0 85 20 119/67 98 Room Air  


 


1/26/18 23:31 97.5 84 20 117/69 95 Room Air  


 


1/26/18 20:41  96  123/71    


 


1/26/18 20:05  96 20  98 Room Air  21


 


1/26/18 19:55  95 16  95 Room Air  21


 


1/26/18 19:41 98.1 98 20 123/71 97 Room Air  


 


1/26/18 16:33 97.5 97 19 124/71 97   


 


1/26/18 13:22  90 20  99 Room Air  21


 


1/26/18 13:12  88 18  96 Room Air  21


 


1/26/18 12:00 97.3 87 18 114/65 96   

















Intake and Output  


 


 1/26/18 1/27/18





 19:00 07:00


 


Intake Total 855 ml 1290 ml


 


Output Total 800 ml 


 


Balance 55 ml 1290 ml


 


  


 


Free Water 100 ml 100 ml


 


IV Total 355 ml 750 ml


 


Tube Feeding 400 ml 440 ml


 


Output Urine Total 800 ml 


 


# Voids  3


 


# Bowel Movements  1








Laboratory Tests


1/27/18 09:15: 


White Blood Count 9.1, Red Blood Count 5.07, Hemoglobin 16.5, Hematocrit 51.2, 

Mean Corpuscular Volume 101H, Mean Corpuscular Hemoglobin 32.5H, Mean 

Corpuscular Hemoglobin Concent 32.2, Red Cell Distribution Width 12.5, Platelet 

Count 88L, Mean Platelet Volume 8.1, Neutrophils (%) (Auto) , Lymphocytes (%) (

Auto) , Monocytes (%) (Auto) , Eosinophils (%) (Auto) , Basophils (%) (Auto) , 

Neutrophils % (Manual) [Pending], Lymphocytes % (Manual) [Pending], Platelet 

Estimate [Pending], Platelet Morphology [Pending], Sodium Level 155H, Potassium 

Level 3.9, Chloride Level 117H, Carbon Dioxide Level 33H, Anion Gap 5, Blood 

Urea Nitrogen 30H, Creatinine 1.1, Estimat Glomerular Filtration Rate , Glucose 

Level 348H, Calcium Level 10.0, Phosphorus Level 3.8, Magnesium Level 2.0, 

Total Bilirubin 1.0, Aspartate Amino Transf (AST/SGOT) 22, Alanine 

Aminotransferase (ALT/SGPT) 30, Alkaline Phosphatase 115, C-Reactive Protein, 

Quantitative 0.5, Total Protein 6.9, Albumin 2.7L, Globulin 4.2, Albumin/

Globulin Ratio 0.6L


Height (Feet):  6


Height (Inches):  3.00


Weight (Pounds):  220


General Appearance:  no apparent distress


Cardiovascular:  normal rate


Respiratory/Chest:  decreased breath sounds


Abdomen:  soft


Genitourinary/Rectal:  other - agarwal +


Edema:  no edema noted Arm (L), no edema noted Arm (R), no edema noted Leg (L), 

no edema noted Leg (R), no edema noted Pedal (L), no edema noted Pedal (R), no 

edema noted Generalized


Neurologic:  other - bed bound











NAY CALDWELL Jan 27, 2018 11:19

## 2018-01-28 VITALS — SYSTOLIC BLOOD PRESSURE: 108 MMHG | DIASTOLIC BLOOD PRESSURE: 56 MMHG

## 2018-01-28 VITALS — SYSTOLIC BLOOD PRESSURE: 125 MMHG | DIASTOLIC BLOOD PRESSURE: 68 MMHG

## 2018-01-28 VITALS — DIASTOLIC BLOOD PRESSURE: 62 MMHG | SYSTOLIC BLOOD PRESSURE: 104 MMHG

## 2018-01-28 VITALS — DIASTOLIC BLOOD PRESSURE: 65 MMHG | SYSTOLIC BLOOD PRESSURE: 115 MMHG

## 2018-01-28 VITALS — SYSTOLIC BLOOD PRESSURE: 113 MMHG | DIASTOLIC BLOOD PRESSURE: 67 MMHG

## 2018-01-28 LAB
ADD MANUAL DIFF: YES
ALBUMIN SERPL-MCNC: 2.3 G/DL (ref 3.4–5)
ALBUMIN/GLOB SERPL: 0.6 {RATIO} (ref 1–2.7)
ALP SERPL-CCNC: 102 U/L (ref 46–116)
ALT SERPL-CCNC: 19 U/L (ref 12–78)
ANION GAP SERPL CALC-SCNC: 8 MMOL/L (ref 5–15)
AST SERPL-CCNC: 17 U/L (ref 15–37)
BILIRUB SERPL-MCNC: 1 MG/DL (ref 0.2–1)
BUN SERPL-MCNC: 27 MG/DL (ref 7–18)
CALCIUM SERPL-MCNC: 9.3 MG/DL (ref 8.5–10.1)
CHLORIDE SERPL-SCNC: 116 MMOL/L (ref 98–107)
CO2 SERPL-SCNC: 29 MMOL/L (ref 21–32)
CREAT SERPL-MCNC: 0.9 MG/DL (ref 0.55–1.3)
ERYTHROCYTE [DISTWIDTH] IN BLOOD BY AUTOMATED COUNT: 11.9 % (ref 11.6–14.8)
GLOBULIN SER-MCNC: 3.7 G/DL
HCT VFR BLD CALC: 44.7 % (ref 42–52)
HGB BLD-MCNC: 14.6 G/DL (ref 14.2–18)
MCV RBC AUTO: 98 FL (ref 80–99)
PHOSPHATE SERPL-MCNC: 3.3 MG/DL (ref 2.5–4.9)
PLATELET # BLD: 72 K/UL (ref 150–450)
POTASSIUM SERPL-SCNC: 3.5 MMOL/L (ref 3.5–5.1)
RBC # BLD AUTO: 4.56 M/UL (ref 4.7–6.1)
SODIUM SERPL-SCNC: 153 MMOL/L (ref 136–145)
WBC # BLD AUTO: 6.8 K/UL (ref 4.8–10.8)

## 2018-01-28 RX ADMIN — ASPIRIN 81 MG SCH MG: 81 TABLET ORAL at 09:33

## 2018-01-28 RX ADMIN — CEPHALEXIN SCH MG: 500 TABLET ORAL at 18:14

## 2018-01-28 RX ADMIN — CIPROFLOXACIN HYDROCHLORIDE SCH DROP: 3 SOLUTION/ DROPS OPHTHALMIC at 06:13

## 2018-01-28 RX ADMIN — METOPROLOL TARTRATE SCH MG: 25 TABLET, FILM COATED ORAL at 09:00

## 2018-01-28 RX ADMIN — CEPHALEXIN SCH MG: 500 TABLET ORAL at 12:39

## 2018-01-28 RX ADMIN — INSULIN ASPART SCH UNITS: 100 INJECTION, SOLUTION INTRAVENOUS; SUBCUTANEOUS at 18:13

## 2018-01-28 RX ADMIN — ALBUTEROL SULFATE SCH MG: 2.5 SOLUTION RESPIRATORY (INHALATION) at 06:53

## 2018-01-28 RX ADMIN — INSULIN ASPART SCH UNITS: 100 INJECTION, SOLUTION INTRAVENOUS; SUBCUTANEOUS at 00:56

## 2018-01-28 RX ADMIN — CIPROFLOXACIN HYDROCHLORIDE SCH DROP: 3 SOLUTION/ DROPS OPHTHALMIC at 00:56

## 2018-01-28 RX ADMIN — ALBUTEROL SULFATE SCH MG: 2.5 SOLUTION RESPIRATORY (INHALATION) at 19:34

## 2018-01-28 RX ADMIN — INSULIN ASPART SCH UNITS: 100 INJECTION, SOLUTION INTRAVENOUS; SUBCUTANEOUS at 12:22

## 2018-01-28 RX ADMIN — METOCLOPRAMIDE HYDROCHLORIDE SCH MG: 5 SOLUTION ORAL at 16:08

## 2018-01-28 RX ADMIN — CIPROFLOXACIN HYDROCHLORIDE SCH DROP: 3 SOLUTION/ DROPS OPHTHALMIC at 12:29

## 2018-01-28 RX ADMIN — CIPROFLOXACIN HYDROCHLORIDE SCH DROP: 3 SOLUTION/ DROPS OPHTHALMIC at 18:08

## 2018-01-28 RX ADMIN — INSULIN ASPART SCH UNITS: 100 INJECTION, SOLUTION INTRAVENOUS; SUBCUTANEOUS at 06:17

## 2018-01-28 RX ADMIN — ALBUTEROL SULFATE SCH MG: 2.5 SOLUTION RESPIRATORY (INHALATION) at 12:22

## 2018-01-28 RX ADMIN — INSULIN DETEMIR SCH UNITS: 100 INJECTION, SOLUTION SUBCUTANEOUS at 09:46

## 2018-01-28 RX ADMIN — HEPARIN SODIUM SCH UNITS: 5000 INJECTION INTRAVENOUS; SUBCUTANEOUS at 09:00

## 2018-01-28 RX ADMIN — METOCLOPRAMIDE HYDROCHLORIDE SCH MG: 5 SOLUTION ORAL at 06:13

## 2018-01-28 NOTE — INFECTIOUS DISEASES PROG NOTE
Assessment/Plan


Problems:  


(1) UTI (urinary tract infection)


Assessment & Plan:  with yeast , small colony most likely contaminant will stop 

ceftriaxon empiric coverage, on keflex now 





(2) Candiduria


Assessment & Plan:  due to candida glabrata , usually resistant to  fluconazol 

, will stop, since small colony , most likely contaminant 





(3) Conjunctivitis of both eyes


Assessment & Plan:  most likely bacterial with sticky secretions, will start 

ciprofloxacin eye drop for 7 days 





(4) Diabetes mellitus


Assessment & Plan:  recommend tight glycemic control to keep blood glucose 

between 100-140





(5) CHIP (acute kidney injury)


Assessment & Plan:  suspect dehydration, continue IVF for hydration, monitor 

urine out put 





(6) Hyperglycemia


Assessment & Plan:  continue insulin and monitor blood glucose 





(7) Wound of sacral region


Assessment & Plan:  with mild cellulitis. continue off loading and local wound 

care as per wound care team 








Subjective


Constitutional:  Reports: no symptoms


HEENT:  Reports: no symptoms


Respiratory:  Reports: no symptoms


Breasts:  Reports: no symptoms


Cardiovascular:  Reports: no symptoms


Gastrointestinal/Abdominal:  Reports: no symptoms


Genitourinary:  Reports: no symptoms


Neurologic:  Reports: no symptoms


Psychiatric:  Reports: no symptoms


Skin:  Reports: no symptoms


Endocrine:  Reports: no symptoms


Hematologic:  Reports: no symptoms


Musculoskeletal:  Reports: no symptoms


Allergies:  


Coded Allergies:  


     No Known Allergies (Unverified , 3/23/16)





Objective


Vital Signs





Last 24 Hour Vital Signs








  Date Time  Temp Pulse Resp B/P (MAP) Pulse Ox O2 Delivery O2 Flow Rate FiO2


 


1/28/18 12:32  95 20  98 Room Air  21


 


1/28/18 12:22  96 20  96 Room Air  21 1/28/18 12:06 97.0 96 18 115/65 95 Room Air  


 


1/28/18 09:00  97  108/56    


 


1/28/18 08:47 97.3 97 20 108/56 96 Room Air  


 


1/28/18 07:01  94 18  98 Room Air  21


 


1/28/18 06:53  90 16  97 Room Air  21 1/28/18 04:00 97.7 93 18 125/68 97 Room Air  


 


1/28/18 00:00 97.9 99 21 113/67 97 Room Air  


 


1/27/18 21:00  93  100/56    


 


1/27/18 20:01 97.5 93 21 100/56 97 Room Air  


 


1/27/18 19:59  98 18  98 Room Air  21


 


1/27/18 19:57  88 18  96 Room Air  21


 


1/27/18 16:21 98.1 98 20 109/63 98   








Height (Feet):  6


Height (Inches):  3.00


Weight (Pounds):  220


General Appearance:  WD/WN, no acute distress


HEENT:  normocephalic, atraumatic, anicteric, mucous membranes moist, PERRL


Respiratory/Chest:  chest wall non-tender, lungs clear, normal breath sounds, 

no respiratory distress, no accessory muscle use


Cardiovascular:  normal peripheral pulses, normal rate, regular rhythm, no 

gallop/murmur, no JVD


Abdomen:  normal bowel sounds, soft, non tender, no organomegaly, non distended

, no mass, no scars


Genitourinary:  normal external genitalia


Extremities:  no cyanosis, no clubbing


Skin:  no rash, no lesions, ulcers


Neurologic/Psychiatric:  alert, responsive





Microbiology








 Date/Time


Source Procedure


Growth Status


 


 


 1/26/18 04:00


Urine,Clean Catch Urine Culture - Preliminary Resulted











Laboratory Tests








Test


  1/28/18


05:50


 


White Blood Count


  6.8 K/UL


(4.8-10.8)


 


Red Blood Count


  4.56 M/UL


(4.70-6.10)  L


 


Hemoglobin


  14.6 G/DL


(14.2-18.0)


 


Hematocrit


  44.7 %


(42.0-52.0)


 


Mean Corpuscular Volume 98 FL (80-99)  


 


Mean Corpuscular Hemoglobin


  32.0 PG


(27.0-31.0)  H


 


Mean Corpuscular Hemoglobin


Concent 32.6 G/DL


(32.0-36.0)


 


Red Cell Distribution Width


  11.9 %


(11.6-14.8)


 


Platelet Count


  72 K/UL


(150-450)  L


 


Mean Platelet Volume


  8.9 FL


(6.5-10.1)


 


Neutrophils (%) (Auto)


  % (45.0-75.0)


 


 


Lymphocytes (%) (Auto)


  % (20.0-45.0)


 


 


Monocytes (%) (Auto)  % (1.0-10.0)  


 


Eosinophils (%) (Auto)  % (0.0-3.0)  


 


Basophils (%) (Auto)  % (0.0-2.0)  


 


Differential Total Cells


Counted 100  


 


 


Neutrophils % (Manual) 68 % (45-75)  


 


Lymphocytes % (Manual) 20 % (20-45)  


 


Monocytes % (Manual) 10 % (1-10)  


 


Eosinophils % (Manual) 2 % (0-3)  


 


Basophils % (Manual) 0 % (0-2)  


 


Band Neutrophils 0 % (0-8)  


 


Platelet Estimate Decreased  L


 


Platelet Morphology Normal  


 


Red Blood Cell Morphology Normal  


 


Sodium Level


  153 MMOL/L


(136-145)  H


 


Potassium Level


  3.5 MMOL/L


(3.5-5.1)


 


Chloride Level


  116 MMOL/L


()  H


 


Carbon Dioxide Level


  29 MMOL/L


(21-32)


 


Anion Gap


  8 mmol/L


(5-15)


 


Blood Urea Nitrogen


  27 mg/dL


(7-18)  H


 


Creatinine


  0.9 MG/DL


(0.55-1.30)


 


Estimat Glomerular Filtration


Rate  mL/min (>60)  


 


 


Glucose Level


  182 MG/DL


()  #H


 


Calcium Level


  9.3 MG/DL


(8.5-10.1)


 


Phosphorus Level


  3.3 MG/DL


(2.5-4.9)


 


Magnesium Level


  1.9 MG/DL


(1.8-2.4)


 


Total Bilirubin


  1.0 MG/DL


(0.2-1.0)


 


Aspartate Amino Transf


(AST/SGOT) 17 U/L (15-37)


 


 


Alanine Aminotransferase


(ALT/SGPT) 19 U/L (12-78)


 


 


Alkaline Phosphatase


  102 U/L


()


 


Total Protein


  6.0 G/DL


(6.4-8.2)  L


 


Albumin


  2.3 G/DL


(3.4-5.0)  L


 


Globulin 3.7 g/dL  


 


Albumin/Globulin Ratio


  0.6 (1.0-2.7)


L


 


Cortisol AM Sample Pending  











Current Medications








 Medications


  (Trade)  Dose


 Ordered  Sig/Shasta


 Route


 PRN Reason  Start Time


 Stop Time Status Last Admin


Dose Admin


 


 Acetaminophen


  (Tylenol)  650 mg  Q6H  PRN


 GT


 Mild Pain/Temp > 100.5  1/24/18 16:00


 2/23/18 15:59   


 


 


 Albuterol Sulfate


  (Proventil)  2.5 mg  TIDRT


 HHN


   1/24/18 19:00


 1/29/18 18:59  1/28/18 12:22


 


 


 Aspirin


  (ASA)  81 mg  DAILY


 GT


   1/25/18 09:00


 2/24/18 08:59  1/28/18 09:33


 


 


 Ceftriaxone


 Sodium 1 gm/


 Sodium Chloride  55 ml @ 


 110 mls/hr  DAILY


 IVPB


   1/28/18 10:00


 2/4/18 09:59  1/28/18 10:57


 


 


 Cephalexin


  (Keflex)  500 mg  FOUR TIMES A  DAY


 GT


   1/28/18 13:00


 2/4/18 12:59  1/28/18 12:39


 


 


 Ciprofloxacin


  (Ciloxan Opth


 Soln)  1 drop  Q6HR


 BOTH EYES


   1/25/18 18:00


 2/1/18 17:59  1/28/18 12:29


 


 


 Dextrose  1,000 ml @ 


 75 mls/hr  [id number]


 IV


   1/25/18 17:00


 2/24/18 16:59  1/27/18 22:30


 


 


 Dextrose


  (Dextrose 50%)    STAT  PRN


 IV


 Hypoglycemia  1/24/18 20:00


 2/23/18 19:59   


 


 


 Fluconazole/


 Sodium Chloride  100 ml @ 


 100 mls/hr  Q24H


 IV


   1/28/18 11:00


 2/4/18 10:59  1/28/18 12:28


 


 


 Heparin Sodium


  (Porcine)


  (Heparin 5000


 units/ml)  5,000 units  EVERY 12  HOURS


 SUBQ


   1/24/18 21:00


 2/23/18 20:59   


 


 


 Insulin Aspart


  (NovoLOG)    Q6HR


 SUBQ


   1/25/18 00:00


 2/24/18 00:00  1/28/18 12:22


 


 


 Insulin Detemir


  (Levemir)  30 units  Q12HR


 SUBQ


   1/27/18 21:00


 2/26/18 20:59  1/28/18 09:46


 


 


 Metoclopramide HCl


  (Reglan)  5 mg  EVERY 8  HOURS


 GT


   1/27/18 14:00


 2/23/18 17:59  1/28/18 06:13


 


 


 Metoprolol


 Tartrate


  (Lopressor)  25 mg  EVERY 12  HOURS


 GT


   1/26/18 21:00


 2/25/18 20:59  1/28/18 09:00


 


 


 Sennosides


  (Senokot)  1 tab  QHS


 GT


   1/24/18 21:00


 2/23/18 20:59  1/27/18 22:29


 


 


 Sitagliptin


 Phosphate


  (Januvia)  100 mg  DAILY


 GT


   1/25/18 09:00


 2/24/18 08:59  1/28/18 09:34


 

















Amira Gross M.D. Jan 28, 2018 15:07

## 2018-01-28 NOTE — GENERAL PROGRESS NOTE
Assessment/Plan


Problem List:  


(1) UTI (urinary tract infection)


ICD Codes:  N39.0 - Urinary tract infection, site not specified


SNOMED:  41529556


Qualifiers:  


   


(2) Conjunctivitis of both eyes


ICD Codes:  H10.9 - Unspecified conjunctivitis


SNOMED:  9591747


Qualifiers:  


   Qualified Codes:  H10.023 - Other mucopurulent conjunctivitis, bilateral


(3) Hyperglycemia


ICD Codes:  R73.9 - Hyperglycemia, unspecified


SNOMED:  19785391


(4) Feeding by G-tube


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


SNOMED:  630105992, 403648545


(5) Dehydration


ICD Codes:  E86.0 - Dehydration


SNOMED:  05797434


(6) Functional quadriplegia


ICD Codes:  R53.2 - Functional quadriplegia


SNOMED:  784366360596565


Assessment/Plan





DC on Keflex , Fluconozol GT


HyperGlycemia, improved


Tachycardia improved


Dehydration, Hypernatremia improved


UTI


Conjunctivitis


functional Quadriplegia





Plan:





DC agarwal


increase Lopressor-


H2O via GT


increase Levemir 30 u Q12 h 


SS insulin





DC to ECF


DNR DNI





Subjective


ROS Limited/Unobtainable:  No


Constitutional:  Reports: malaise


Allergies:  


Coded Allergies:  


     No Known Allergies (Unverified , 3/23/16)





Objective





Last 24 Hour Vital Signs








  Date Time  Temp Pulse Resp B/P (MAP) Pulse Ox O2 Delivery O2 Flow Rate FiO2


 


1/28/18 07:01  94 18  98 Room Air  21 1/28/18 06:53  90 16  97 Room Air  21 1/28/18 04:00 97.7 93 18 125/68 97 Room Air  


 


1/28/18 00:00 97.9 99 21 113/67 97 Room Air  


 


1/27/18 21:00  93  100/56    


 


1/27/18 20:01 97.5 93 21 100/56 97 Room Air  


 


1/27/18 19:59  98 18  98 Room Air  21 1/27/18 19:57  88 18  96 Room Air  21 1/27/18 16:21 98.1 98 20 109/63 98   


 


1/27/18 12:54  95 18  96 Room Air  21 1/27/18 12:05  96 18  98 Room Air  21 1/27/18 11:28 98.2 95 20 102/57 99   


 


1/27/18 09:00  104  94/66    

















Intake and Output  


 


 1/27/18 1/28/18





 19:00 07:00


 


Intake Total 275 ml 1660 ml


 


Output Total 800 ml 


 


Balance -525 ml 1660 ml


 


  


 


Free Water 80 ml 300 ml


 


IV Total 75 ml 880 ml


 


Tube Feeding 120 ml 480 ml


 


Output Urine Total 800 ml 


 


# Voids  1


 


# Bowel Movements 1 








Laboratory Tests


1/27/18 09:15: 


White Blood Count 9.1, Red Blood Count 5.07, Hemoglobin 16.5, Hematocrit 51.2, 

Mean Corpuscular Volume 101H, Mean Corpuscular Hemoglobin 32.5H, Mean 

Corpuscular Hemoglobin Concent 32.2, Red Cell Distribution Width 12.5, Platelet 

Count 88L, Mean Platelet Volume 8.1, Neutrophils (%) (Auto) , Lymphocytes (%) (

Auto) , Monocytes (%) (Auto) , Eosinophils (%) (Auto) , Basophils (%) (Auto) , 

Differential Total Cells Counted 100, Neutrophils % (Manual) 67, Lymphocytes % (

Manual) 26, Monocytes % (Manual) 5, Eosinophils % (Manual) 2, Basophils % (

Manual) 0, Band Neutrophils 0, Platelet Estimate DecreasedL, Platelet 

Morphology Normal, Macrocytosis 1+, Sodium Level 155H, Potassium Level 3.9, 

Chloride Level 117H, Carbon Dioxide Level 33H, Anion Gap 5, Blood Urea Nitrogen 

30H, Creatinine 1.1, Estimat Glomerular Filtration Rate , Glucose Level 348H, 

Calcium Level 10.0, Phosphorus Level 3.8, Magnesium Level 2.0, Total Bilirubin 

1.0, Aspartate Amino Transf (AST/SGOT) 22, Alanine Aminotransferase (ALT/SGPT) 

30, Alkaline Phosphatase 115, C-Reactive Protein, Quantitative < 0.4, Total 

Protein 6.9, Albumin 2.7L, Globulin 4.2, Albumin/Globulin Ratio 0.6L


1/28/18 05:50: 


White Blood Count 6.8, Red Blood Count 4.56L, Hemoglobin 14.6, Hematocrit 44.7, 

Mean Corpuscular Volume 98, Mean Corpuscular Hemoglobin 32.0H, Mean Corpuscular 

Hemoglobin Concent 32.6, Red Cell Distribution Width 11.9, Platelet Count 72L, 

Mean Platelet Volume 8.9, Neutrophils (%) (Auto) , Lymphocytes (%) (Auto) , 

Monocytes (%) (Auto) , Eosinophils (%) (Auto) , Basophils (%) (Auto) , 

Neutrophils % (Manual) [Pending], Lymphocytes % (Manual) [Pending], Platelet 

Estimate [Pending], Platelet Morphology [Pending], Sodium Level 153H, Potassium 

Level 3.5, Chloride Level 116H, Carbon Dioxide Level 29, Anion Gap 8, Blood 

Urea Nitrogen 27H, Creatinine 0.9, Estimat Glomerular Filtration Rate , Glucose 

Level 182#H, Calcium Level 9.3, Phosphorus Level 3.3, Magnesium Level 1.9, 

Total Bilirubin 1.0, Aspartate Amino Transf (AST/SGOT) 17, Alanine 

Aminotransferase (ALT/SGPT) 19, Alkaline Phosphatase 102, Total Protein 6.0L, 

Albumin 2.3L, Globulin 3.7, Albumin/Globulin Ratio 0.6L, Cortisol AM Sample [

Pending]


Height (Feet):  6


Height (Inches):  3.00


Weight (Pounds):  220


General Appearance:  no apparent distress


Cardiovascular:  regular rhythm


Respiratory/Chest:  decreased breath sounds


Abdomen:  soft


Neurologic:  other - bed bound


Objective


no change











NAY CALDWELL Jan 28, 2018 08:51

## 2018-01-30 NOTE — DISCHARGE SUMMARY
Discharge Summary


Hospital Course


Date of Admission


Jan 24, 2018 at 14:36


Date of Discharge


Jan 28, 2018 at 20:00


Admitting Diagnosis


hyperglycemia/tachycardia


HPI


Jolly Shaw is a 88 year old male who was admitted on Jan 24, 2018 at 14:36 for 

Hyperglycemia,Tachycardia


Hospital Course


dc summary #[id number]





Discharge Medications


New Medications:  


Cephalexin* (Keflex*) 500 Mg Capsule


500 MG ORAL EVERY 6 HOURS for 7 Days, CAP





Insulin Aspart (Novolog Flexpen) 100 Unit/1 Ml Insuln.pen


0 UNITS SUBQ Q6HR for 30 Days, EA





Insulin Detemir (Levemir Flexpen) 100 Unit/1 Ml Insuln.pen


30 UNITS SUBQ Q12HR for 30 Days, EA





 


Continued Medications:  


Acetaminophen* (Acetaminophen*) 160 Mg/5 Ml Solution


650 MG ORAL Q6H PRN for Mild Pain/Temp > 100.5, ML





Aspirin* (Aspir 81*) 81 Mg Tablet.dr


81 MG GT DAILY, TAB





Docusate Sodium (Docusate Sodium) 100 Mg Tablet


100 MG GT DAILY, #30 TAB 0 Refills





Metoclopramide HCl (Metoclopramide HCl) 10 Mg/10 Ml Solution


5 MG GT EVERY 6 HOURS for 30 Days, #120 TAB





Metoprolol Tartrate (Metoprolol Tartrate) 25 Mg Tablet


12.5 MG ORAL Q12HR for 30 Days, TAB





Ranitidine Hcl* (Zantac*) 150 Mg Tablet


150 MG GT TWICE A DAY, TAB





Sennosides (Senna) 8.6 Mg Tablet


8.6 MG PO BEDTIME for Constipation, TAB





Sitagliptin (Januvia) 100 Mg Tablet


100 MG GT DAILY, TAB





Tamsulosin Hcl (Tamsulosin Hcl*) 0.4 Mg Cap.er.24h


0.4 MG GT BEDTIME, CAP











Discharge


Discharge Disposition


Patient was discharged to SNF/Subacute Facility(03)


Discharge Diagnoses:  





Discharge Instructions


Discharge Instructions


Follow up with:  by myself at [facility]


Special Instructions


I have been assigned to complete a D/C Summary on this account. I was not 

involved in the patient management











Mary Markham NP (Vanchtein) Jan 30, 2018 10:48

## 2018-02-02 NOTE — DISCHARGE SUMMARY 2 SIG
DATE OF ADMISSION:  01/24/2018



DATE OF DISCHARGE:  01/28/2018



REASON FOR ADMISSION:  88-year-old male with past medical

history significant for diabetes, hypertension, chronic obstructive

pulmonary disease, BPH, and dementia, presented to emergency department

from [facility] nursing [facility] for evaluation of hyperglycemia.  Upon

admission, blood sugar -526.  The patient also had associated

tachycardia with heart rate -117.  Blood pressure was stable.  Pulse

oximetry was 99% on two liters.  Laboratory workup was consistent with

acute kidney injury.  BUN- 53 and creatinine -1.5.  Sodium -155.  Anion gap,

potassium, and bicarbonate were normal.  The patient was not in diabetic

ketoacidosis.  Tachycardia was likely due to dehydration.  No

leukocytosis.  Chest x-ray negative for any acute cardiopulmonary

pathology.  Urinalysis with pyuria and +3 leukocyte esterase.  EKG

revealed sinus rhythm and sinus tachycardia.  No acute ischemic changes.

The patient was with  DNR/DNI status. Patient was pancultured and

started on empiric antibiotics.  Subsequently was transferred to Med/Surg 

floor with admitting diagnoses of hyperglycemia, dehydration, hypernatremia, 

urinary tract infection, and conjunctivitis bilateral eyes.



HOSPITAL COURSE:  The patient was admitted.  ID consult was requested.  

The patient was hydrated.  Renal parameters and electrolytes were closely

monitored and corrected as needed.  Nephrotoxics were avoided.  Blood

sugar was closely monitored. Blood sugar was managed with long-acting Levemir, 

Prandin, and sliding scale of insulin as needed. Hemoglobin A1c - 9.5.  Blood 
sugar 

improved prior to discharge.  The patient needs further optimization of anti-
glycemic 

regimen at the skilled nursing facility.  Strict aspiration precaution 
maintained.  

G-tube feeding was resumed.  Blood pressure was managed with current 
medications.  

Other skilled nursing facility medications were resumed.  ID closely followed.

Urine culture revealed Candida.  Acute kidney injury due to dehydration 
resolved. 

BUN -27 and creatinine- 0.9 prior to discharge.  Sodium only down to 153.

Patient  needs close monitoring of renal parameters and electrolytes in skilled 
nursing

facility.  Conjunctivitis was treated with ophthalmic antibiotic under ID 
supervision.    

A wound care nurse followed the patient for multiple deep tissue injury 
pressure 

ulcers present on admission,  including sacral, coccyx, left heel, and right 
heel.  

Continue wound care at the skilled nursing facility.



FINAL DIAGNOSES:

1. Hyperglycemia associated with diabetes mellitus.

2. Diabetes mellitus, out of control.

3. Dehydration.

4. Hypernatremia.

5. Urinary tract infection with Candida.

6. Conjunctivitis, bilateral eyes.

7. Feeding by G-tube.

8. Functional quadriplegia.

9. Acute kidney injury secondary to dehydration, resolved.

10. Multiple deep tissue injuries,  present on admission.



DISCHARGE MEDICATIONS:  See medication reconciliation list.



DISCHARGE INSTRUCTIONS:  The patient was discharged to skilled nursing

facility.  The patient  DNR/DNI status.  Follow up with medical doctor

at the facility.







  ______________________________________________

  Nish Schilling M.D.



I have been assigned to dictate discharge summary on this account and I

was not involved in the patient's management.



  ______________________________________________

  Mary PiersonAlice Hyde Medical CenterDylan N.PErika





DR:  CEFERINO

D:  01/30/2018 10:47

T:  02/02/2018 02:01

JOB#:  [id number]

CC:



KARRI

## 2018-02-04 NOTE — CARDIOLOGY REPORT
--------------- APPROVED REPORT --------------





EKG Measurement

Heart [id number]

IN [id number]

[id number]

[id number]

[id number]





Sinus tachycardia

Low voltage QRS

Borderline ECG

## 2018-02-27 ENCOUNTER — HOSPITAL ENCOUNTER (INPATIENT)
Dept: HOSPITAL 72 - EMR | Age: 83
LOS: 6 days | Discharge: NURSING, REHAB OR LONG-TERM CARE | DRG: 871 | End: 2018-03-05
Payer: MEDICARE

## 2018-02-27 VITALS — SYSTOLIC BLOOD PRESSURE: 123 MMHG | DIASTOLIC BLOOD PRESSURE: 61 MMHG

## 2018-02-27 VITALS — DIASTOLIC BLOOD PRESSURE: 55 MMHG | SYSTOLIC BLOOD PRESSURE: 109 MMHG

## 2018-02-27 VITALS — DIASTOLIC BLOOD PRESSURE: 80 MMHG | SYSTOLIC BLOOD PRESSURE: 136 MMHG

## 2018-02-27 VITALS — DIASTOLIC BLOOD PRESSURE: 49 MMHG | SYSTOLIC BLOOD PRESSURE: 99 MMHG

## 2018-02-27 VITALS — DIASTOLIC BLOOD PRESSURE: 46 MMHG | SYSTOLIC BLOOD PRESSURE: 100 MMHG

## 2018-02-27 VITALS — DIASTOLIC BLOOD PRESSURE: 58 MMHG | SYSTOLIC BLOOD PRESSURE: 114 MMHG

## 2018-02-27 VITALS — WEIGHT: 171.31 LBS | BODY MASS INDEX: 26.89 KG/M2 | HEIGHT: 67 IN

## 2018-02-27 VITALS — DIASTOLIC BLOOD PRESSURE: 48 MMHG | SYSTOLIC BLOOD PRESSURE: 100 MMHG

## 2018-02-27 VITALS — DIASTOLIC BLOOD PRESSURE: 83 MMHG | SYSTOLIC BLOOD PRESSURE: 137 MMHG

## 2018-02-27 DIAGNOSIS — J96.01: ICD-10-CM

## 2018-02-27 DIAGNOSIS — Z93.1: ICD-10-CM

## 2018-02-27 DIAGNOSIS — I10: ICD-10-CM

## 2018-02-27 DIAGNOSIS — R13.10: ICD-10-CM

## 2018-02-27 DIAGNOSIS — L89.150: ICD-10-CM

## 2018-02-27 DIAGNOSIS — A41.9: Primary | ICD-10-CM

## 2018-02-27 DIAGNOSIS — E11.9: ICD-10-CM

## 2018-02-27 DIAGNOSIS — F03.90: ICD-10-CM

## 2018-02-27 DIAGNOSIS — R53.2: ICD-10-CM

## 2018-02-27 DIAGNOSIS — J18.9: ICD-10-CM

## 2018-02-27 DIAGNOSIS — R11.10: ICD-10-CM

## 2018-02-27 DIAGNOSIS — L89.620: ICD-10-CM

## 2018-02-27 LAB
ADD MANUAL DIFF: YES
ALBUMIN SERPL-MCNC: 2.1 G/DL (ref 3.4–5)
ALBUMIN/GLOB SERPL: 0.6 {RATIO} (ref 1–2.7)
ALP SERPL-CCNC: 78 U/L (ref 46–116)
ALT SERPL-CCNC: 20 U/L (ref 12–78)
ANION GAP SERPL CALC-SCNC: 11 MMOL/L (ref 5–15)
APPEARANCE UR: CLEAR
APTT BLD: 26 SEC (ref 23–33)
APTT PPP: YELLOW S
AST SERPL-CCNC: 16 U/L (ref 15–37)
BILIRUB DIRECT SERPL-MCNC: 0.3 MG/DL (ref 0–0.3)
BILIRUB SERPL-MCNC: 1.4 MG/DL (ref 0.2–1)
BUN SERPL-MCNC: 18 MG/DL (ref 7–18)
CALCIUM SERPL-MCNC: 7.1 MG/DL (ref 8.5–10.1)
CHLORIDE SERPL-SCNC: 106 MMOL/L (ref 98–107)
CK MB SERPL-MCNC: 0.5 NG/ML (ref 0–3.6)
CK SERPL-CCNC: 18 U/L (ref 26–308)
CO2 SERPL-SCNC: 21 MMOL/L (ref 21–32)
CREAT SERPL-MCNC: 0.8 MG/DL (ref 0.55–1.3)
ERYTHROCYTE [DISTWIDTH] IN BLOOD BY AUTOMATED COUNT: 11.8 % (ref 11.6–14.8)
GLOBULIN SER-MCNC: 3.4 G/DL
GLUCOSE UR STRIP-MCNC: (no result) MG/DL
HCT VFR BLD CALC: 41.2 % (ref 42–52)
HGB BLD-MCNC: 14.3 G/DL (ref 14.2–18)
INR PPP: 1 (ref 0.9–1.1)
KETONES UR QL STRIP: (no result)
LEUKOCYTE ESTERASE UR QL STRIP: (no result)
MCV RBC AUTO: 95 FL (ref 80–99)
NITRITE UR QL STRIP: NEGATIVE
PH UR STRIP: 5 [PH] (ref 4.5–8)
PLATELET # BLD: 103 K/UL (ref 150–450)
POTASSIUM SERPL-SCNC: 3.2 MMOL/L (ref 3.5–5.1)
PROT UR QL STRIP: (no result)
RBC # BLD AUTO: 4.34 M/UL (ref 4.7–6.1)
SODIUM SERPL-SCNC: 138 MMOL/L (ref 136–145)
SP GR UR STRIP: 1.01 (ref 1–1.03)
UROBILINOGEN UR-MCNC: NORMAL MG/DL (ref 0–1)
WBC # BLD AUTO: 6.4 K/UL (ref 4.8–10.8)

## 2018-02-27 PROCEDURE — 36415 COLL VENOUS BLD VENIPUNCTURE: CPT

## 2018-02-27 PROCEDURE — 74018 RADEX ABDOMEN 1 VIEW: CPT

## 2018-02-27 PROCEDURE — 82248 BILIRUBIN DIRECT: CPT

## 2018-02-27 PROCEDURE — 84550 ASSAY OF BLOOD/URIC ACID: CPT

## 2018-02-27 PROCEDURE — 82962 GLUCOSE BLOOD TEST: CPT

## 2018-02-27 PROCEDURE — 85025 COMPLETE CBC W/AUTO DIFF WBC: CPT

## 2018-02-27 PROCEDURE — 93306 TTE W/DOPPLER COMPLETE: CPT

## 2018-02-27 PROCEDURE — 93970 EXTREMITY STUDY: CPT

## 2018-02-27 PROCEDURE — 94664 DEMO&/EVAL PT USE INHALER: CPT

## 2018-02-27 PROCEDURE — 87205 SMEAR GRAM STAIN: CPT

## 2018-02-27 PROCEDURE — 84443 ASSAY THYROID STIM HORMONE: CPT

## 2018-02-27 PROCEDURE — 86140 C-REACTIVE PROTEIN: CPT

## 2018-02-27 PROCEDURE — 85610 PROTHROMBIN TIME: CPT

## 2018-02-27 PROCEDURE — 71045 X-RAY EXAM CHEST 1 VIEW: CPT

## 2018-02-27 PROCEDURE — 81003 URINALYSIS AUTO W/O SCOPE: CPT

## 2018-02-27 PROCEDURE — 82553 CREATINE MB FRACTION: CPT

## 2018-02-27 PROCEDURE — 84100 ASSAY OF PHOSPHORUS: CPT

## 2018-02-27 PROCEDURE — 94760 N-INVAS EAR/PLS OXIMETRY 1: CPT

## 2018-02-27 PROCEDURE — 84484 ASSAY OF TROPONIN QUANT: CPT

## 2018-02-27 PROCEDURE — 80061 LIPID PANEL: CPT

## 2018-02-27 PROCEDURE — 85730 THROMBOPLASTIN TIME PARTIAL: CPT

## 2018-02-27 PROCEDURE — 87040 BLOOD CULTURE FOR BACTERIA: CPT

## 2018-02-27 PROCEDURE — 99285 EMERGENCY DEPT VISIT HI MDM: CPT

## 2018-02-27 PROCEDURE — 83735 ASSAY OF MAGNESIUM: CPT

## 2018-02-27 PROCEDURE — 83880 ASSAY OF NATRIURETIC PEPTIDE: CPT

## 2018-02-27 PROCEDURE — 36600 WITHDRAWAL OF ARTERIAL BLOOD: CPT

## 2018-02-27 PROCEDURE — 87086 URINE CULTURE/COLONY COUNT: CPT

## 2018-02-27 PROCEDURE — 80202 ASSAY OF VANCOMYCIN: CPT

## 2018-02-27 PROCEDURE — 86710 INFLUENZA VIRUS ANTIBODY: CPT

## 2018-02-27 PROCEDURE — 82803 BLOOD GASES ANY COMBINATION: CPT

## 2018-02-27 PROCEDURE — 80053 COMPREHEN METABOLIC PANEL: CPT

## 2018-02-27 PROCEDURE — 94640 AIRWAY INHALATION TREATMENT: CPT

## 2018-02-27 PROCEDURE — 93005 ELECTROCARDIOGRAM TRACING: CPT

## 2018-02-27 PROCEDURE — 83605 ASSAY OF LACTIC ACID: CPT

## 2018-02-27 PROCEDURE — 85007 BL SMEAR W/DIFF WBC COUNT: CPT

## 2018-02-27 PROCEDURE — 82550 ASSAY OF CK (CPK): CPT

## 2018-02-27 PROCEDURE — 83036 HEMOGLOBIN GLYCOSYLATED A1C: CPT

## 2018-02-27 PROCEDURE — 87070 CULTURE OTHR SPECIMN AEROBIC: CPT

## 2018-02-27 PROCEDURE — 82977 ASSAY OF GGT: CPT

## 2018-02-27 RX ADMIN — Medication SCH TAB: at 21:55

## 2018-02-27 RX ADMIN — DEXTROSE MONOHYDRATE SCH MLS/HR: 50 INJECTION, SOLUTION INTRAVENOUS at 21:56

## 2018-02-27 RX ADMIN — DEXTROSE MONOHYDRATE SCH MLS/HR: 50 INJECTION, SOLUTION INTRAVENOUS at 17:07

## 2018-02-27 RX ADMIN — SODIUM CHLORIDE SCH MLS/HR: 900 INJECTION, SOLUTION INTRAVENOUS at 17:07

## 2018-02-27 RX ADMIN — HEPARIN SODIUM SCH UNITS: 5000 INJECTION INTRAVENOUS; SUBCUTANEOUS at 21:00

## 2018-02-27 RX ADMIN — METOCLOPRAMIDE HYDROCHLORIDE SCH MG: 5 SOLUTION ORAL at 17:09

## 2018-02-27 RX ADMIN — PANTOPRAZOLE SODIUM SCH MG: 40 INJECTION, POWDER, FOR SOLUTION INTRAVENOUS at 21:55

## 2018-02-27 NOTE — HISTORY & PHYSICAL
History and Physical


History & Physicial





aspiration pneumonia- respiratory distress


vomiting


?UTI


DM


GT feeding


functional Quadriplegia





# [id number]











NAY CALDWELL Feb 27, 2018 15:06

## 2018-02-27 NOTE — DIAGNOSTIC IMAGING REPORT
Indication: Dyspnea

 

Comparison:  1/24/2018

 

A single view chest radiograph was obtained.

 

Findings:

 

Suspected infiltrate in the right upper lobe. Please correlate clinically. Heart size

is normal. Bones are osteopenic.

 

IMPRESSION:

 

Suspected right-sided pneumonia

## 2018-02-27 NOTE — CONSULTATION
DATE OF CONSULTATION:  02/27/2018



INFECTIOUS DISEASE CONSULTATION



CONSULTING PHYSICIAN:  Amira Gross M.D.



REQUESTING PHYSICIAN:  Nish Schilling M.D.



REASON FOR CONSULTATION:  Pneumonia, sepsis, urinary tract infection,

recommendation for antibiotics treatment.



HISTORY OF PRESENT ILLNESS:  The patient is an 88-year-old male, who lives

at the nursing home with history of dementia, hypertension, diabetes, and

history of sepsis, who was sent to [facility] emergency room

with hypoxemia, tachycardia, and altered mental status.  As per the

report, the patient vomited at the nursing home and later became hypoxemic

and tachycardic, so he was sent to the hospital for evaluation.  The

patient was found to have right-side pneumonia suspicious for aspiration.

He was febrile with temperature maximum of 102 and hypoxemic.  He was

placed on high-flow oxygen via Ventimask and started on vancomycin and

Zosyn.  Infectious Disease consultation was requested for further

evaluation and management.  As of note, the patient is poor historian,

cannot provide good history.  History was mainly obtained from the medical

record.



PAST MEDICAL HISTORY:  Significant for diabetes, hypertension, dementia,

dysphagia, and CVA.



PAST SURGICAL HISTORY:  Not on record.



MEDICATIONS:  He received vancomycin and Zosyn, one dose each in the

emergency room.  For the rest of his medications, please refer to MAR.



ALLERGIES:  No known drug allergy.



SOCIAL HISTORY:  He lives at nursing home.  No recent drugs, tobacco, or

alcohol.



FAMILY HISTORY:  Unable to obtain.



REVIEW OF SYSTEMS:  A 14 point of system unable to review at this point.

The patient is a poor historian, cannot provide any history.



PHYSICAL EXAMINATION:

VITAL SIGNS:  Temperature 99.5 degrees, pulse 112, respirations 23, blood

pressure 100/48, and saturation 96% on 50% of FiO2 on Ventimask.

HEENT:  Normocephalic and atraumatic.  Pupils reactive to light equally.

Dry oral mucosa.  No exudate.

NECK:  Supple.  No lymphadenopathy.

CARDIOVASCULAR:  He is tachycardic.  S1 and S2 normal.  No murmur.

LUNGS:  He had wheezing diffuse in both sides with diminished breathing

sounds on the right lower lobe.

ABDOMEN:  Soft and not distended.  Tender in the suprapubic area with

mildly distended bladder.  No rebound.  No organomegaly.

EXTREMITIES:  No edema or cyanosis.

SKIN:  Mild heel bruises from pressure and bruises on both upper

extremities and skin redness.



LABORATORY AND DIAGNOSTIC DATA:  White count of 6.4, hemoglobin of 14.3,

and platelet count of 103,000.  BUN of 18 and creatinine of 0.8.

Urinalysis showed 1+ leukocyte esterase, wbc 2-4, and occasional bacteria.

Microbiology, influenza screening A and B, both negative.  Imaging, chest

x-ray showed right side pneumonia.



ASSESSMENT AND RECOMMENDATION:

1. Healthcare-acquired pneumonia, possible aspiration.  We will start

the patient on vancomycin and Zosyn empiric coverage, send sputum culture,

monitor chest x-ray with aspiration precaution, and keep head of the bed

more than 30 degrees.

2. UTI.  We will send urine culture.  The patient is already on Zosyn.

3. Sepsis due to the above.  We will send blood culture and start

vancomycin with Zosyn empiric coverage pending culture results.

4. Fever due to the above.  Continue wide-spectrum antibiotics and

Tylenol as needed.

5. Acute respiratory failure with hypoxemia due to the above.  Continue

high-flow oxygen via mask and inhalers.  Monitor chest x-ray.

6. Diabetes.  Recommend tight glycemic control to keep blood glucose

between 100 to 140.



Thank you for the consult.  Infectious Diseases will continue to follow.

Please feel free to call with any question.









  ______________________________________________

  Amira Gross M.D.





DR:  JESUS

D:  02/27/2018 15:00

T:  02/27/2018 20:44

JOB#:  [id number]

CC:

## 2018-02-27 NOTE — DIAGNOSTIC IMAGING REPORT
Indication: Abdominal pain. Vomiting

 

Comparison:  None

 

Single view of the abdomen obtained 

 

Findings:

   

Bowel gas pattern is nonspecific. No mass, ectopic calcifications, or abnormal gas

collections are identified. There is a gastrostomy tube noted. Vascular

calcifications are present. Partial visualization of a right dynamic hip screw noted.

The bones are osteopenic.

 

Impression: No acute findings

## 2018-02-27 NOTE — INFECTIOUS DISEASES PROG NOTE
Assessment/Plan


Problems:  


(1) HCAP (healthcare-associated pneumonia)


Assessment & Plan:  with right side lung infiltrates, will start vancomycin and 

zosyn empiric coverage, pending sputum culture , monitor CXR 





(2) UTI (urinary tract infection)


Assessment & Plan:  will send urine culture and continue zosyn 





(3) Sepsis


Assessment & Plan:  due to the above, will send blood culture and start 

vancomycin with zosyn empirically 





(4) Fever


Assessment & Plan:  due to the above, continue wide spectrum antibiotics, and 

tylenol as needed 





(5) Acute respiratory failure


Assessment & Plan:  due to the above, continue High flow oxygen via mask, and 

inhalers, monitor CXR 





(6) Diabetes mellitus


Assessment & Plan:  recommend tight glycemic control to keep blood glucose 

between 100-140 








Subjective


Allergies:  


Coded Allergies:  


     No Known Allergies (Unverified , 3/23/16)





Objective


Vital Signs





Last 24 Hour Vital Signs








  Date Time  Temp Pulse Resp B/P (MAP) Pulse Ox O2 Delivery O2 Flow Rate FiO2


 


2/27/18 13:36 99.5 112 23 100/48 96 Venturi Mask 15.0 50





 99.5       


 


2/27/18 11:30 99.5 108 25 100/46 98 Venturi Mask 15.0 50





 99.5       


 


2/27/18 10:44 99.5 110 25 109/55 98 Venturi Mask 15.0 50





 99.5       


 


2/27/18 09:40 100.7       





 100.7       


 


2/27/18 09:16  108 20 99/49 99 Non-Rebreather 15.0 


 


2/27/18 08:20 101.0       


 


2/27/18 08:07 101.0 119 23 123/61 93 Venturi Mask 15.0 50





 101.0       


 


2/27/18 06:49 99.1       


 


2/27/18 06:30  128 26   Venturi Mask 12.0 50


 


2/27/18 06:30 102.1 128 26 136/80 94 Venturi Mask 12.0 50





 102.1       


 


2/27/18 06:07 99.1 118 20 136/80 97 Nasal Cannula 3.0 





 99.1       








Height (Feet):  5


Height (Inches):  8.00


Weight (Pounds):  180





Microbiology








 Date/Time


Source Procedure


Growth Status


 


 


 2/27/18 06:38


Nasal Nares Influenza Types A,B Antigen (LINO) - Final Complete











Laboratory Tests








Test


  2/27/18


06:40 2/27/18


07:30 2/27/18


08:30 2/27/18


08:51


 


White Blood Count


  6.4 K/UL


(4.8-10.8) 


  


  


 


 


Red Blood Count


  4.34 M/UL


(4.70-6.10)  L 


  


  


 


 


Hemoglobin


  14.3 G/DL


(14.2-18.0) 


  


  


 


 


Hematocrit


  41.2 %


(42.0-52.0)  L 


  


  


 


 


Mean Corpuscular Volume 95 FL (80-99)     


 


Mean Corpuscular Hemoglobin


  33.0 PG


(27.0-31.0)  H 


  


  


 


 


Mean Corpuscular Hemoglobin


Concent 34.8 G/DL


(32.0-36.0) 


  


  


 


 


Red Cell Distribution Width


  11.8 %


(11.6-14.8) 


  


  


 


 


Platelet Count


  103 K/UL


(150-450)  L 


  


  


 


 


Mean Platelet Volume


  8.2 FL


(6.5-10.1) 


  


  


 


 


Neutrophils (%) (Auto)


  % (45.0-75.0)


  


  


  


 


 


Lymphocytes (%) (Auto)


  % (20.0-45.0)


  


  


  


 


 


Monocytes (%) (Auto)  % (1.0-10.0)     


 


Eosinophils (%) (Auto)  % (0.0-3.0)     


 


Basophils (%) (Auto)  % (0.0-2.0)     


 


Differential Total Cells


Counted 100  


  


  


  


 


 


Neutrophils % (Manual) 72 % (45-75)     


 


Lymphocytes % (Manual) 7 % (20-45)  L   


 


Monocytes % (Manual) 12 % (1-10)  H   


 


Eosinophils % (Manual) 0 % (0-3)     


 


Basophils % (Manual) 1 % (0-2)     


 


Band Neutrophils 8 % (0-8)     


 


Platelet Estimate Decreased  L   


 


Platelet Morphology Normal     


 


Anisocytosis 1+     


 


Prothrombin Time


  10.8 SEC


(9.30-11.50) 


  


  


 


 


Prothromb Time International


Ratio 1.0 (0.9-1.1)  


  


  


  


 


 


Activated Partial


Thromboplast Time 26 SEC (23-33)


  


  


  


 


 


Sodium Level


  138 MMOL/L


(136-145) 


  


  


 


 


Potassium Level


  3.2 MMOL/L


(3.5-5.1)  L 


  


  


 


 


Chloride Level


  106 MMOL/L


() 


  


  


 


 


Carbon Dioxide Level


  21 MMOL/L


(21-32) 


  


  


 


 


Anion Gap


  11 mmol/L


(5-15) 


  


  


 


 


Blood Urea Nitrogen


  18 mg/dL


(7-18) 


  


  


 


 


Creatinine


  0.8 MG/DL


(0.55-1.30) 


  


  


 


 


Estimat Glomerular Filtration


Rate  mL/min (>60)  


  


  


  


 


 


Glucose Level


  256 MG/DL


()  H 


  


  


 


 


Lactic Acid Level


  4.00 mmol/L


(0.66-2.22)  H 


  1.00 mmol/L


(0.66-2.22) 


 


 


Calcium Level


  7.1 MG/DL


(8.5-10.1)  L 


  


  


 


 


Total Bilirubin


  1.4 MG/DL


(0.2-1.0)  H 


  


  


 


 


Direct Bilirubin


  0.3 MG/DL


(0.0-0.3) 


  


  


 


 


Aspartate Amino Transf


(AST/SGOT) 16 U/L (15-37)


  


  


  


 


 


Alanine Aminotransferase


(ALT/SGPT) 20 U/L (12-78)


  


  


  


 


 


Alkaline Phosphatase


  78 U/L


() 


  


  


 


 


Total Creatine Kinase


  18 U/L


()  L 


  


  


 


 


Creatine Kinase MB


  0.5 NG/ML


(0.0-3.6) 


  


  


 


 


Creatine Kinase MB Relative


Index 2.7  


  


  


  


 


 


Troponin I


  0.000 ng/mL


(0.000-0.056) 


  


  


 


 


Total Protein


  5.5 G/DL


(6.4-8.2)  L 


  


  


 


 


Albumin


  2.1 G/DL


(3.4-5.0)  L 


  


  


 


 


Globulin 3.4 g/dL     


 


Albumin/Globulin Ratio


  0.6 (1.0-2.7)


L 


  


  


 


 


Urine Color  Yellow    


 


Urine Appearance  Clear    


 


Urine pH  5 (4.5-8.0)    


 


Urine Specific Gravity


  


  1.015


(1.005-1.035) 


  


 


 


Urine Protein


  


  1+ (NEGATIVE)


H 


  


 


 


Urine Glucose (UA)


  


  4+ (NEGATIVE)


H 


  


 


 


Urine Ketones


  


  3+ (NEGATIVE)


H 


  


 


 


Urine Occult Blood


  


  1+ (NEGATIVE)


H 


  


 


 


Urine Nitrite


  


  Negative


(NEGATIVE) 


  


 


 


Urine Bilirubin


  


  Negative


(NEGATIVE) 


  


 


 


Urine Urobilinogen


  


  Normal MG/DL


(0.0-1.0) 


  


 


 


Urine Leukocyte Esterase


  


  1+ (NEGATIVE)


H 


  


 


 


Urine RBC


  


  2-4 /HPF (0 -


0)  H 


  


 


 


Urine WBC


  


  2-4 /HPF (0 -


0) 


  


 


 


Urine Squamous Epithelial


Cells 


  Occasional


/LPF 


  


 


 


Urine Bacteria


  


  Occasional


/HPF (NONE) 


  


 


 


Arterial Blood pH


  


  


  


  7.390


(7.350-7.450)


 


Arterial Blood Partial


Pressure CO2 


  


  


  33.5 mmHg


(35.0-45.0)  L


 


Arterial Blood Partial


Pressure O2 


  


  


  80.5 mmHg


(75.0-100.0)


 


Arterial Blood HCO3


  


  


  


  20.0 mmol/L


(22.0-26.0)  L


 


Arterial Blood Oxygen


Saturation 


  


  


  95.9 %


(92.0-98.0)


 


Arterial Blood Base Excess    -4.1  


 


Hernan Test    Positive  

















Amira Gross M.D. Feb 27, 2018 13:59

## 2018-02-27 NOTE — EMERGENCY ROOM REPORT
History of Present Illness


General


Chief Complaint:  Dyspnea/Respdistress


Source:  Medical Record, EMS





Present Illness


HPI


This is an 88-year-old male from nursing home.  He has history of dementia, 

renal failure, hypertension, diabetes and previous sepsis.  He presents with 

altered mental status with hypoxia and tachycardia.  Onset today.  Unable to 

get any history from the patient.  Patient was recently admitted here last 

month for sepsis.


Allergies:  


Coded Allergies:  


     No Known Allergies (Unverified , 3/23/16)





Patient History


Past Medical History:  see triage record, old chart reviewed, DM, HTN


Past Surgical History:  other


Pertinent Family History:  none


Social History:  Denies: smoking


Immunizations:  UTD


Reviewed Nursing Documentation:  PMH: Agreed, PSxH: Agreed





Nursing Documentation-PMH


Hx Hypertension:  Yes


Hx COPD:  Yes - PNA


Hx Diabetes:  Yes


Hx Cerebrovascular Accident:  Yes - muscle weakness


Hx Dementia:  Yes


Hx Dysphasia:  Yes





Review of Systems


Constitutional:  Reports: fever, weakness


Respiratory:  Reports: shortness of breath


All Other Systems:  limited - Secondary to condition





Physical Exam





Vital Signs








  Date Time  Temp Pulse Resp B/P (MAP) Pulse Ox O2 Delivery O2 Flow Rate FiO2


 


2/27/18 06:07 99.1 118 20 136/80 97 Nasal Cannula 3.0 





 99.1       





vitals with tachycardia, fever and hypoxia


Sp02 EP Interpretation:  abnormal


General Appearance:  moderate distress, Chronically Ill


Head:  normocephalic, atraumatic


Eyes:  bilateral eye PERRL, bilateral eye EOMI


ENT:  dry mucus membranes


Neck:  full range of motion, supple, no meningismus


Respiratory:  chest non-tender, respiratory distress, decreased breath sounds, 

crackles, rhonchi


Cardiovascular #1:  no murmur, tachycardia


Gastrointestinal:  normal bowel sounds, non tender, no mass, no organomegaly, 

no bruit, non-distended


Musculoskeletal:  back normal, other - Contracted


Psychiatric:  mood/affect normal


Skin:  warm/dry





Medical Decision Making


ER Course


Patient presents with respiratory distress, most likely sepsis.  Labs, chest x-

ray ordered.  I will sign out patient to Dr. Beth for final disposition.





Last Vital Signs








  Date Time  Temp Pulse Resp B/P (MAP) Pulse Ox O2 Delivery O2 Flow Rate FiO2


 


2/27/18 06:07 99.1 118 20 136/80 97 Nasal Cannula 3.0 





 99.1       

















JOSÉ MIGUEL HERNANDEZ M.D. Feb 27, 2018 06:26

## 2018-02-27 NOTE — CARDIOLOGY REPORT
--------------- APPROVED REPORT --------------





EKG Measurement

Heart [id number]

WV [id number]

[id number]

[id number]

[id number]





Sinus tachycardia

Low voltage QRS

Borderline ECG

## 2018-02-27 NOTE — HISTORY AND PHYSICAL REPORT
DATE OF ADMISSION:  02/27/2018



HISTORY OF PRESENT ILLNESS:  The patient is an 88-year-old from [facility] with multiple medical problems.  The patient started

vomiting and was hypoxic, hence was sent through the emergency room here

at [facility].  After initial evaluation, is being admitted

with the diagnosis of aspiration pneumonia.



PAST MEDICAL HISTORY:  Significant for dementia, previous percutaneous

endoscopy gastrostomy tube insertion, anemia, previous history of urinary

tract infection, history of diabetes mellitus, hypertension, and

osteoarthritis.



ALLERGIES:  None.



PHYSICAL EXAMINATION:

GENERAL:  At this time, the patient is somewhat lethargic.  The patient is

slightly pale.

VITAL SIGNS:  Tachycardic with a rate of 112, T max 100.7, respiratory rate

23.  HEENT



Sclerae are not icteric.

NECK:  Rigid to all directions.

HEART:  Tachycardic with occasional irregular beats.

LUNGS:  Rhonchi few on the bases with decreased breath sounds.

ABDOMEN:  Soft.  Some degree of voluntary guarding.  GT tube in place.  The

suprapubic area appears full to touch.

EXTREMITIES:  Lower extremities, no edema.  The patient moves all

extremities.



LABORATORY RESULTS:  Hemoglobin 14.7, WBC 6.4.  Potassium 3.2, glucose 256,

calcium 7.1.  Albumin 2.1.  C-reactive protein 8.1, and lactic acid 4.

Urinalysis, 1+ leukocyte esterase and 4 white blood cells.



IMPRESSION:

1. Acute respiratory distress.

2. Aspiration pneumonia.

3. Vomiting.

4. Urinary tract infection.

5. Diabetes mellitus.

6. Percutaneous endoscopic gastrostomy.

7. Functional quadriplegia.



PLAN:  At this point, the patient is already started on piperacillin and

vancomycin and we will continue on IV Protonix, slow IV hydration, Reglan

through GT tube.  Fluid management.  Monitor blood sugar.  Infectious

Disease and pulmonary evaluations.  According to how the patient's

condition evolves, we will make the proper changes in our future

management.









  ______________________________________________

  Nish Schilling M.D.





DR:  MANISHA

D:  02/27/2018 16:08

T:  02/27/2018 20:44

JOB#:  [id number]

CC:

## 2018-02-28 VITALS — DIASTOLIC BLOOD PRESSURE: 80 MMHG | SYSTOLIC BLOOD PRESSURE: 108 MMHG

## 2018-02-28 VITALS — DIASTOLIC BLOOD PRESSURE: 46 MMHG | SYSTOLIC BLOOD PRESSURE: 96 MMHG

## 2018-02-28 VITALS — DIASTOLIC BLOOD PRESSURE: 51 MMHG | SYSTOLIC BLOOD PRESSURE: 112 MMHG

## 2018-02-28 VITALS — DIASTOLIC BLOOD PRESSURE: 53 MMHG | SYSTOLIC BLOOD PRESSURE: 121 MMHG

## 2018-02-28 VITALS — SYSTOLIC BLOOD PRESSURE: 114 MMHG | DIASTOLIC BLOOD PRESSURE: 56 MMHG

## 2018-02-28 VITALS — DIASTOLIC BLOOD PRESSURE: 53 MMHG | SYSTOLIC BLOOD PRESSURE: 100 MMHG

## 2018-02-28 LAB
ADD MANUAL DIFF: NO
ALBUMIN SERPL-MCNC: 2.2 G/DL (ref 3.4–5)
ALBUMIN/GLOB SERPL: 0.6 {RATIO} (ref 1–2.7)
ALP SERPL-CCNC: 55 U/L (ref 46–116)
ALT SERPL-CCNC: 16 U/L (ref 12–78)
ANION GAP SERPL CALC-SCNC: 6 MMOL/L (ref 5–15)
AST SERPL-CCNC: 13 U/L (ref 15–37)
BASOPHILS NFR BLD AUTO: 0.7 % (ref 0–2)
BILIRUB DIRECT SERPL-MCNC: 0.2 MG/DL (ref 0–0.3)
BILIRUB SERPL-MCNC: 1.4 MG/DL (ref 0.2–1)
BUN SERPL-MCNC: 16 MG/DL (ref 7–18)
CALCIUM SERPL-MCNC: 8.5 MG/DL (ref 8.5–10.1)
CHLORIDE SERPL-SCNC: 105 MMOL/L (ref 98–107)
CHOLEST SERPL-MCNC: < 50 MG/DL (ref ?–200)
CK SERPL-CCNC: 52 U/L (ref 26–308)
CO2 SERPL-SCNC: 28 MMOL/L (ref 21–32)
CREAT SERPL-MCNC: 1.2 MG/DL (ref 0.55–1.3)
EOSINOPHIL NFR BLD AUTO: 0.3 % (ref 0–3)
ERYTHROCYTE [DISTWIDTH] IN BLOOD BY AUTOMATED COUNT: 12 % (ref 11.6–14.8)
GAMMA GLUTAMYL TRANSPEPTIDASE: 13 U/L (ref 5–85)
GLOBULIN SER-MCNC: 3.4 G/DL
HCT VFR BLD CALC: 31 % (ref 42–52)
HDLC SERPL-MCNC: 29 MG/DL (ref 40–60)
HGB BLD-MCNC: 11 G/DL (ref 14.2–18)
LYMPHOCYTES NFR BLD AUTO: 8.5 % (ref 20–45)
MCV RBC AUTO: 95 FL (ref 80–99)
MONOCYTES NFR BLD AUTO: 7.4 % (ref 1–10)
NEUTROPHILS NFR BLD AUTO: 83.1 % (ref 45–75)
PHOSPHATE SERPL-MCNC: 2.5 MG/DL (ref 2.5–4.9)
PLATELET # BLD: 106 K/UL (ref 150–450)
POTASSIUM SERPL-SCNC: 3.7 MMOL/L (ref 3.5–5.1)
RBC # BLD AUTO: 3.26 M/UL (ref 4.7–6.1)
SODIUM SERPL-SCNC: 139 MMOL/L (ref 136–145)
TRIGL SERPL-MCNC: 31 MG/DL (ref 30–150)
WBC # BLD AUTO: 5.8 K/UL (ref 4.8–10.8)

## 2018-02-28 RX ADMIN — SITAGLIPTIN SCH MG: 50 TABLET, FILM COATED ORAL at 09:02

## 2018-02-28 RX ADMIN — ALBUTEROL SULFATE SCH MG: 2.5 SOLUTION RESPIRATORY (INHALATION) at 14:54

## 2018-02-28 RX ADMIN — SODIUM CHLORIDE SCH MLS/HR: 900 INJECTION, SOLUTION INTRAVENOUS at 06:21

## 2018-02-28 RX ADMIN — METOCLOPRAMIDE HYDROCHLORIDE SCH MG: 5 SOLUTION ORAL at 18:24

## 2018-02-28 RX ADMIN — DEXTROSE MONOHYDRATE SCH MLS/HR: 50 INJECTION, SOLUTION INTRAVENOUS at 13:53

## 2018-02-28 RX ADMIN — METOCLOPRAMIDE HYDROCHLORIDE SCH MG: 5 SOLUTION ORAL at 00:18

## 2018-02-28 RX ADMIN — HEPARIN SODIUM SCH UNITS: 5000 INJECTION INTRAVENOUS; SUBCUTANEOUS at 20:25

## 2018-02-28 RX ADMIN — DEXTROSE MONOHYDRATE SCH MLS/HR: 50 INJECTION, SOLUTION INTRAVENOUS at 06:11

## 2018-02-28 RX ADMIN — HEPARIN SODIUM SCH UNITS: 5000 INJECTION INTRAVENOUS; SUBCUTANEOUS at 09:00

## 2018-02-28 RX ADMIN — Medication SCH TAB: at 20:25

## 2018-02-28 RX ADMIN — ALBUTEROL SULFATE SCH MG: 2.5 SOLUTION RESPIRATORY (INHALATION) at 19:00

## 2018-02-28 RX ADMIN — ALBUTEROL SULFATE SCH MG: 2.5 SOLUTION RESPIRATORY (INHALATION) at 23:00

## 2018-02-28 RX ADMIN — VANCOMYCIN HCL-SODIUM CHLORIDE IV SOLN 1.25 GM/250ML-0.9% SCH MLS/HR: 1.25-0.9/25 SOLUTION at 10:29

## 2018-02-28 RX ADMIN — METOCLOPRAMIDE HYDROCHLORIDE SCH MG: 5 SOLUTION ORAL at 06:16

## 2018-02-28 RX ADMIN — PANTOPRAZOLE SODIUM SCH MG: 40 INJECTION, POWDER, FOR SOLUTION INTRAVENOUS at 09:02

## 2018-02-28 RX ADMIN — DEXTROSE MONOHYDRATE SCH MLS/HR: 50 INJECTION, SOLUTION INTRAVENOUS at 21:34

## 2018-02-28 RX ADMIN — METOCLOPRAMIDE HYDROCHLORIDE SCH MG: 5 SOLUTION ORAL at 12:11

## 2018-02-28 RX ADMIN — ACETAMINOPHEN PRN MG: 160 SOLUTION ORAL at 01:41

## 2018-02-28 RX ADMIN — ACETAMINOPHEN PRN MG: 160 SOLUTION ORAL at 20:24

## 2018-02-28 NOTE — INFECTIOUS DISEASES PROG NOTE
Assessment/Plan


Problems:  


(1) HCAP (healthcare-associated pneumonia)


Assessment & Plan:  with worsening right side lung infiltrates, suspect 

aspiration, continue  vancomycin and zosyn empiric coverage, pending sputum 

culture , monitor CXR 





(2) UTI (urinary tract infection)


Assessment & Plan:  await  urine culture and continue zosyn 





(3) Sepsis


Assessment & Plan:  due to the above, pending  blood culture. continue  

vancomycin with zosyn empirically 





(4) Fever


Assessment & Plan:  improving , due to the above, continue wide spectrum 

antibiotics, and tylenol as needed 





(5) Acute respiratory failure


Assessment & Plan:  due to the above, continue High flow oxygen via mask, and 

inhalers, monitor CXR 





(6) Diabetes mellitus


Assessment & Plan:  recommend tight glycemic control to keep blood glucose 

between 100-140 








Subjective


ROS Limited/Unobtainable:  Yes


Allergies:  


Coded Allergies:  


     No Known Allergies (Unverified , 3/23/16)


Subjective


he was more awake and alert, and communicative, coughing brownish phlegm , no 

fever today, no SOB, on high flow oxygen





Objective


Vital Signs





Last 24 Hour Vital Signs








  Date Time  Temp Pulse Resp B/P (MAP) Pulse Ox O2 Delivery O2 Flow Rate FiO2


 


2/28/18 12:14  98  121/53    


 


2/28/18 12:00  99      


 


2/28/18 12:00 99.8 114 20 121/53 97 Venturi Mask  50





 99.8       


 


2/28/18 11:14  113 24  96  10.0 50


 


2/28/18 11:05  108 24  95 Venturi Mask 10.0 50


 


2/28/18 08:00 98.7 104 21 96/46 93 Venturi Mask  50





 98.7       


 


2/28/18 08:00  113      


 


2/28/18 07:31      Venturi Mask 12.0 50


 


2/28/18 07:31     95 Venturi Mask 12.0 50


 


2/28/18 07:28  110 24  96  10.0 50


 


2/28/18 07:26  103 26  93 Venturi Mask 10.0 50


 


2/28/18 07:25  103 25   Venturi Mask 12.0 50


 


2/28/18 04:00 98.5 98 24 100/53 96 Venturi Mask  50





 98.5       


 


2/28/18 04:00  90      


 


2/28/18 02:11 99.9       


 


2/28/18 01:41 100.6       


 


2/28/18 00:00  116      


 


2/28/18 00:00 100.6 107 32 108/80 95 Venturi Mask  50





 100.6       


 


2/27/18 20:00  108      


 


2/27/18 20:00 100.0 111 32 114/58 92 Venturi Mask  50





 100.0       


 


2/27/18 15:59  103      


 


2/27/18 15:30 98.7 121 22 137/83 94 Venturi Mask  50





 98.7       


 


2/27/18 14:38 99.5 112 23 100/48 96 Venturi Mask 15.0 50





 99.5       


 


2/27/18 14:33  120      








Height (Feet):  5


Height (Inches):  7.00


Weight (Pounds):  174


General Appearance:  WD/WN, no acute distress


HEENT:  normocephalic, atraumatic, anicteric, mucous membranes moist, PERRL


Respiratory/Chest:  chest wall non-tender, lungs clear, normal breath sounds, 

no respiratory distress, no accessory muscle use, decreased breath sounds


Cardiovascular:  normal peripheral pulses, normal rate, regular rhythm, no 

gallop/murmur, no JVD


Abdomen:  normal bowel sounds, soft, non tender, no organomegaly, non distended

, no mass, no scars


Extremities:  no cyanosis, no clubbing


Skin:  no rash, no lesions, ulcers


Neurologic/Psychiatric:  alert, responsive


Lymphatic:  no neck adenopathy, no groin adenopathy





Microbiology








 Date/Time


Source Procedure


Growth Status


 


 


 2/27/18 06:38


Nasal Nares Influenza Types A,B Antigen (LINO) - Final Complete











Laboratory Tests








Test


  2/28/18


05:00


 


White Blood Count


  5.8 K/UL


(4.8-10.8)


 


Red Blood Count


  3.26 M/UL


(4.70-6.10)  L


 


Hemoglobin


  11.0 G/DL


(14.2-18.0)  L


 


Hematocrit


  31.0 %


(42.0-52.0)  L


 


Mean Corpuscular Volume 95 FL (80-99)  


 


Mean Corpuscular Hemoglobin


  33.8 PG


(27.0-31.0)  H


 


Mean Corpuscular Hemoglobin


Concent 35.6 G/DL


(32.0-36.0)


 


Red Cell Distribution Width


  12.0 %


(11.6-14.8)


 


Platelet Count


  106 K/UL


(150-450)  L


 


Mean Platelet Volume


  7.8 FL


(6.5-10.1)


 


Neutrophils (%) (Auto)


  83.1 %


(45.0-75.0)  H


 


Lymphocytes (%) (Auto)


  8.5 %


(20.0-45.0)  L


 


Monocytes (%) (Auto)


  7.4 %


(1.0-10.0)


 


Eosinophils (%) (Auto)


  0.3 %


(0.0-3.0)


 


Basophils (%) (Auto)


  0.7 %


(0.0-2.0)


 


Sodium Level


  139 MMOL/L


(136-145)


 


Potassium Level


  3.7 MMOL/L


(3.5-5.1)


 


Chloride Level


  105 MMOL/L


()


 


Carbon Dioxide Level


  28 MMOL/L


(21-32)


 


Anion Gap


  6 mmol/L


(5-15)


 


Blood Urea Nitrogen


  16 mg/dL


(7-18)


 


Creatinine


  1.2 MG/DL


(0.55-1.30)


 


Estimat Glomerular Filtration


Rate  mL/min (>60)  


 


 


Glucose Level


  177 MG/DL


()  H


 


Hemoglobin A1c


  7.9 %


(4.3-6.0)  H


 


Uric Acid


  3.3 MG/DL


(2.6-7.2)


 


Calcium Level


  8.5 MG/DL


(8.5-10.1)


 


Phosphorus Level


  2.5 MG/DL


(2.5-4.9)


 


Magnesium Level


  1.5 MG/DL


(1.8-2.4)  L


 


Total Bilirubin


  1.4 MG/DL


(0.2-1.0)  H


 


Direct Bilirubin


  0.2 MG/DL


(0.0-0.3)


 


Gamma Glutamyl Transpeptidase 13 U/L (5-85)  


 


Aspartate Amino Transf


(AST/SGOT) 13 U/L (15-37)


L


 


Alanine Aminotransferase


(ALT/SGPT) 16 U/L (12-78)


 


 


Alkaline Phosphatase


  55 U/L


()


 


Total Creatine Kinase


  52 U/L


()


 


Troponin I


  0.000 ng/mL


(0.000-0.056)


 


C-Reactive Protein,


Quantitative 20.0 mg/dL


(0.00-0.90)  H


 


Pro-B-Type Natriuretic Peptide


  2366 pg/mL


(0-125)  H


 


Total Protein


  5.6 G/DL


(6.4-8.2)  L


 


Albumin


  2.2 G/DL


(3.4-5.0)  L


 


Globulin 3.4 g/dL  


 


Albumin/Globulin Ratio


  0.6 (1.0-2.7)


L


 


Triglycerides Level


  31 MG/DL


()


 


Cholesterol Level


  < 50 MG/DL (<


200)


 


LDL Cholesterol


  22 mg/dL


(<100)


 


HDL Cholesterol


  29 MG/DL


(40-60)  L


 


Cholesterol/HDL Ratio


  1.7 (3.3-4.4)


L


 


Thyroid Stimulating Hormone


(TSH) 0.820 uiU/mL


(0.358-3.740)











Current Medications








 Medications


  (Trade)  Dose


 Ordered  Sig/Shasta


 Route


 PRN Reason  Start Time


 Stop Time Status Last Admin


Dose Admin


 


 Acetaminophen


  (Tylenol)  650 mg  EVERY 6 HOURS  PRN


 GT


 Mild Pain/Temp > 100.5  2/28/18 01:15


 3/30/18 01:14  2/28/18 01:41


 


 


 Albuterol Sulfate


  (Proventil)  2.5 mg  Q4HRT


 HHN


   2/28/18 15:00


 3/5/18 14:59   


 


 


 Carvedilol


  (Coreg)  3.125 mg  EVERY 12  HOURS


 GT


   2/28/18 21:00


 3/30/18 20:59   


 


 


 Famotidine


  (Pepcid)  20 mg  BID


 GT


   2/28/18 12:00


 3/30/18 11:59  2/28/18 12:14


 


 


 Heparin Sodium


  (Porcine)


  (Heparin 5000


 units/ml)  5,000 units  EVERY 12  HOURS


 SUBQ


   2/27/18 21:00


 3/29/18 20:59   


 


 


 Magnesium Sulfate  100 ml @ 


 100 mls/hr  Q1H


 IVPB


   2/28/18 13:00


 2/28/18 14:59  2/28/18 13:53


 


 


 Metoclopramide HCl


  (Reglan)  5 mg  EVERY 6  HOURS


 GT


   2/27/18 18:00


 3/29/18 17:59  2/28/18 12:11


 


 


 Piperacillin Sod/


 Tazobactam Sod


 3.375 gm/Sodium


 Chloride  110 ml @ 


 27.5 mls/hr  Q8HR


 IVPB


   2/27/18 15:00


 3/6/18 14:59  2/28/18 13:53


 


 


 Potassium


 Phosphate 20 mm/


 Sodium Chloride  281.6667


 ml @ 


 46.944 m...  ONCE  ONCE


 IV


   2/28/18 14:00


 2/28/18 19:59  2/28/18 13:54


 


 


 Promethazine HCl/


 Codeine


  (Phenergan with


 Codeine)  5 ml  Q4H  PRN


 ORAL


 For Cough  2/27/18 21:30


 3/29/18 21:29   


 


 


 Sennosides


  (Senokot)  2 tab  BEDTIME


 GT


   2/27/18 21:00


 3/29/18 20:59  2/27/18 21:55


 


 


 Sitagliptin


 Phosphate


  (Januvia)  50 mg  DAILY


 GT


   2/28/18 09:00


 3/30/18 08:59  2/28/18 09:02


 


 


 Vancomycin HCl


  (Vanco rx to


 dose)  1 ea  DAILY  PRN


 MISC


 Per rx protocol  2/27/18 14:00


 3/29/18 13:59   


 


 


 Vancomycin HCl/


 Dextrose  250 ml @ 


 166.667


 mls/hr  Q24H


 IVPB


   2/28/18 10:00


 3/5/18 09:59  2/28/18 10:29


 

















Amira Gross M.D. Feb 28, 2018 14:35

## 2018-02-28 NOTE — GENERAL PROGRESS NOTE
Assessment/Plan


Problem List:  


(1) Pneumonia


ICD Codes:  J18.9 - Pneumonia, unspecified organism


SNOMED:  357644779


(2) Functional quadriplegia


ICD Codes:  R53.2 - Functional quadriplegia


SNOMED:  472160807948164


(3) Diabetes mellitus


ICD Codes:  E11.9 - Type 2 diabetes mellitus without complications


SNOMED:  52549726


(4) UTI (urinary tract infection)


ICD Codes:  N39.0 - Urinary tract infection, site not specified


SNOMED:  33013524


(5) Respiratory distress


ICD Codes:  R06.03 - Acute respiratory distress


SNOMED:  808785033


Status:  unchanged


Status Narrative


aspiration pneumonia- respiratory distress


vomiting


?UTI


DM


GT feeding


functional Quadriplegia


Assessment/Plan





antibiotics-


breathing treatment


start feeding Gt


low dose lasix


2D echo





Subjective


ROS Limited/Unobtainable:  No


Constitutional:  Reports: malaise, weakness


Respiratory:  Reports: cough


Allergies:  


Coded Allergies:  


     No Known Allergies (Unverified , 3/23/16)





Objective





Last 24 Hour Vital Signs








  Date Time  Temp Pulse Resp B/P (MAP) Pulse Ox O2 Delivery O2 Flow Rate FiO2


 


2/28/18 11:14  113 24  96  10.0 50


 


2/28/18 11:05  108 24  95 Venturi Mask 10.0 50


 


2/28/18 08:00 98.7 104 21 96/46 93 Venturi Mask  50





 98.7       


 


2/28/18 08:00  113      


 


2/28/18 07:31      Venturi Mask 10.0 50


 


2/28/18 07:31     95 Venturi Mask 10.0 50


 


2/28/18 07:28  110 24  96  10.0 50


 


2/28/18 07:26  103 26  93 Venturi Mask 10.0 50


 


2/28/18 04:00 98.5 98 24 100/53 96 Venturi Mask  50





 98.5       


 


2/28/18 04:00  90      


 


2/28/18 02:11 99.9       


 


2/28/18 01:41 100.6       


 


2/28/18 00:00  116      


 


2/28/18 00:00 100.6 107 32 108/80 95 Venturi Mask  50





 100.6       


 


2/27/18 20:00  108      


 


2/27/18 20:00 100.0 111 32 114/58 92 Venturi Mask  50





 100.0       


 


2/27/18 15:59  103      


 


2/27/18 15:30 98.7 121 22 137/83 94 Venturi Mask  50





 98.7       


 


2/27/18 14:38 99.5 112 23 100/48 96 Venturi Mask 15.0 50





 99.5       


 


2/27/18 14:33  120      


 


2/27/18 13:36 99.5 112 23 100/48 96 Venturi Mask 15.0 50





 99.5       

















Intake and Output  


 


 2/27/18 2/28/18





 19:00 07:00


 


Intake Total 75 ml 1110.5 ml


 


Output Total 525 ml 500 ml


 


Balance -450 ml 610.5 ml


 


  


 


IV Total 75 ml 1010.5 ml


 


Other  100 ml


 


Output Urine Total 525 ml 500 ml


 


# Bowel Movements 3 2








Laboratory Tests


2/28/18 05:00: 


White Blood Count 5.8, Red Blood Count 3.26L, Hemoglobin 11.0L, Hematocrit 31.0L

, Mean Corpuscular Volume 95, Mean Corpuscular Hemoglobin 33.8H, Mean 

Corpuscular Hemoglobin Concent 35.6, Red Cell Distribution Width 12.0, Platelet 

Count 106L, Mean Platelet Volume 7.8, Neutrophils (%) (Auto) 83.1H, Lymphocytes 

(%) (Auto) 8.5L, Monocytes (%) (Auto) 7.4, Eosinophils (%) (Auto) 0.3, 

Basophils (%) (Auto) 0.7, Sodium Level 139, Potassium Level 3.7, Chloride Level 

105, Carbon Dioxide Level 28, Anion Gap 6, Blood Urea Nitrogen 16, Creatinine 

1.2, Estimat Glomerular Filtration Rate , Glucose Level 177H, Hemoglobin A1c 

7.9H, Uric Acid 3.3, Calcium Level 8.5, Phosphorus Level 2.5, Magnesium Level 

1.5L, Total Bilirubin 1.4H, Direct Bilirubin 0.2, Gamma Glutamyl Transpeptidase 

13, Aspartate Amino Transf (AST/SGOT) 13L, Alanine Aminotransferase (ALT/SGPT) 

16, Alkaline Phosphatase 55, Total Creatine Kinase 52, Troponin I 0.000, Pro-B-

Type Natriuretic Peptide 2366H, Total Protein 5.6L, Albumin 2.2L, Globulin 3.4, 

Albumin/Globulin Ratio 0.6L, Triglycerides Level 31, Cholesterol Level < 50, 

LDL Cholesterol 22, HDL Cholesterol 29L, Cholesterol/HDL Ratio 1.7L, Thyroid 

Stimulating Hormone (TSH) 0.820


Height (Feet):  5


Height (Inches):  7.00


Weight (Pounds):  174


General Appearance:  mild distress


Cardiovascular:  tachycardia


Respiratory/Chest:  decreased breath sounds, rhonchi - bilaterally


Abdomen:  soft, other - PEG


Genitourinary/Rectal:  other - agarwal


Neurologic:  disoriented











NAY CALDWELL Feb 28, 2018 11:47

## 2018-02-28 NOTE — CARDIOLOGY REPORT
--------------- APPROVED REPORT --------------





EXAM: Two-dimensional and M-mode echocardiogram with Doppler and color 

Doppler.



INDICATION

Congestive Heart Failure 



M-Mode DIMENSIONS 

IVSd0.9 (0.7-1.1cm)Left Atrium (MM)3.0 (1.6-4.0cm)

LVDd3.5 (3.5-5.6cm)Aortic Root2.9 (2.0-3.7cm)

PWd1.0 (0.7-1.1cm)Aortic Cusp Exc.2.0 (1.5-2.0cm)



LVDs2.0 (2.5-4.0cm)

PWs1.2 cm





Normal left ventricular chamber size, systolic function and wall motion.

Left ventricular ejection fraction estimated to be 60-65%.

No evidence of  left ventricular hypertrophy.

No evidence of pericardial or pleural effusion.

Right cardiac chamber sizes are within normal limits.

Mild left atrial enlargement by 2D.

Focal aortic valve sclerosis with adequate cusp excursion.

Thickened mitral valve leaflets with normal excursion.

Mild mitral annulus and aortic root calcification.

Pulmonic valve not well visualized.

Normal tricuspid valve structure.

IVC is not obtainable due to GI tube.



A  color flow and spectral Doppler study was performed and revealed:

No aortic regurgitation.

No mitral regurgitation.

Mitral diastolic velocities suggest reduced left ventricular relaxation c/w diastolic 

dysfunction grade 1.

No tricuspid regurgitation.

Pulmonic regurgitation present.

## 2018-02-28 NOTE — DIAGNOSTIC IMAGING REPORT
Indication: Dyspnea

 

Technique: One view of the chest

 

Comparison: 2/27/2018

 

Findings: There is increased atelectasis and possible consolidation at the right lung

base. Possibly a small amount of pleural fluid as well. Mild generalized interstitial

prominence in the right upper lobe again demonstrated. Central bronchial wall

thickening again demonstrated bilaterally. The left lung and pleural space are

otherwise clear.

 

Impression: Increasing right basilar consolidation and atelectasis, possible helping

small right pleural effusion

 

Other findings as noted

## 2018-03-01 VITALS — SYSTOLIC BLOOD PRESSURE: 151 MMHG | DIASTOLIC BLOOD PRESSURE: 68 MMHG

## 2018-03-01 VITALS — SYSTOLIC BLOOD PRESSURE: 100 MMHG | DIASTOLIC BLOOD PRESSURE: 50 MMHG

## 2018-03-01 VITALS — SYSTOLIC BLOOD PRESSURE: 113 MMHG | DIASTOLIC BLOOD PRESSURE: 52 MMHG

## 2018-03-01 VITALS — DIASTOLIC BLOOD PRESSURE: 65 MMHG | SYSTOLIC BLOOD PRESSURE: 141 MMHG

## 2018-03-01 VITALS — DIASTOLIC BLOOD PRESSURE: 71 MMHG | SYSTOLIC BLOOD PRESSURE: 130 MMHG

## 2018-03-01 VITALS — SYSTOLIC BLOOD PRESSURE: 120 MMHG | DIASTOLIC BLOOD PRESSURE: 60 MMHG

## 2018-03-01 LAB
ADD MANUAL DIFF: YES
ALBUMIN SERPL-MCNC: 2 G/DL (ref 3.4–5)
ALBUMIN/GLOB SERPL: 0.5 {RATIO} (ref 1–2.7)
ALP SERPL-CCNC: 66 U/L (ref 46–116)
ALT SERPL-CCNC: 15 U/L (ref 12–78)
ANION GAP SERPL CALC-SCNC: 5 MMOL/L (ref 5–15)
AST SERPL-CCNC: 13 U/L (ref 15–37)
BILIRUB DIRECT SERPL-MCNC: 0.3 MG/DL (ref 0–0.3)
BILIRUB SERPL-MCNC: 1.2 MG/DL (ref 0.2–1)
BUN SERPL-MCNC: 16 MG/DL (ref 7–18)
CALCIUM SERPL-MCNC: 8.3 MG/DL (ref 8.5–10.1)
CHLORIDE SERPL-SCNC: 105 MMOL/L (ref 98–107)
CO2 SERPL-SCNC: 28 MMOL/L (ref 21–32)
CREAT SERPL-MCNC: 1 MG/DL (ref 0.55–1.3)
ERYTHROCYTE [DISTWIDTH] IN BLOOD BY AUTOMATED COUNT: 11.9 % (ref 11.6–14.8)
GLOBULIN SER-MCNC: 3.8 G/DL
HCT VFR BLD CALC: 34 % (ref 42–52)
HGB BLD-MCNC: 11.9 G/DL (ref 14.2–18)
MCV RBC AUTO: 94 FL (ref 80–99)
PHOSPHATE SERPL-MCNC: 1.8 MG/DL (ref 2.5–4.9)
PLATELET # BLD: 100 K/UL (ref 150–450)
POTASSIUM SERPL-SCNC: 3.8 MMOL/L (ref 3.5–5.1)
RBC # BLD AUTO: 3.6 M/UL (ref 4.7–6.1)
SODIUM SERPL-SCNC: 138 MMOL/L (ref 136–145)
WBC # BLD AUTO: 7.3 K/UL (ref 4.8–10.8)

## 2018-03-01 RX ADMIN — HEPARIN SODIUM SCH UNITS: 5000 INJECTION INTRAVENOUS; SUBCUTANEOUS at 08:54

## 2018-03-01 RX ADMIN — ALBUTEROL SULFATE SCH MG: 2.5 SOLUTION RESPIRATORY (INHALATION) at 19:16

## 2018-03-01 RX ADMIN — CARVEDILOL SCH MG: 6.25 TABLET, FILM COATED ORAL at 20:23

## 2018-03-01 RX ADMIN — DEXTROSE MONOHYDRATE SCH MLS/HR: 50 INJECTION, SOLUTION INTRAVENOUS at 20:31

## 2018-03-01 RX ADMIN — THEOPHYLLINE ANHYDROUS SCH MG: 100 CAPSULE, EXTENDED RELEASE ORAL at 09:03

## 2018-03-01 RX ADMIN — ACETAMINOPHEN PRN MG: 160 SOLUTION ORAL at 06:27

## 2018-03-01 RX ADMIN — METOCLOPRAMIDE HYDROCHLORIDE SCH MG: 5 SOLUTION ORAL at 06:19

## 2018-03-01 RX ADMIN — ALBUTEROL SULFATE SCH MG: 2.5 SOLUTION RESPIRATORY (INHALATION) at 10:49

## 2018-03-01 RX ADMIN — ALBUTEROL SULFATE SCH MG: 2.5 SOLUTION RESPIRATORY (INHALATION) at 03:00

## 2018-03-01 RX ADMIN — Medication SCH TAB: at 20:23

## 2018-03-01 RX ADMIN — METOCLOPRAMIDE HYDROCHLORIDE SCH MG: 5 SOLUTION ORAL at 12:50

## 2018-03-01 RX ADMIN — DEXTROSE MONOHYDRATE SCH MLS/HR: 50 INJECTION, SOLUTION INTRAVENOUS at 06:17

## 2018-03-01 RX ADMIN — SITAGLIPTIN SCH MG: 50 TABLET, FILM COATED ORAL at 09:03

## 2018-03-01 RX ADMIN — ACETAMINOPHEN PRN MG: 160 SOLUTION ORAL at 20:19

## 2018-03-01 RX ADMIN — HEPARIN SODIUM SCH UNITS: 5000 INJECTION INTRAVENOUS; SUBCUTANEOUS at 21:00

## 2018-03-01 RX ADMIN — METOCLOPRAMIDE HYDROCHLORIDE SCH MG: 5 SOLUTION ORAL at 17:48

## 2018-03-01 RX ADMIN — METOCLOPRAMIDE HYDROCHLORIDE SCH MG: 5 SOLUTION ORAL at 00:04

## 2018-03-01 RX ADMIN — THEOPHYLLINE ANHYDROUS SCH MG: 100 CAPSULE, EXTENDED RELEASE ORAL at 20:20

## 2018-03-01 RX ADMIN — DEXTROSE MONOHYDRATE SCH MLS/HR: 50 INJECTION, SOLUTION INTRAVENOUS at 14:37

## 2018-03-01 RX ADMIN — VANCOMYCIN HCL-SODIUM CHLORIDE IV SOLN 1.25 GM/250ML-0.9% SCH MLS/HR: 1.25-0.9/25 SOLUTION at 10:36

## 2018-03-01 RX ADMIN — ALBUTEROL SULFATE SCH MG: 2.5 SOLUTION RESPIRATORY (INHALATION) at 07:14

## 2018-03-01 RX ADMIN — ALBUTEROL SULFATE SCH MG: 2.5 SOLUTION RESPIRATORY (INHALATION) at 22:43

## 2018-03-01 RX ADMIN — ALBUTEROL SULFATE SCH MG: 2.5 SOLUTION RESPIRATORY (INHALATION) at 15:18

## 2018-03-01 NOTE — GENERAL PROGRESS NOTE
Assessment/Plan


Problem List:  


(1) Pneumonia


ICD Codes:  J18.9 - Pneumonia, unspecified organism


SNOMED:  038989780


(2) Functional quadriplegia


ICD Codes:  R53.2 - Functional quadriplegia


SNOMED:  641050023862430


(3) Diabetes mellitus


ICD Codes:  E11.9 - Type 2 diabetes mellitus without complications


SNOMED:  05259166


(4) UTI (urinary tract infection)


ICD Codes:  N39.0 - Urinary tract infection, site not specified


SNOMED:  35378346


(5) Respiratory distress


ICD Codes:  R06.03 - Acute respiratory distress


SNOMED:  074079397


Status:  stable


Assessment/Plan





lasix IV


K Phos supplement


increase coreg and 


antibiotics-


breathing treatment


start feeding Gt


increase Januvia


2D echo


Left ventricular ejection fraction estimated to be 60-65%.


No evidence of  left ventricular hypertrophy.





Subjective


ROS Limited/Unobtainable:  No


Constitutional:  Reports: malaise


Allergies:  


Coded Allergies:  


     No Known Allergies (Unverified , 3/23/16)





Objective





Last 24 Hour Vital Signs








  Date Time  Temp Pulse Resp B/P (MAP) Pulse Ox O2 Delivery O2 Flow Rate FiO2


 


3/1/18 15:18  100 20  95 Nasal Cannula 2.0 28


 


3/1/18 12:00 98.1 100 22 120/60 92 Venturi Mask  50





 98.1       


 


3/1/18 12:00  95      


 


3/1/18 11:21  102 20  95 Venturi Mask 4.0 30


 


3/1/18 10:49  89 20  95 Venturi Mask 4.0 30


 


3/1/18 09:03  107  113/52    


 


3/1/18 08:00  108      


 


3/1/18 08:00 98.6 107 20 113/52 92 Venturi Mask  50





 98.6       


 


3/1/18 07:28  113 20  94 Venturi Mask 4.0 30


 


3/1/18 07:28      Venturi Mask 4.0 30


 


3/1/18 07:27     93 Venturi Mask 4.0 30


 


3/1/18 07:14  100 20  95 Venturi Mask 12.0 50


 


3/1/18 06:57 99.0       


 


3/1/18 06:27 100.9       


 


3/1/18 06:24 100.9 110   99 Venturi Mask  50





 100.9       


 


3/1/18 04:00  116      


 


3/1/18 04:00 98.7 115 28 130/71 96 Venturi Mask  50





 98.7       


 


3/1/18 03:30      Venturi Mask  


 


3/1/18 03:30      Venturi Mask  


 


3/1/18 00:17  91      


 


3/1/18 00:00 98.3 86 20 100/50 97 Venturi Mask  50





 98.3       


 


2/28/18 23:30      Venturi Mask  


 


2/28/18 23:30      Venturi Mask  


 


2/28/18 20:24  98  114/56    


 


2/28/18 20:24 101.2       


 


2/28/18 20:00 101.2 98 22 114/56 97 Venturi Mask  50





 101.2       


 


2/28/18 19:44  104      


 


2/28/18 19:30     96 Venturi Mask 12.0 50


 


2/28/18 19:30      Venturi Mask 12.0 50


 


2/28/18 19:30      Venturi Mask  


 


2/28/18 19:30      Venturi Mask 12.0 50


 


2/28/18 16:00 99.2 98 20 112/51 98 Venturi Mask  50





 99.2       


 


2/28/18 16:00  95      

















Intake and Output  


 


 2/28/18 3/1/18





 19:00 07:00


 


Intake Total 1182.220 ml 616.944 ml


 


Output Total 900 ml 650 ml


 


Balance 282.220 ml -33.056 ml


 


  


 


Free Water 100 ml 


 


IV Total 902.220 ml 156.944 ml


 


Tube Feeding 180 ml 360 ml


 


Other  100 ml


 


Output Urine Total 900 ml 650 ml


 


# Voids  1


 


# Bowel Movements 3 








Laboratory Tests


3/1/18 05:50: 


White Blood Count 7.3, Red Blood Count 3.60L, Hemoglobin 11.9L, Hematocrit 34.0L

, Mean Corpuscular Volume 94, Mean Corpuscular Hemoglobin 32.9H, Mean 

Corpuscular Hemoglobin Concent 34.9, Red Cell Distribution Width 11.9, Platelet 

Count 100L, Mean Platelet Volume 7.1, Neutrophils (%) (Auto) , Lymphocytes (%) (

Auto) , Monocytes (%) (Auto) , Eosinophils (%) (Auto) , Basophils (%) (Auto) , 

Differential Total Cells Counted 100, Neutrophils % (Manual) 82H, Lymphocytes % 

(Manual) 11L, Monocytes % (Manual) 3, Eosinophils % (Manual) 4H, Basophils % (

Manual) 0, Band Neutrophils 0, Platelet Estimate DecreasedL, Platelet 

Morphology Normal, Red Blood Cell Morphology Normal, Sodium Level 138, 

Potassium Level 3.8, Chloride Level 105, Carbon Dioxide Level 28, Anion Gap 5, 

Blood Urea Nitrogen 16, Creatinine 1.0, Estimat Glomerular Filtration Rate , 

Glucose Level 213H, Uric Acid 2.5L, Calcium Level 8.3L, Phosphorus Level 1.8L, 

Magnesium Level 1.8, Total Bilirubin 1.2H, Direct Bilirubin 0.3, Aspartate 

Amino Transf (AST/SGOT) 13L, Alanine Aminotransferase (ALT/SGPT) 15, Alkaline 

Phosphatase 66, Pro-B-Type Natriuretic Peptide 1077H, Total Protein 5.8L, 

Albumin 2.0L, Globulin 3.8, Albumin/Globulin Ratio 0.5L


Height (Feet):  5


Height (Inches):  7.00


Weight (Pounds):  174


General Appearance:  no apparent distress


Cardiovascular:  tachycardia


Respiratory/Chest:  decreased breath sounds


Abdomen:  soft











NAY CALDWELL Mar 1, 2018 15:35

## 2018-03-01 NOTE — INFECTIOUS DISEASES PROG NOTE
Assessment/Plan


Problems:  


(1) HCAP (healthcare-associated pneumonia)


Assessment & Plan:  with worsening right side lung infiltrates, suspect 

aspiration, continue  vancomycin and zosyn empiric coverage, pending sputum 

culture , monitor CXR 





(2) UTI (urinary tract infection)


Assessment & Plan:  await  urine culture and continue zosyn 





(3) Sepsis


Assessment & Plan:  due to the above, pending  blood culture. continue  

vancomycin with zosyn empirically 





(4) Fever


Assessment & Plan:  improving , due to the above, continue wide spectrum 

antibiotics, and tylenol as needed 





(5) Acute respiratory failure


Assessment & Plan:  due to the above, continue High flow oxygen via mask, and 

inhalers, monitor CXR 





(6) Diabetes mellitus


Assessment & Plan:  recommend tight glycemic control to keep blood glucose 

between 100-140 








Subjective


Constitutional:  Reports: no symptoms


HEENT:  Reports: no symptoms


Respiratory:  Reports: productive cough


Breasts:  Reports: no symptoms


Cardiovascular:  Reports: no symptoms


Gastrointestinal/Abdominal:  Reports: no symptoms


Genitourinary:  Reports: no symptoms


Neurologic:  Reports: no symptoms


Psychiatric:  Reports: no symptoms


Skin:  Reports: no symptoms


Endocrine:  Reports: no symptoms


Hematologic:  Reports: no symptoms


Musculoskeletal:  Reports: no symptoms


Allergies:  


Coded Allergies:  


     No Known Allergies (Unverified , 3/23/16)


Subjective


he was more awake and alert, and communicative, coughing brownish phlegm , no 

fever today, no SOB, on high flow oxygen





Objective


Vital Signs





Last 24 Hour Vital Signs








  Date Time  Temp Pulse Resp B/P (MAP) Pulse Ox O2 Delivery O2 Flow Rate FiO2


 


3/1/18 12:00 98.1 100 22 120/60 92 Venturi Mask  50





 98.1       


 


3/1/18 12:00  95      


 


3/1/18 11:21  102 20  95 Venturi Mask 4.0 30


 


3/1/18 10:49  89 20  95 Venturi Mask 4.0 30


 


3/1/18 09:03  107  113/52    


 


3/1/18 08:00  108      


 


3/1/18 08:00 98.6 107 20 113/52 92 Venturi Mask  50





 98.6       


 


3/1/18 07:28  113 20  94 Venturi Mask 4.0 30


 


3/1/18 07:28      Venturi Mask 4.0 30


 


3/1/18 07:27     93 Venturi Mask 4.0 30


 


3/1/18 07:14  100 20  95 Venturi Mask 12.0 50


 


3/1/18 06:57 99.0       


 


3/1/18 06:27 100.9       


 


3/1/18 06:24 100.9 110   99 Venturi Mask  50





 100.9       


 


3/1/18 04:00  116      


 


3/1/18 04:00 98.7 115 28 130/71 96 Venturi Mask  50





 98.7       


 


3/1/18 03:30      Venturi Mask  


 


3/1/18 03:30      Venturi Mask  


 


3/1/18 00:17  91      


 


3/1/18 00:00 98.3 86 20 100/50 97 Venturi Mask  50





 98.3       


 


2/28/18 23:30      Venturi Mask  


 


2/28/18 23:30      Venturi Mask  


 


2/28/18 20:24  98  114/56    


 


2/28/18 20:24 101.2       


 


2/28/18 20:00 101.2 98 22 114/56 97 Venturi Mask  50





 101.2       


 


2/28/18 19:44  104      


 


2/28/18 19:30     96 Venturi Mask 12.0 50


 


2/28/18 19:30      Venturi Mask 12.0 50


 


2/28/18 19:30      Venturi Mask  


 


2/28/18 19:30      Venturi Mask 12.0 50


 


2/28/18 16:00 99.2 98 20 112/51 98 Venturi Mask  50





 99.2       


 


2/28/18 16:00  95      


 


2/28/18 15:01  99 23  96 Venturi Mask 12.0 50


 


2/28/18 14:49  94 23  97 Venturi Mask 12.0 50








Height (Feet):  5


Height (Inches):  7.00


Weight (Pounds):  174


HEENT:  normocephalic, atraumatic, anicteric, mucous membranes moist, PERRL


Respiratory/Chest:  chest wall non-tender, lungs clear, normal breath sounds, 

no respiratory distress, no accessory muscle use


Cardiovascular:  normal peripheral pulses, normal rate, regular rhythm, no 

gallop/murmur, no JVD


Abdomen:  normal bowel sounds, soft, non tender, no organomegaly, non distended

, no mass, no scars


Extremities:  no cyanosis, no clubbing


Skin:  no rash, no lesions


Neurologic/Psychiatric:  alert, responsive





Microbiology








 Date/Time


Source Procedure


Growth Status


 


 


 2/27/18 06:40


Blood Blood Culture - Preliminary


NO GROWTH AFTER 48 HOURS Resulted


 


 2/27/18 06:25


Blood Blood Culture - Preliminary


NO GROWTH AFTER 48 HOURS Resulted





 2/28/18 01:00


Sputum Gram Stain - Final Resulted


 


 2/28/18 01:00


Sputum Sputum Culture


Pending Resulted


 


 2/27/18 06:38


Nasal Nares Influenza Types A,B Antigen (LINO) - Final Complete











Laboratory Tests








Test


  3/1/18


05:50


 


White Blood Count


  7.3 K/UL


(4.8-10.8)


 


Red Blood Count


  3.60 M/UL


(4.70-6.10)  L


 


Hemoglobin


  11.9 G/DL


(14.2-18.0)  L


 


Hematocrit


  34.0 %


(42.0-52.0)  L


 


Mean Corpuscular Volume 94 FL (80-99)  


 


Mean Corpuscular Hemoglobin


  32.9 PG


(27.0-31.0)  H


 


Mean Corpuscular Hemoglobin


Concent 34.9 G/DL


(32.0-36.0)


 


Red Cell Distribution Width


  11.9 %


(11.6-14.8)


 


Platelet Count


  100 K/UL


(150-450)  L


 


Mean Platelet Volume


  7.1 FL


(6.5-10.1)


 


Neutrophils (%) (Auto)


  % (45.0-75.0)


 


 


Lymphocytes (%) (Auto)


  % (20.0-45.0)


 


 


Monocytes (%) (Auto)  % (1.0-10.0)  


 


Eosinophils (%) (Auto)  % (0.0-3.0)  


 


Basophils (%) (Auto)  % (0.0-2.0)  


 


Differential Total Cells


Counted 100  


 


 


Neutrophils % (Manual) 82 % (45-75)  H


 


Lymphocytes % (Manual) 11 % (20-45)  L


 


Monocytes % (Manual) 3 % (1-10)  


 


Eosinophils % (Manual) 4 % (0-3)  H


 


Basophils % (Manual) 0 % (0-2)  


 


Band Neutrophils 0 % (0-8)  


 


Platelet Estimate Decreased  L


 


Platelet Morphology Normal  


 


Red Blood Cell Morphology Normal  


 


Sodium Level


  138 MMOL/L


(136-145)


 


Potassium Level


  3.8 MMOL/L


(3.5-5.1)


 


Chloride Level


  105 MMOL/L


()


 


Carbon Dioxide Level


  28 MMOL/L


(21-32)


 


Anion Gap


  5 mmol/L


(5-15)


 


Blood Urea Nitrogen


  16 mg/dL


(7-18)


 


Creatinine


  1.0 MG/DL


(0.55-1.30)


 


Estimat Glomerular Filtration


Rate  mL/min (>60)  


 


 


Glucose Level


  213 MG/DL


()  H


 


Uric Acid


  2.5 MG/DL


(2.6-7.2)  L


 


Calcium Level


  8.3 MG/DL


(8.5-10.1)  L


 


Phosphorus Level


  1.8 MG/DL


(2.5-4.9)  L


 


Magnesium Level


  1.8 MG/DL


(1.8-2.4)


 


Total Bilirubin


  1.2 MG/DL


(0.2-1.0)  H


 


Direct Bilirubin


  0.3 MG/DL


(0.0-0.3)


 


Aspartate Amino Transf


(AST/SGOT) 13 U/L (15-37)


L


 


Alanine Aminotransferase


(ALT/SGPT) 15 U/L (12-78)


 


 


Alkaline Phosphatase


  66 U/L


()


 


Pro-B-Type Natriuretic Peptide


  1077 pg/mL


(0-125)  H


 


Total Protein


  5.8 G/DL


(6.4-8.2)  L


 


Albumin


  2.0 G/DL


(3.4-5.0)  L


 


Globulin 3.8 g/dL  


 


Albumin/Globulin Ratio


  0.5 (1.0-2.7)


L











Current Medications








 Medications


  (Trade)  Dose


 Ordered  Sig/Shasta


 Route


 PRN Reason  Start Time


 Stop Time Status Last Admin


Dose Admin


 


 Acetaminophen


  (Tylenol)  650 mg  EVERY 6 HOURS  PRN


 GT


 Mild Pain/Temp > 100.5  2/28/18 01:15


 3/30/18 01:14  3/1/18 06:27


 


 


 Albuterol Sulfate


  (Proventil)  2.5 mg  Q4HRT


 HHN


   2/28/18 15:00


 3/5/18 14:59  3/1/18 10:49


 


 


 Carvedilol


  (Coreg)  3.125 mg  EVERY 12  HOURS


 GT


   2/28/18 21:00


 3/30/18 20:59  3/1/18 09:03


 


 


 Famotidine


  (Pepcid)  20 mg  BID


 GT


   2/28/18 12:00


 3/30/18 11:59  3/1/18 09:03


 


 


 Heparin Sodium


  (Porcine)


  (Heparin 5000


 units/ml)  5,000 units  EVERY 12  HOURS


 SUBQ


   2/27/18 21:00


 3/29/18 20:59   


 


 


 Metoclopramide HCl


  (Reglan)  5 mg  EVERY 6  HOURS


 GT


   2/27/18 18:00


 3/29/18 17:59  3/1/18 12:50


 


 


 Piperacillin Sod/


 Tazobactam Sod


 3.375 gm/Sodium


 Chloride  110 ml @ 


 27.5 mls/hr  Q8HR


 IVPB


   2/27/18 15:00


 3/6/18 14:59  3/1/18 14:37


 


 


 Potassium


 Phosphate 30 mm/


 Sodium Chloride  285 ml @ 


 47.5 mls/hr  ONCE  ONCE


 IV


   3/1/18 12:00


 3/1/18 17:59  3/1/18 12:52


 


 


 Promethazine HCl/


 Codeine


  (Phenergan with


 Codeine)  5 ml  Q4H  PRN


 ORAL


 For Cough  3/1/18 09:00


 3/31/18 08:59   


 


 


 Sennosides


  (Senokot)  2 tab  BEDTIME


 GT


   2/27/18 21:00


 3/29/18 20:59  2/28/18 20:25


 


 


 Sitagliptin


 Phosphate


  (Januvia)  50 mg  DAILY


 GT


   2/28/18 09:00


 3/30/18 08:59  3/1/18 09:03


 


 


 Theophylline


  (Dani-Dur)  100 mg  EVERY 12  HOURS


 ORAL


   3/1/18 09:00


 3/31/18 08:59  3/1/18 09:03


 


 


 Vancomycin HCl


  (Vanco rx to


 dose)  1 ea  DAILY  PRN


 MISC


 Per rx protocol  2/27/18 14:00


 3/29/18 13:59   


 


 


 Vancomycin HCl/


 Dextrose  250 ml @ 


 166.667


 mls/hr  Q24H


 IVPB


   2/28/18 10:00


 3/5/18 09:59  3/1/18 10:36


 

















Amira Gross M.D. Mar 1, 2018 14:43

## 2018-03-01 NOTE — PULMONOLOGY PROGRESS NOTE
Assessment/Plan


Problems:  


(1) Respiratory distress


(2) Pneumonia


(3) HCAP (healthcare-associated pneumonia)


(4) Sepsis


(5) Diabetes mellitus


(6) Feeding by G-tube


(7) Functional quadriplegia


Assessment/Plan


chest pt


iv abx


respiratory treatment


check sputum


titrate fio2


chest pt


anttussives.





Subjective


ROS Limited/Unobtainable:  No


Interval Events:  on face mask, looks dyspnic


Constitutional:  Reports: no symptoms


HEENT:  Repors: no symptoms


Allergies:  


Coded Allergies:  


     No Known Allergies (Unverified , 3/23/16)





Objective





Last 24 Hour Vital Signs








  Date Time  Temp Pulse Resp B/P (MAP) Pulse Ox O2 Delivery O2 Flow Rate FiO2


 


3/1/18 07:28  113 20  94 Venturi Mask 4.0 30


 


3/1/18 07:28      Venturi Mask 4.0 30


 


3/1/18 07:27     93 Venturi Mask 4.0 30


 


3/1/18 07:14  100 20  95 Venturi Mask 12.0 50


 


3/1/18 06:57 99.0       


 


3/1/18 06:27 100.9       


 


3/1/18 06:24 100.9 110   99 Venturi Mask  50





 100.9       


 


3/1/18 04:00  116      


 


3/1/18 04:00 98.7 115 28 130/71 96 Venturi Mask  50





 98.7       


 


3/1/18 03:30      Venturi Mask  


 


3/1/18 03:30      Venturi Mask  


 


3/1/18 00:17  91      


 


3/1/18 00:00 98.3 86 20 100/50 97 Venturi Mask  50





 98.3       


 


2/28/18 23:30      Venturi Mask  


 


2/28/18 23:30      Venturi Mask  


 


2/28/18 20:24  98  114/56    


 


2/28/18 20:24 101.2       


 


2/28/18 20:00 101.2 98 22 114/56 97 Venturi Mask  50





 101.2       


 


2/28/18 19:44  104      


 


2/28/18 19:30     96 Venturi Mask 12.0 50


 


2/28/18 19:30      Venturi Mask 12.0 50


 


2/28/18 19:30      Venturi Mask  


 


2/28/18 19:30      Venturi Mask 12.0 50


 


2/28/18 16:00 99.2 98 20 112/51 98 Venturi Mask  50





 99.2       


 


2/28/18 16:00  95      


 


2/28/18 15:01  99 23  96 Venturi Mask 12.0 50


 


2/28/18 14:49  94 23  97 Venturi Mask 12.0 50


 


2/28/18 12:14  98  121/53    


 


2/28/18 12:00  99      


 


2/28/18 12:00 99.8 114 20 121/53 97 Venturi Mask  50





 99.8       


 


2/28/18 11:14  113 24  96  10.0 50


 


2/28/18 11:05  108 24  95 Venturi Mask 10.0 50

















Intake and Output  


 


 2/28/18 3/1/18





 19:00 07:00


 


Intake Total 1182.220 ml 586.944 ml


 


Output Total 900 ml 650 ml


 


Balance 282.220 ml -63.056 ml


 


  


 


Free Water 100 ml 


 


IV Total 902.220 ml 156.944 ml


 


Tube Feeding 180 ml 330 ml


 


Other  100 ml


 


Output Urine Total 900 ml 650 ml


 


# Voids  1


 


# Bowel Movements 3 








General Appearance:  WD/WN


HEENT:  normocephalic, anicteric


Respiratory/Chest:  crackles/rales


Cardiovascular:  normal peripheral pulses, normal rate


Abdomen:  normal bowel sounds, soft, non tender


Extremities:  no cyanosis


Skin:  no rash, no ulcers





Microbiology








 Date/Time


Source Procedure


Growth Status


 


 


 2/27/18 06:40


Blood Blood Culture - Preliminary


NO GROWTH AFTER 48 HOURS Resulted


 


 2/27/18 06:25


Blood Blood Culture - Preliminary


NO GROWTH AFTER 48 HOURS Resulted


 


 2/27/18 06:38


Nasal Nares Influenza Types A,B Antigen (LINO) - Final Complete








Laboratory Tests


3/1/18 05:50: 


White Blood Count 7.3, Red Blood Count 3.60L, Hemoglobin 11.9L, Hematocrit 34.0L

, Mean Corpuscular Volume 94, Mean Corpuscular Hemoglobin 32.9H, Mean 

Corpuscular Hemoglobin Concent 34.9, Red Cell Distribution Width 11.9, Platelet 

Count 100L, Mean Platelet Volume 7.1, Neutrophils (%) (Auto) , Lymphocytes (%) (

Auto) , Monocytes (%) (Auto) , Eosinophils (%) (Auto) , Basophils (%) (Auto) , 

Neutrophils % (Manual) [Pending], Lymphocytes % (Manual) [Pending], Platelet 

Estimate [Pending], Platelet Morphology [Pending], Sodium Level 138, Potassium 

Level 3.8, Chloride Level 105, Carbon Dioxide Level 28, Anion Gap 5, Blood Urea 

Nitrogen 16, Creatinine 1.0, Estimat Glomerular Filtration Rate , Glucose Level 

213H, Uric Acid 2.5L, Calcium Level 8.3L, Phosphorus Level 1.8L, Magnesium 

Level 1.8, Total Bilirubin 1.2H, Direct Bilirubin 0.3, Aspartate Amino Transf (

AST/SGOT) 13L, Alanine Aminotransferase (ALT/SGPT) 15, Alkaline Phosphatase 66, 

Pro-B-Type Natriuretic Peptide 1077H, Total Protein 5.8L, Albumin 2.0L, 

Globulin 3.8, Albumin/Globulin Ratio 0.5L





Current Medications








 Medications


  (Trade)  Dose


 Ordered  Sig/Shasta


 Route


 PRN Reason  Start Time


 Stop Time Status Last Admin


Dose Admin


 


 Acetaminophen


  (Tylenol)  650 mg  EVERY 6 HOURS  PRN


 GT


 Mild Pain/Temp > 100.5  2/28/18 01:15


 3/30/18 01:14  3/1/18 06:27


 


 


 Albuterol Sulfate


  (Proventil)  2.5 mg  Q4HRT


 HHN


   2/28/18 15:00


 3/5/18 14:59  3/1/18 07:14


 


 


 Carvedilol


  (Coreg)  3.125 mg  EVERY 12  HOURS


 GT


   2/28/18 21:00


 3/30/18 20:59  2/28/18 20:24


 


 


 Famotidine


  (Pepcid)  20 mg  BID


 GT


   2/28/18 12:00


 3/30/18 11:59  2/28/18 18:24


 


 


 Heparin Sodium


  (Porcine)


  (Heparin 5000


 units/ml)  5,000 units  EVERY 12  HOURS


 SUBQ


   2/27/18 21:00


 3/29/18 20:59   


 


 


 Metoclopramide HCl


  (Reglan)  5 mg  EVERY 6  HOURS


 GT


   2/27/18 18:00


 3/29/18 17:59  3/1/18 06:19


 


 


 Piperacillin Sod/


 Tazobactam Sod


 3.375 gm/Sodium


 Chloride  110 ml @ 


 27.5 mls/hr  Q8HR


 IVPB


   2/27/18 15:00


 3/6/18 14:59  3/1/18 06:17


 


 


 Promethazine HCl/


 Codeine


  (Phenergan with


 Codeine)  5 ml  Q4H  PRN


 ORAL


 For Cough  2/27/18 21:30


 3/29/18 21:29   


 


 


 Sennosides


  (Senokot)  2 tab  BEDTIME


 GT


   2/27/18 21:00


 3/29/18 20:59  2/28/18 20:25


 


 


 Sitagliptin


 Phosphate


  (Januvia)  50 mg  DAILY


 GT


   2/28/18 09:00


 3/30/18 08:59  2/28/18 09:02


 


 


 Vancomycin HCl


  (Vanco rx to


 dose)  1 ea  DAILY  PRN


 MISC


 Per rx protocol  2/27/18 14:00


 3/29/18 13:59   


 


 


 Vancomycin HCl/


 Dextrose  250 ml @ 


 166.667


 mls/hr  Q24H


 IVPB


   2/28/18 10:00


 3/5/18 09:59  2/28/18 10:29


 

















JOAN DEMARCO Mar 1, 2018 08:28

## 2018-03-01 NOTE — WOUND CARE CONSULTATION
Wound Assessment


Wound Assessment #1:  


   Wound Number:  1


   Wound Present on Admission:  Yes


   New Wound:  No


   Status Change of Wound:  No


   Wound Location Body Site Modif:  mid


   Wound Location Body Site:  other - Sacrococcygeal extending to left and 

right buttocks


   Wound Type:  pressure ulcer


   Elpidio Test:  Does not Elpidio


   Pressure Ulcer Stage:  Deep Tissue Injury


   Wound Thickness:  Full Thickness


   Wound Length:  6.5


   Wound Width:  5.5


   Wound Depth:  utd


   Percent of Wound Purple/Maroon:  100


   Wound Drainage Amount:  None


   Wound Drainage Odor:  None/Absent


   Tissue Surrounding Wound:  Erythemic


   Wound General Appearance:  Reddened - purple


Wound Assessment #2:  


   Wound Number:  2


   Wound Present on Admission:  Yes


   New Wound:  No


   Status Change of Wound:  No


   Wound Location Body Site Modif:  left


   Wound Location Body Site:  heel


   Wound Type:  pressure ulcer


   Elpidio Test:  Does not Elpidio


   Pressure Ulcer Stage:  Deep Tissue Injury


   Wound Thickness:  Full Thickness


   Wound Length:  3.5


   Wound Width:  3.5


   Wound Depth:  utd


   Percent of Wound Purple/Maroon:  100


   Wound Drainage Amount:  None


   Wound Drainage Odor:  None/Absent


   Tissue Surrounding Wound:  Intact


   Wound General Appearance:  Reddened - maroon


Wound Assessment #3:  


   Wound Number:  3


   Wound Present on Admission:  Yes


   New Wound:  No


   Status Change of Wound:  No


   Wound Location Body Site Modif:  left


   Wound Location Body Site:  hand


   Wound Type:  traumatic injury


   Elpidio Test:  Does not Elpidio


   Traumatic Injury Wounds:  Skin Tear


   Wound Thickness:  Partial Thickness


   Wound Length:  2.5


   Wound Width:  1.5


   Wound Depth:  less than 0.1


   Percent of Wound Pink/Red:  100


   Wound Drainage Description:  Serosanguineous


   Wound Drainage Amount:  Scant


   Wound Drainage Odor:  None/Absent


   Tissue Surrounding Wound:  Erythemic


   Wound General Appearance:  Reddened, Draining


Wound Comment





#1 Sacrococcygeal DTI extensive to left and right buttocks


#2 Left heel DTI pressure ulcer


#3 Skin tear on left hand 





Recommendation





-Local wound care per protocol


-Keep clean and dry


-Turn and reposition 


-Offload both heels 


-Heel protector on both heels 


-Optimize nutrition


-Low air loss mattress


-Assess and f/u accordingly for any changes











RICH BRUNO RN Mar 1, 2018 12:15

## 2018-03-02 VITALS — DIASTOLIC BLOOD PRESSURE: 75 MMHG | SYSTOLIC BLOOD PRESSURE: 144 MMHG

## 2018-03-02 VITALS — DIASTOLIC BLOOD PRESSURE: 81 MMHG | SYSTOLIC BLOOD PRESSURE: 145 MMHG

## 2018-03-02 VITALS — SYSTOLIC BLOOD PRESSURE: 114 MMHG | DIASTOLIC BLOOD PRESSURE: 66 MMHG

## 2018-03-02 VITALS — DIASTOLIC BLOOD PRESSURE: 66 MMHG | SYSTOLIC BLOOD PRESSURE: 120 MMHG

## 2018-03-02 VITALS — DIASTOLIC BLOOD PRESSURE: 55 MMHG | SYSTOLIC BLOOD PRESSURE: 101 MMHG

## 2018-03-02 VITALS — SYSTOLIC BLOOD PRESSURE: 114 MMHG | DIASTOLIC BLOOD PRESSURE: 62 MMHG

## 2018-03-02 RX ADMIN — CARVEDILOL SCH MG: 6.25 TABLET, FILM COATED ORAL at 09:13

## 2018-03-02 RX ADMIN — ALBUTEROL SULFATE SCH MG: 2.5 SOLUTION RESPIRATORY (INHALATION) at 03:51

## 2018-03-02 RX ADMIN — ALBUTEROL SULFATE SCH MG: 2.5 SOLUTION RESPIRATORY (INHALATION) at 11:21

## 2018-03-02 RX ADMIN — DEXTROSE MONOHYDRATE SCH MLS/HR: 50 INJECTION, SOLUTION INTRAVENOUS at 13:57

## 2018-03-02 RX ADMIN — ACETAMINOPHEN PRN MG: 160 SOLUTION ORAL at 03:36

## 2018-03-02 RX ADMIN — THEOPHYLLINE ANHYDROUS SCH MG: 100 CAPSULE, EXTENDED RELEASE ORAL at 09:00

## 2018-03-02 RX ADMIN — Medication SCH TAB: at 20:24

## 2018-03-02 RX ADMIN — ALBUTEROL SULFATE SCH MG: 2.5 SOLUTION RESPIRATORY (INHALATION) at 22:49

## 2018-03-02 RX ADMIN — HEPARIN SODIUM SCH UNITS: 5000 INJECTION INTRAVENOUS; SUBCUTANEOUS at 09:00

## 2018-03-02 RX ADMIN — SITAGLIPTIN SCH MG: 50 TABLET, FILM COATED ORAL at 09:13

## 2018-03-02 RX ADMIN — ACETAMINOPHEN PRN MG: 160 SOLUTION ORAL at 20:33

## 2018-03-02 RX ADMIN — METOCLOPRAMIDE HYDROCHLORIDE SCH MG: 5 SOLUTION ORAL at 17:48

## 2018-03-02 RX ADMIN — HEPARIN SODIUM SCH UNITS: 5000 INJECTION INTRAVENOUS; SUBCUTANEOUS at 20:22

## 2018-03-02 RX ADMIN — INSULIN ASPART SCH UNITS: 100 INJECTION, SOLUTION INTRAVENOUS; SUBCUTANEOUS at 13:18

## 2018-03-02 RX ADMIN — ALBUTEROL SULFATE SCH MG: 2.5 SOLUTION RESPIRATORY (INHALATION) at 15:48

## 2018-03-02 RX ADMIN — METOCLOPRAMIDE HYDROCHLORIDE SCH MG: 5 SOLUTION ORAL at 06:00

## 2018-03-02 RX ADMIN — ALBUTEROL SULFATE SCH MG: 2.5 SOLUTION RESPIRATORY (INHALATION) at 19:45

## 2018-03-02 RX ADMIN — CARVEDILOL SCH MG: 6.25 TABLET, FILM COATED ORAL at 20:34

## 2018-03-02 RX ADMIN — INSULIN ASPART SCH UNITS: 100 INJECTION, SOLUTION INTRAVENOUS; SUBCUTANEOUS at 17:51

## 2018-03-02 RX ADMIN — VANCOMYCIN HCL-SODIUM CHLORIDE IV SOLN 1.25 GM/250ML-0.9% SCH MLS/HR: 1.25-0.9/25 SOLUTION at 10:41

## 2018-03-02 RX ADMIN — DEXTROSE MONOHYDRATE SCH MLS/HR: 50 INJECTION, SOLUTION INTRAVENOUS at 21:27

## 2018-03-02 RX ADMIN — METOCLOPRAMIDE HYDROCHLORIDE SCH MG: 5 SOLUTION ORAL at 00:29

## 2018-03-02 RX ADMIN — ALBUTEROL SULFATE SCH MG: 2.5 SOLUTION RESPIRATORY (INHALATION) at 07:40

## 2018-03-02 RX ADMIN — DEXTROSE MONOHYDRATE SCH MLS/HR: 50 INJECTION, SOLUTION INTRAVENOUS at 05:13

## 2018-03-02 RX ADMIN — METOCLOPRAMIDE HYDROCHLORIDE SCH MG: 5 SOLUTION ORAL at 13:22

## 2018-03-02 NOTE — PULMONOLOGY PROGRESS NOTE
Assessment/Plan


Problems:  


(1) Respiratory distress


(2) Pneumonia


(3) HCAP (healthcare-associated pneumonia)


(4) Sepsis


(5) Diabetes mellitus


(6) Feeding by G-tube


(7) Functional quadriplegia


(8) Dementia


Assessment/Plan


chest pt


iv abx


respiratory treatment


check sputum, still pending


titrate fio2


chest pt


anttussives.


repeat CXR 


labs for am


continue current care.





Subjective


Interval Events:  pt is still on FM, coughing a lot


Respiratory:  Reports: productive cough, shortness of breath


Allergies:  


Coded Allergies:  


     No Known Allergies (Unverified , 3/23/16)





Objective





Last 24 Hour Vital Signs








  Date Time  Temp Pulse Resp B/P (MAP) Pulse Ox O2 Delivery O2 Flow Rate FiO2


 


3/2/18 09:13  103  120/66    


 


3/2/18 08:30 98.1 103 20 120/66 94 Venturi Mask  30





 98.1       


 


3/2/18 07:51  101 20  97 Venturi Mask 4.0 30


 


3/2/18 07:41  95 20  96 Venturi Mask 3.0 40


 


3/2/18 07:40      Venturi Mask 4.0 30


 


3/2/18 07:39     96 Venturi Mask 4.0 30


 


3/2/18 05:25 99.7       


 


3/2/18 04:04  103 22  99 Venturi Mask 4.0 30


 


3/2/18 04:00 98.7 94 22 101/55 94 Venturi Mask  30





 98.7       


 


3/2/18 04:00  98      


 


3/2/18 03:52  109 22  93 Nasal Cannula 2.0 28


 


3/2/18 03:36 99.4       


 


3/2/18 00:00  105      


 


3/2/18 00:00 99.0 95 32 114/66 90 Venturi Mask  30





 99.0       


 


3/1/18 22:54  90 20  96 Venturi Mask 4.0 30


 


3/1/18 22:43  91 20  93 Nasal Cannula 2.0 28


 


3/1/18 20:23  95  151/68    


 


3/1/18 20:19 101.5       


 


3/1/18 20:00 99.7 123 32 141/65 90 Venturi Mask  30





 99.7       


 


3/1/18 19:27  119      


 


3/1/18 19:27  95 22  95 Venturi Mask 4.0 30


 


3/1/18 19:20     90 Venturi Mask 12.0 50


 


3/1/18 19:20      Venturi Mask 12.0 50


 


3/1/18 19:16  111 21  90 Nasal Cannula 2.0 28


 


3/1/18 16:00  110      


 


3/1/18 16:00 98.3 110 24 151/68 96 Venturi Mask  30





 98.3       


 


3/1/18 15:28  99 20  95 Venturi Mask 4.0 30


 


3/1/18 15:18  100 20  95 Nasal Cannula 2.0 28


 


3/1/18 12:00 98.1 100 22 120/60 92 Venturi Mask  50





 98.1       


 


3/1/18 12:00  95      


 


3/1/18 11:21  102 20  95 Venturi Mask 4.0 30

















Intake and Output  


 


 3/1/18 3/2/18





 19:00 07:00


 


Intake Total 1012.500 ml 600.0 ml


 


Output Total 1500 ml 1050 ml


 


Balance -487.500 ml -450.0 ml


 


  


 


Free Water 150 ml 100 ml


 


IV Total 502.500 ml 110.0 ml


 


Tube Feeding 360 ml 330 ml


 


Other  60 ml


 


Output Urine Total 1500 ml 1050 ml








General Appearance:  WD/WN


HEENT:  normocephalic, atraumatic


Respiratory/Chest:  chest wall non-tender, decreased breath sounds, accessory 

muscle use, crackles/rales


Cardiovascular:  normal peripheral pulses, regular rhythm


Abdomen:  normal bowel sounds, soft, non tender


Genitourinary:  normal external genitalia


Extremities:  no cyanosis


Neurologic/Psychiatric:  no motor/sensory deficits, abnormal gait





Microbiology








 Date/Time


Source Procedure


Growth Status


 


 


 2/28/18 01:00


Sputum Gram Stain - Final Complete


 


 2/28/18 01:00


Sputum Sputum Culture - Final


NORMAL UPPER RESPIRATORY SHALOM PRESENT Complete








Laboratory Tests


3/2/18 09:30: Vancomycin Level Trough 8.2





Current Medications








 Medications


  (Trade)  Dose


 Ordered  Sig/Shasta


 Route


 PRN Reason  Start Time


 Stop Time Status Last Admin


Dose Admin


 


 Acetaminophen


  (Tylenol)  650 mg  EVERY 6 HOURS  PRN


 GT


 Mild Pain/Temp > 100.5  2/28/18 01:15


 3/30/18 01:14  3/2/18 03:36


 


 


 Albuterol Sulfate


  (Proventil)  2.5 mg  Q4HRT


 HHN


   2/28/18 15:00


 3/5/18 14:59  3/2/18 07:40


 


 


 Carvedilol


  (Coreg)  6.25 mg  EVERY 12  HOURS


 GT


   3/1/18 21:00


 3/31/18 20:59  3/2/18 09:13


 


 


 Famotidine


  (Pepcid)  20 mg  BID


 GT


   2/28/18 12:00


 3/30/18 11:59  3/2/18 09:13


 


 


 Furosemide


  (Lasix)  20 mg  DAILY


 IV


   3/2/18 09:00


 4/1/18 08:59  3/2/18 09:10


 


 


 Heparin Sodium


  (Porcine)


  (Heparin 5000


 units/ml)  5,000 units  EVERY 12  HOURS


 SUBQ


   2/27/18 21:00


 3/29/18 20:59   


 


 


 Metoclopramide HCl


  (Reglan)  5 mg  EVERY 6  HOURS


 GT


   2/27/18 18:00


 3/29/18 17:59  3/2/18 06:00


 


 


 Piperacillin Sod/


 Tazobactam Sod


 3.375 gm/Sodium


 Chloride  110 ml @ 


 27.5 mls/hr  Q8HR


 IVPB


   2/27/18 15:00


 3/6/18 14:59  3/2/18 05:13


 


 


 Potassium Chloride


  (K-Dur)  40 meq  DAILY


 GT


   3/2/18 09:00


 4/1/18 08:59  3/2/18 09:13


 


 


 Promethazine HCl/


 Codeine


  (Phenergan with


 Codeine)  5 ml  Q4H  PRN


 ORAL


 For Cough  3/1/18 09:00


 3/31/18 08:59   


 


 


 Sennosides


  (Senokot)  2 tab  BEDTIME


 GT


   2/27/18 21:00


 3/29/18 20:59  3/1/18 20:23


 


 


 Sitagliptin


 Phosphate


  (Januvia)  100 mg  DAILY


 GT


   3/2/18 09:00


 4/1/18 08:59  3/2/18 09:13


 


 


 Theophylline


  (Dani-Dur)  100 mg  EVERY 12  HOURS


 ORAL


   3/1/18 09:00


 3/31/18 08:59  3/2/18 09:00


 


 


 Vancomycin HCl


  (Vanco rx to


 dose)  1 ea  DAILY  PRN


 MISC


 Per rx protocol  2/27/18 14:00


 3/29/18 13:59   


 


 


 Vancomycin HCl


 500 mg/Dextrose  275 ml @ 


 275 mls/hr  ONCE  ONCE


 IVPB


   3/2/18 16:00


 3/2/18 16:59   


 


 


 Vancomycin HCl/


 Dextrose  250 ml @ 


 125 mls/hr  Q24H


 IVPB


   3/3/18 10:00


 3/8/18 09:59   


 


 


 Vancomycin HCl/


 Dextrose  250 ml @ 


 166.667


 mls/hr  Q24H


 IVPB


   2/28/18 10:00


 3/2/18 15:59  3/2/18 10:41


 

















JOAN DEMARCO Mar 2, 2018 11:22

## 2018-03-02 NOTE — GENERAL PROGRESS NOTE
Assessment/Plan


Problem List:  


(1) Pneumonia


ICD Codes:  J18.9 - Pneumonia, unspecified organism


SNOMED:  492728140


(2) Functional quadriplegia


ICD Codes:  R53.2 - Functional quadriplegia


SNOMED:  401109598662728


(3) Diabetes mellitus


ICD Codes:  E11.9 - Type 2 diabetes mellitus without complications


SNOMED:  59205907


(4) UTI (urinary tract infection)


ICD Codes:  N39.0 - Urinary tract infection, site not specified


SNOMED:  45013400


(5) Respiratory distress


ICD Codes:  R06.03 - Acute respiratory distress


SNOMED:  853347328


Status:  unchanged


Assessment/Plan





lasix IV


K Phos supplement


increase coreg and 


antibiotics-


breathing treatment


start feeding Gt


increase Januvia


sliding scale insulin


2D echo


Left ventricular ejection fraction estimated to be 60-65%.


No evidence of  left ventricular hypertrophy.





Subjective


ROS Limited/Unobtainable:  No


Constitutional:  Reports: malaise


Respiratory:  Reports: cough


Allergies:  


Coded Allergies:  


     No Known Allergies (Unverified , 3/23/16)





Objective





Last 24 Hour Vital Signs








  Date Time  Temp Pulse Resp B/P (MAP) Pulse Ox O2 Delivery O2 Flow Rate FiO2


 


3/2/18 15:48  96 22  94 Venturi Mask 3.0 40


 


3/2/18 12:00 98.2 89 22 114/62 96 Venturi Mask  30





 98.2       


 


3/2/18 12:00  88      


 


3/2/18 11:31  96 22  97 Venturi Mask 4.0 30


 


3/2/18 11:21  88 22  95 Venturi Mask 3.0 40


 


3/2/18 09:13  103  120/66    


 


3/2/18 08:30 98.1 103 20 120/66 94 Venturi Mask  30





 98.1       


 


3/2/18 07:51  101 20  97 Venturi Mask 4.0 30


 


3/2/18 07:41  95 20  96 Venturi Mask 3.0 40


 


3/2/18 07:40      Venturi Mask 4.0 30


 


3/2/18 07:39     96 Venturi Mask 4.0 30


 


3/2/18 07:31  93      


 


3/2/18 05:25 99.7       


 


3/2/18 04:04  103 22  99 Venturi Mask 4.0 30


 


3/2/18 04:00 98.7 94 22 101/55 94 Venturi Mask  30





 98.7       


 


3/2/18 04:00  98      


 


3/2/18 03:52  109 22  93 Nasal Cannula 2.0 28


 


3/2/18 03:36 99.4       


 


3/2/18 00:00  105      


 


3/2/18 00:00 99.0 95 32 114/66 90 Venturi Mask  30





 99.0       


 


3/1/18 22:54  90 20  96 Venturi Mask 4.0 30


 


3/1/18 22:43  91 20  93 Nasal Cannula 2.0 28


 


3/1/18 20:23  95  151/68    


 


3/1/18 20:19 101.5       


 


3/1/18 20:00 99.7 123 32 141/65 90 Venturi Mask  30





 99.7       


 


3/1/18 19:27  119      


 


3/1/18 19:27  95 22  95 Venturi Mask 4.0 30


 


3/1/18 19:20     90 Venturi Mask 12.0 50


 


3/1/18 19:20      Venturi Mask 12.0 50


 


3/1/18 19:16  111 21  90 Nasal Cannula 2.0 28


 


3/1/18 16:00  110      


 


3/1/18 16:00 98.3 110 24 151/68 96 Venturi Mask  30





 98.3       

















Intake and Output  


 


 3/1/18 3/2/18





 19:00 07:00


 


Intake Total 1012.500 ml 630.0 ml


 


Output Total 1500 ml 1050 ml


 


Balance -487.500 ml -420.0 ml


 


  


 


Free Water 150 ml 100 ml


 


IV Total 502.500 ml 110.0 ml


 


Tube Feeding 360 ml 360 ml


 


Other  60 ml


 


Output Urine Total 1500 ml 1050 ml








Laboratory Tests


3/2/18 09:30: Vancomycin Level Trough 8.2


Height (Feet):  5


Height (Inches):  7.00


Weight (Pounds):  172


General Appearance:  lethargic


Cardiovascular:  tachycardia


Respiratory/Chest:  decreased breath sounds, rhonchi - bilaterally


Abdomen:  soft, distended


Objective


no other changes











NAY CALDWELL 2, 2018 16:01

## 2018-03-02 NOTE — INFECTIOUS DISEASES PROG NOTE
Assessment/Plan


Problems:  


(1) HCAP (healthcare-associated pneumonia)


Assessment & Plan:  with right side lung infiltrates, suspect aspiration, 

continue  vancomycin and zosyn empiric coverage, pending sputum culture , 

monitor CXR 





(2) UTI (urinary tract infection)


Assessment & Plan:  await  urine culture and continue zosyn 





(3) Sepsis


Assessment & Plan:  due to the above, pending  blood culture. continue  

vancomycin with zosyn empirically 





(4) Fever


Assessment & Plan:  improving , due to the above, continue wide spectrum 

antibiotics, and tylenol as needed 





(5) Acute respiratory failure


Assessment & Plan:  due to the above, continue High flow oxygen via mask, and 

inhalers, monitor CXR 





(6) Diabetes mellitus


Assessment & Plan:  recommend tight glycemic control to keep blood glucose 

between 100-140 








Subjective


ROS Limited/Unobtainable:  Yes


Allergies:  


Coded Allergies:  


     No Known Allergies (Unverified , 3/23/16)


Subjective


he was awake and alert, communicative, coughing brownish phlegm , had  fever  

today, no SOB, on high flow oxygen





Objective


Vital Signs





Last 24 Hour Vital Signs








  Date Time  Temp Pulse Resp B/P (MAP) Pulse Ox O2 Delivery O2 Flow Rate FiO2


 


3/2/18 11:31  96 22  97 Venturi Mask 4.0 30


 


3/2/18 11:21  88 22  95 Venturi Mask 3.0 40


 


3/2/18 09:13  103  120/66    


 


3/2/18 08:30 98.1 103 20 120/66 94 Venturi Mask  30





 98.1       


 


3/2/18 07:51  101 20  97 Venturi Mask 4.0 30


 


3/2/18 07:41  95 20  96 Venturi Mask 3.0 40


 


3/2/18 07:40      Venturi Mask 4.0 30


 


3/2/18 07:39     96 Venturi Mask 4.0 30


 


3/2/18 07:31  93      


 


3/2/18 05:25 99.7       


 


3/2/18 04:04  103 22  99 Venturi Mask 4.0 30


 


3/2/18 04:00 98.7 94 22 101/55 94 Venturi Mask  30





 98.7       


 


3/2/18 04:00  98      


 


3/2/18 03:52  109 22  93 Nasal Cannula 2.0 28


 


3/2/18 03:36 99.4       


 


3/2/18 00:00  105      


 


3/2/18 00:00 99.0 95 32 114/66 90 Venturi Mask  30





 99.0       


 


3/1/18 22:54  90 20  96 Venturi Mask 4.0 30


 


3/1/18 22:43  91 20  93 Nasal Cannula 2.0 28


 


3/1/18 20:23  95  151/68    


 


3/1/18 20:19 101.5       


 


3/1/18 20:00 99.7 123 32 141/65 90 Venturi Mask  30





 99.7       


 


3/1/18 19:27  119      


 


3/1/18 19:27  95 22  95 Venturi Mask 4.0 30


 


3/1/18 19:20     90 Venturi Mask 12.0 50


 


3/1/18 19:20      Venturi Mask 12.0 50


 


3/1/18 19:16  111 21  90 Nasal Cannula 2.0 28


 


3/1/18 16:00  110      


 


3/1/18 16:00 98.3 110 24 151/68 96 Venturi Mask  30





 98.3       


 


3/1/18 15:28  99 20  95 Venturi Mask 4.0 30


 


3/1/18 15:18  100 20  95 Nasal Cannula 2.0 28








Height (Feet):  5


Height (Inches):  7.00


Weight (Pounds):  172


General Appearance:  WD/WN, no acute distress


HEENT:  normocephalic, atraumatic, anicteric, mucous membranes moist, PERRL


Respiratory/Chest:  chest wall non-tender, no respiratory distress, no 

accessory muscle use, decreased breath sounds, crackles/rales, expiratory 

wheezing


Cardiovascular:  normal peripheral pulses, normal rate, regular rhythm, no 

gallop/murmur, no JVD


Abdomen:  normal bowel sounds, soft, non tender, no organomegaly, non distended

, no mass, no scars


Extremities:  no cyanosis, no clubbing


Skin:  no rash, no lesions, no ulcers


Neurologic/Psychiatric:  alert, responsive





Microbiology








 Date/Time


Source Procedure


Growth Status


 


 


 2/28/18 01:00


Sputum Gram Stain - Final Complete


 


 2/28/18 01:00


Sputum Sputum Culture - Final


NORMAL UPPER RESPIRATORY SHALOM PRESENT Complete











Laboratory Tests








Test


  3/2/18


09:30


 


Vancomycin Level Trough


  8.2 ug/mL


(5.0-12.0)











Current Medications








 Medications


  (Trade)  Dose


 Ordered  Sig/Shasta


 Route


 PRN Reason  Start Time


 Stop Time Status Last Admin


Dose Admin


 


 Acetaminophen


  (Tylenol)  650 mg  EVERY 6 HOURS  PRN


 GT


 Mild Pain/Temp > 100.5  2/28/18 01:15


 3/30/18 01:14  3/2/18 03:36


 


 


 Albuterol Sulfate


  (Proventil)  2.5 mg  Q4HRT


 HHN


   2/28/18 15:00


 3/5/18 14:59  3/2/18 11:21


 


 


 Carvedilol


  (Coreg)  6.25 mg  EVERY 12  HOURS


 GT


   3/1/18 21:00


 3/31/18 20:59  3/2/18 09:13


 


 


 Dextrose


  (Dextrose 50%)    STAT  PRN


 IV


 Hypoglycemia  3/2/18 12:00


 4/1/18 11:59   


 


 


 Famotidine


  (Pepcid)  20 mg  BID


 GT


   2/28/18 12:00


 3/30/18 11:59  3/2/18 09:13


 


 


 Furosemide


  (Lasix)  20 mg  DAILY


 IV


   3/2/18 09:00


 4/1/18 08:59  3/2/18 09:10


 


 


 Heparin Sodium


  (Porcine)


  (Heparin 5000


 units/ml)  5,000 units  EVERY 12  HOURS


 SUBQ


   2/27/18 21:00


 3/29/18 20:59   


 


 


 Insulin Aspart


  (NovoLOG)    EVERY 6  HOURS


 SUBQ


   3/2/18 12:00


 4/1/18 11:59  3/2/18 13:18


 


 


 Metoclopramide HCl


  (Reglan)  5 mg  EVERY 6  HOURS


 GT


   2/27/18 18:00


 3/29/18 17:59  3/2/18 13:22


 


 


 Piperacillin Sod/


 Tazobactam Sod


 3.375 gm/Sodium


 Chloride  110 ml @ 


 27.5 mls/hr  Q8HR


 IVPB


   2/27/18 15:00


 3/6/18 14:59  3/2/18 13:57


 


 


 Potassium Chloride


  (K-Dur)  40 meq  DAILY


 GT


   3/2/18 09:00


 4/1/18 08:59  3/2/18 09:13


 


 


 Promethazine HCl/


 Codeine


  (Phenergan with


 Codeine)  5 ml  Q4H  PRN


 ORAL


 For Cough  3/1/18 09:00


 3/31/18 08:59   


 


 


 Sennosides


  (Senokot)  2 tab  BEDTIME


 GT


   2/27/18 21:00


 3/29/18 20:59  3/1/18 20:23


 


 


 Sitagliptin


 Phosphate


  (Januvia)  100 mg  DAILY


 GT


   3/2/18 09:00


 4/1/18 08:59  3/2/18 09:13


 


 


 Theophylline


  (Theophylline)  80 mg  EVERY 12  HOURS


 GT


   3/2/18 21:00


 4/1/18 20:59   


 


 


 Vancomycin HCl


  (Vanco rx to


 dose)  1 ea  DAILY  PRN


 MISC


 Per rx protocol  2/27/18 14:00


 3/29/18 13:59   


 


 


 Vancomycin HCl


 500 mg/Dextrose  275 ml @ 


 275 mls/hr  ONCE  ONCE


 IVPB


   3/2/18 16:00


 3/2/18 16:59   


 


 


 Vancomycin HCl/


 Dextrose  250 ml @ 


 125 mls/hr  Q24H


 IVPB


   3/3/18 10:00


 3/8/18 09:59   


 


 


 Vancomycin HCl/


 Dextrose  250 ml @ 


 166.667


 mls/hr  Q24H


 IVPB


   2/28/18 10:00


 3/2/18 15:59  3/2/18 10:41


 

















Amira Gross M.D. Mar 2, 2018 14:08

## 2018-03-02 NOTE — DIAGNOSTIC IMAGING REPORT
Indication: Dyspnea

 

Comparison:  2/28/2018

 

A single view chest radiograph was obtained.

 

Findings:

 

Both interstitial and alveolar disease demonstrated within the lungs bilaterally.

Findings probably due to patchy infiltrate or asymmetric edema. Heart size remains

normal. The findings appear grossly unchanged.

 

IMPRESSION:

 

No significant change compared to the previous day

## 2018-03-03 VITALS — SYSTOLIC BLOOD PRESSURE: 135 MMHG | DIASTOLIC BLOOD PRESSURE: 72 MMHG

## 2018-03-03 VITALS — SYSTOLIC BLOOD PRESSURE: 131 MMHG | DIASTOLIC BLOOD PRESSURE: 67 MMHG

## 2018-03-03 VITALS — SYSTOLIC BLOOD PRESSURE: 136 MMHG | DIASTOLIC BLOOD PRESSURE: 72 MMHG

## 2018-03-03 VITALS — SYSTOLIC BLOOD PRESSURE: 128 MMHG | DIASTOLIC BLOOD PRESSURE: 74 MMHG

## 2018-03-03 VITALS — SYSTOLIC BLOOD PRESSURE: 134 MMHG | DIASTOLIC BLOOD PRESSURE: 68 MMHG

## 2018-03-03 VITALS — SYSTOLIC BLOOD PRESSURE: 115 MMHG | DIASTOLIC BLOOD PRESSURE: 61 MMHG

## 2018-03-03 LAB
ADD MANUAL DIFF: NO
ALBUMIN SERPL-MCNC: 2.1 G/DL (ref 3.4–5)
ALBUMIN/GLOB SERPL: 0.5 {RATIO} (ref 1–2.7)
ALP SERPL-CCNC: 100 U/L (ref 46–116)
ALT SERPL-CCNC: 20 U/L (ref 12–78)
ANION GAP SERPL CALC-SCNC: 4 MMOL/L (ref 5–15)
AST SERPL-CCNC: 17 U/L (ref 15–37)
BASOPHILS NFR BLD AUTO: 0.5 % (ref 0–2)
BILIRUB SERPL-MCNC: 0.7 MG/DL (ref 0.2–1)
BUN SERPL-MCNC: 18 MG/DL (ref 7–18)
CALCIUM SERPL-MCNC: 8.9 MG/DL (ref 8.5–10.1)
CHLORIDE SERPL-SCNC: 102 MMOL/L (ref 98–107)
CO2 SERPL-SCNC: 32 MMOL/L (ref 21–32)
CREAT SERPL-MCNC: 1.1 MG/DL (ref 0.55–1.3)
EOSINOPHIL NFR BLD AUTO: 1.1 % (ref 0–3)
ERYTHROCYTE [DISTWIDTH] IN BLOOD BY AUTOMATED COUNT: 12.2 % (ref 11.6–14.8)
GLOBULIN SER-MCNC: 4.4 G/DL
HCT VFR BLD CALC: 39.7 % (ref 42–52)
HGB BLD-MCNC: 13.7 G/DL (ref 14.2–18)
LYMPHOCYTES NFR BLD AUTO: 13.3 % (ref 20–45)
MCV RBC AUTO: 96 FL (ref 80–99)
MONOCYTES NFR BLD AUTO: 8.4 % (ref 1–10)
NEUTROPHILS NFR BLD AUTO: 76.7 % (ref 45–75)
PHOSPHATE SERPL-MCNC: 1.7 MG/DL (ref 2.5–4.9)
PLATELET # BLD: 107 K/UL (ref 150–450)
POTASSIUM SERPL-SCNC: 3.7 MMOL/L (ref 3.5–5.1)
RBC # BLD AUTO: 4.15 M/UL (ref 4.7–6.1)
SODIUM SERPL-SCNC: 137 MMOL/L (ref 136–145)
WBC # BLD AUTO: 5.5 K/UL (ref 4.8–10.8)

## 2018-03-03 RX ADMIN — Medication SCH MLS/HR: at 10:42

## 2018-03-03 RX ADMIN — DEXTROSE MONOHYDRATE SCH MLS/HR: 50 INJECTION, SOLUTION INTRAVENOUS at 05:34

## 2018-03-03 RX ADMIN — IPRATROPIUM BROMIDE AND ALBUTEROL SULFATE SCH ML: .5; 3 SOLUTION RESPIRATORY (INHALATION) at 20:28

## 2018-03-03 RX ADMIN — HEPARIN SODIUM SCH UNITS: 5000 INJECTION INTRAVENOUS; SUBCUTANEOUS at 20:01

## 2018-03-03 RX ADMIN — CARVEDILOL SCH MG: 6.25 TABLET, FILM COATED ORAL at 20:23

## 2018-03-03 RX ADMIN — ALBUTEROL SULFATE SCH MG: 2.5 SOLUTION RESPIRATORY (INHALATION) at 07:33

## 2018-03-03 RX ADMIN — INSULIN ASPART SCH UNITS: 100 INJECTION, SOLUTION INTRAVENOUS; SUBCUTANEOUS at 01:22

## 2018-03-03 RX ADMIN — HEPARIN SODIUM SCH UNITS: 5000 INJECTION INTRAVENOUS; SUBCUTANEOUS at 08:32

## 2018-03-03 RX ADMIN — METOCLOPRAMIDE HYDROCHLORIDE SCH MG: 5 SOLUTION ORAL at 11:35

## 2018-03-03 RX ADMIN — DEXTROSE MONOHYDRATE SCH MLS/HR: 50 INJECTION, SOLUTION INTRAVENOUS at 21:29

## 2018-03-03 RX ADMIN — SITAGLIPTIN SCH MG: 50 TABLET, FILM COATED ORAL at 08:32

## 2018-03-03 RX ADMIN — DEXTROSE MONOHYDRATE SCH MLS/HR: 50 INJECTION, SOLUTION INTRAVENOUS at 13:57

## 2018-03-03 RX ADMIN — IPRATROPIUM BROMIDE AND ALBUTEROL SULFATE SCH ML: .5; 3 SOLUTION RESPIRATORY (INHALATION) at 15:30

## 2018-03-03 RX ADMIN — ALBUTEROL SULFATE SCH MG: 2.5 SOLUTION RESPIRATORY (INHALATION) at 11:40

## 2018-03-03 RX ADMIN — Medication SCH TAB: at 20:01

## 2018-03-03 RX ADMIN — CARVEDILOL SCH MG: 6.25 TABLET, FILM COATED ORAL at 08:32

## 2018-03-03 RX ADMIN — INSULIN ASPART SCH UNITS: 100 INJECTION, SOLUTION INTRAVENOUS; SUBCUTANEOUS at 05:35

## 2018-03-03 RX ADMIN — ALBUTEROL SULFATE SCH MG: 2.5 SOLUTION RESPIRATORY (INHALATION) at 03:33

## 2018-03-03 RX ADMIN — INSULIN ASPART SCH UNITS: 100 INJECTION, SOLUTION INTRAVENOUS; SUBCUTANEOUS at 18:14

## 2018-03-03 RX ADMIN — METOCLOPRAMIDE HYDROCHLORIDE SCH MG: 5 SOLUTION ORAL at 01:19

## 2018-03-03 RX ADMIN — METOCLOPRAMIDE HYDROCHLORIDE SCH MG: 5 SOLUTION ORAL at 05:34

## 2018-03-03 RX ADMIN — METOCLOPRAMIDE HYDROCHLORIDE SCH MG: 5 SOLUTION ORAL at 18:11

## 2018-03-03 RX ADMIN — INSULIN ASPART SCH UNITS: 100 INJECTION, SOLUTION INTRAVENOUS; SUBCUTANEOUS at 11:37

## 2018-03-03 NOTE — PULMONOLOGY PROGRESS NOTE
Assessment/Plan


Assessment/Plan


ASSESSMENT


Acute hypoxemic respiratory failure


HCAP 


possible sepsis 


dysphagia G tube


DM 


Functional quadriplegia 


Sacrococcyx DTI POA 


L heel DTI POA 





PLAN OF CARE


JULIENNE


O2 titrate to keep sat above 92%


Pulmonary toilet CPT with HHN ATC and HHN prn 


abx ID flows 


blood and sputum cx negative 


influenza screen negative 


a/tussive prn 


Theophylline 


BS management with Januvia and SSI


ECHO with pEF 60-65%, aortic sclerosis


gentle diuresis 


monitor cardiorenal parameters , lytes ; correct lytes as needed 


Wound care as per wound nurse recs


DVT GI prophylaxis


DNI status  





case discussed and evaluated by supervising physician





Subjective


Allergies:  


Coded Allergies:  


     No Known Allergies (Unverified , 3/23/16)


Subjective


congested


afebrile , no leukocytosis





Objective





Last 24 Hour Vital Signs








  Date Time  Temp Pulse Resp B/P (MAP) Pulse Ox O2 Delivery O2 Flow Rate FiO2


 


3/3/18 04:00 98.4 98 20 135/72 99 Venturi Mask  30





 98.4       


 


3/3/18 04:00  94      


 


3/3/18 03:45  72 22  98 Venturi Mask 6.0 45


 


3/3/18 03:34  98 20  98 Venturi Mask 6.0 35


 


3/3/18 00:00 98.2 88 18 115/61 97 Venturi Mask  30





 98.2       


 


3/3/18 00:00  89      


 


3/2/18 22:56  92 22  98 Venturi Mask 6.0 45


 


3/2/18 22:50  79 20  96 Venturi Mask 6.0 45


 


3/2/18 21:03 99.0       


 


3/2/18 20:34  105  144/75    


 


3/2/18 20:33 100.9       


 


3/2/18 20:00 100.9 105 20 144/75 97 Venturi Mask  30





 100.9       


 


3/2/18 20:00  110      


 


3/2/18 19:56  97 22  95 Venturi Mask 4.0 30


 


3/2/18 19:46  101 20  95 Venturi Mask 3.0 40


 


3/2/18 19:45     95 Venturi Mask 4.0 30


 


3/2/18 19:45      Venturi Mask 4.0 30


 


3/2/18 16:00  100      


 


3/2/18 16:00 99.9 101 28 145/81 94 Venturi Mask  30





 99.9       


 


3/2/18 15:58  102 22  98 Venturi Mask 4.0 30


 


3/2/18 15:48  96 22  94 Venturi Mask 3.0 40


 


3/2/18 12:00 98.2 89 22 114/62 96 Venturi Mask  30





 98.2       


 


3/2/18 12:00  88      


 


3/2/18 11:31  96 22  97 Venturi Mask 4.0 30


 


3/2/18 11:21  88 22  95 Venturi Mask 3.0 40


 


3/2/18 09:13  103  120/66    


 


3/2/18 08:30 98.1 103 20 120/66 94 Venturi Mask  30





 98.1       


 


3/2/18 07:51  101 20  97 Venturi Mask 4.0 30


 


3/2/18 07:41  95 20  96 Venturi Mask 3.0 40


 


3/2/18 07:40      Venturi Mask 4.0 30


 


3/2/18 07:39     96 Venturi Mask 4.0 30


 


3/2/18 07:31  93      

















Intake and Output  


 


 3/2/18 3/3/18





 19:00 07:00


 


Intake Total 1315.000 ml 787.5 ml


 


Output Total 1550 ml 550 ml


 


Balance -235.000 ml 237.5 ml


 


  


 


Free Water 110 ml 


 


IV Total 745.000 ml 137.5 ml


 


Tube Feeding 460 ml 550 ml


 


Other  100 ml


 


Output Urine Total 1550 ml 550 ml


 


# Voids  1


 


# Bowel Movements  3








General Appearance:  other - mild resp distress, poorly responsive elderly 

bedridden male


HEENT:  normocephalic, atraumatic, anicteric


Respiratory/Chest:  crackles/rales


Cardiovascular:  regular rhythm - ST o tele , tachycardia


Abdomen:  normal bowel sounds, soft, non tender


Extremities:  no edema, pedal pulses normal


Neurologic/Psychiatric:  abnormal gait - bedridden, other - rigid


Musculoskeletal:  atrophy - BLE





Microbiology








 Date/Time


Source Procedure


Growth Status


 


 


 3/2/18 16:18


Sputum Gram Stain - Final Resulted


 


 3/2/18 16:18


Sputum Sputum Culture


Pending Resulted








Laboratory Tests


3/2/18 09:30: 


C-Reactive Protein, Quantitative > 70.0H, Vancomycin Level Trough 8.2


3/3/18 03:25: 


White Blood Count 5.5, Red Blood Count 4.15L, Hemoglobin 13.7L, Hematocrit 39.7L

, Mean Corpuscular Volume 96, Mean Corpuscular Hemoglobin 33.0H, Mean 

Corpuscular Hemoglobin Concent 34.5, Red Cell Distribution Width 12.2, Platelet 

Count 107L, Mean Platelet Volume 7.1, Neutrophils (%) (Auto) 76.7H, Lymphocytes 

(%) (Auto) 13.3L, Monocytes (%) (Auto) 8.4, Eosinophils (%) (Auto) 1.1, 

Basophils (%) (Auto) 0.5, Sodium Level 137, Potassium Level 3.7, Chloride Level 

102, Carbon Dioxide Level 32, Anion Gap 4L, Blood Urea Nitrogen 18, Creatinine 

1.1, Estimat Glomerular Filtration Rate , Glucose Level 236H, Lactic Acid Level 

2.60H, Uric Acid 2.1L, Calcium Level 8.9, Phosphorus Level 1.7L, Magnesium 

Level 1.9, Total Bilirubin 0.7, Aspartate Amino Transf (AST/SGOT) 17, Alanine 

Aminotransferase (ALT/SGPT) 20, Alkaline Phosphatase 100, Pro-B-Type 

Natriuretic Peptide 1057H, Total Protein 6.5, Albumin 2.1L, Globulin 4.4, 

Albumin/Globulin Ratio 0.5L





Current Medications








 Medications


  (Trade)  Dose


 Ordered  Sig/Shasta


 Route


 PRN Reason  Start Time


 Stop Time Status Last Admin


Dose Admin


 


 Acetaminophen


  (Tylenol)  650 mg  EVERY 6 HOURS  PRN


 GT


 Mild Pain/Temp > 100.5  2/28/18 01:15


 3/30/18 01:14  3/2/18 20:33


 


 


 Albuterol Sulfate


  (Proventil)  2.5 mg  Q4HRT


 HHN


   2/28/18 15:00


 3/5/18 14:59  3/3/18 03:33


 


 


 Carvedilol


  (Coreg)  12.5 mg  EVERY 12  HOURS


 GT


   3/2/18 21:00


 4/1/18 20:59  3/2/18 20:34


 


 


 Dextrose


  (Dextrose 50%)    STAT  PRN


 IV


 Hypoglycemia  3/2/18 12:00


 4/1/18 11:59   


 


 


 Famotidine


  (Pepcid)  20 mg  BID


 GT


   2/28/18 12:00


 3/30/18 11:59  3/2/18 17:48


 


 


 Furosemide


  (Lasix)  20 mg  DAILY


 IV


   3/2/18 09:00


 4/1/18 08:59  3/2/18 09:10


 


 


 Heparin Sodium


  (Porcine)


  (Heparin 5000


 units/ml)  5,000 units  EVERY 12  HOURS


 SUBQ


   2/27/18 21:00


 3/29/18 20:59   


 


 


 Insulin Aspart


  (NovoLOG)    EVERY 6  HOURS


 SUBQ


   3/2/18 12:00


 4/1/18 11:59  3/3/18 05:35


 


 


 Metoclopramide HCl


  (Reglan)  5 mg  EVERY 6  HOURS


 GT


   2/27/18 18:00


 3/29/18 17:59  3/3/18 05:34


 


 


 Piperacillin Sod/


 Tazobactam Sod


 3.375 gm/Sodium


 Chloride  110 ml @ 


 27.5 mls/hr  Q8HR


 IVPB


   2/27/18 15:00


 3/6/18 14:59  3/3/18 05:34


 


 


 Potassium Chloride


  (K-Dur)  40 meq  DAILY


 GT


   3/2/18 09:00


 4/1/18 08:59  3/2/18 09:13


 


 


 Promethazine HCl/


 Codeine


  (Phenergan with


 Codeine)  5 ml  Q4H  PRN


 ORAL


 For Cough  3/1/18 09:00


 3/31/18 08:59   


 


 


 Sennosides


  (Senokot)  2 tab  BEDTIME


 GT


   2/27/18 21:00


 3/29/18 20:59  3/1/18 20:23


 


 


 Sitagliptin


 Phosphate


  (Januvia)  100 mg  DAILY


 GT


   3/2/18 09:00


 4/1/18 08:59  3/2/18 09:13


 


 


 Theophylline


  (Theophylline)  80 mg  EVERY 12  HOURS


 GT


   3/2/18 21:00


 4/1/18 20:59  3/2/18 21:28


 


 


 Vancomycin HCl


  (Vanco rx to


 dose)  1 ea  DAILY  PRN


 MISC


 Per rx protocol  2/27/18 14:00


 3/29/18 13:59   


 


 


 Vancomycin HCl/


 Dextrose  250 ml @ 


 125 mls/hr  Q24H


 IVPB


   3/3/18 10:00


 3/8/18 09:59   


 

















Rashi (Fabiola)Mary NP Mar 3, 2018 07:19

## 2018-03-03 NOTE — GENERAL PROGRESS NOTE
Assessment/Plan


Problem List:  


(1) Pneumonia


ICD Codes:  J18.9 - Pneumonia, unspecified organism


SNOMED:  906763410


(2) Functional quadriplegia


ICD Codes:  R53.2 - Functional quadriplegia


SNOMED:  943972934453803


(3) Diabetes mellitus


ICD Codes:  E11.9 - Type 2 diabetes mellitus without complications


SNOMED:  01565695


(4) UTI (urinary tract infection)


ICD Codes:  N39.0 - Urinary tract infection, site not specified


SNOMED:  69111504


(5) Respiratory distress


ICD Codes:  R06.03 - Acute respiratory distress


SNOMED:  034374976


Status:  unchanged


Assessment/Plan





increase lasix IV


chest PT


K Phos supplement


increase coreg and 


antibiotics-


breathing treatment


start feeding Gt


increase Januvia


sliding scale insulin


2D echo


Left ventricular ejection fraction estimated to be 60-65%.


No evidence of  left ventricular hypertrophy.


poor prognosis





Subjective


ROS Limited/Unobtainable:  No


Constitutional:  Reports: malaise


Respiratory:  Reports: cough


Allergies:  


Coded Allergies:  


     No Known Allergies (Unverified , 3/23/16)





Objective





Last 24 Hour Vital Signs








  Date Time  Temp Pulse Resp B/P (MAP) Pulse Ox O2 Delivery O2 Flow Rate FiO2


 


3/3/18 11:40  95 22  93 Venturi Mask 6.0 35


 


3/3/18 08:32  108  131/67    


 


3/3/18 08:00  112      


 


3/3/18 08:00 97.9 108 20 131/67 91 Venturi Mask  35





 97.9       


 


3/3/18 07:44  108 20  96 Venturi Mask 6.0 45


 


3/3/18 07:43  108 20  96 Venturi Mask 6.0 45


 


3/3/18 07:33     92 Venturi Mask 6.0 35


 


3/3/18 07:33      Venturi Mask 6.0 35


 


3/3/18 07:33  107 22  94 Venturi Mask 6.0 35


 


3/3/18 04:00 98.4 98 20 135/72 99 Venturi Mask  30





 98.4       


 


3/3/18 04:00  94      


 


3/3/18 03:45  72 22  98 Venturi Mask 6.0 45


 


3/3/18 03:34  98 20  98 Venturi Mask 6.0 35


 


3/3/18 00:00 98.2 88 18 115/61 97 Venturi Mask  30





 98.2       


 


3/3/18 00:00  89      


 


3/2/18 22:56  92 22  98 Venturi Mask 6.0 45


 


3/2/18 22:50  79 20  96 Venturi Mask 6.0 45


 


3/2/18 21:03 99.0       


 


3/2/18 20:34  105  144/75    


 


3/2/18 20:33 100.9       


 


3/2/18 20:00 100.9 105 20 144/75 97 Venturi Mask  30





 100.9       


 


3/2/18 20:00  110      


 


3/2/18 19:56  97 22  95 Venturi Mask 4.0 30


 


3/2/18 19:46  101 20  95 Venturi Mask 3.0 40


 


3/2/18 19:45     95 Venturi Mask 4.0 30


 


3/2/18 19:45      Venturi Mask 4.0 30


 


3/2/18 16:00  100      


 


3/2/18 16:00 99.9 101 28 145/81 94 Venturi Mask  30





 99.9       


 


3/2/18 15:58  102 22  98 Venturi Mask 4.0 30


 


3/2/18 15:48  96 22  94 Venturi Mask 3.0 40


 


3/2/18 12:00 98.2 89 22 114/62 96 Venturi Mask  30





 98.2       


 


3/2/18 12:00  88      

















Intake and Output  


 


 3/2/18 3/3/18





 19:00 07:00


 


Intake Total 1315.000 ml 787.5 ml


 


Output Total 1550 ml 550 ml


 


Balance -235.000 ml 237.5 ml


 


  


 


Free Water 110 ml 


 


IV Total 745.000 ml 137.5 ml


 


Tube Feeding 460 ml 550 ml


 


Other  100 ml


 


Output Urine Total 1550 ml 550 ml


 


# Voids  1


 


# Bowel Movements  3








Laboratory Tests


3/3/18 03:25: 


White Blood Count 5.5, Red Blood Count 4.15L, Hemoglobin 13.7L, Hematocrit 39.7L

, Mean Corpuscular Volume 96, Mean Corpuscular Hemoglobin 33.0H, Mean 

Corpuscular Hemoglobin Concent 34.5, Red Cell Distribution Width 12.2, Platelet 

Count 107L, Mean Platelet Volume 7.1, Neutrophils (%) (Auto) 76.7H, Lymphocytes 

(%) (Auto) 13.3L, Monocytes (%) (Auto) 8.4, Eosinophils (%) (Auto) 1.1, 

Basophils (%) (Auto) 0.5, Sodium Level 137, Potassium Level 3.7, Chloride Level 

102, Carbon Dioxide Level 32, Anion Gap 4L, Blood Urea Nitrogen 18, Creatinine 

1.1, Estimat Glomerular Filtration Rate , Glucose Level 236H, Lactic Acid Level 

2.60H, Uric Acid 2.1L, Calcium Level 8.9, Phosphorus Level 1.7L, Magnesium 

Level 1.9, Total Bilirubin 0.7, Aspartate Amino Transf (AST/SGOT) 17, Alanine 

Aminotransferase (ALT/SGPT) 20, Alkaline Phosphatase 100, Pro-B-Type 

Natriuretic Peptide 1057H, Total Protein 6.5, Albumin 2.1L, Globulin 4.4, 

Albumin/Globulin Ratio 0.5L


3/3/18 09:00: Lactic Acid Level 1.40


Height (Feet):  5


Height (Inches):  7.00


Weight (Pounds):  172


Cardiovascular:  tachycardia


Respiratory/Chest:  decreased breath sounds, rhonchi - bilaterally


Objective


no other changes











NAY CALDWELL Mar 3, 2018 11:47

## 2018-03-03 NOTE — INFECTIOUS DISEASES PROG NOTE
Assessment/Plan


Problems:  


(1) HCAP (healthcare-associated pneumonia)


Assessment & Plan:  with right side lung infiltrates, suspect aspiration, 

continue  vancomycin and zosyn empiric coverage, pending sputum culture , 

monitor CXR 





(2) UTI (urinary tract infection)


Assessment & Plan:  await  urine culture and continue zosyn 





(3) Sepsis


Assessment & Plan:  due to the above, on vancomycin with zosyn empirically 

pending culture results 





(4) Fever


Assessment & Plan:  improving , due to the above, continue wide spectrum 

antibiotics, and tylenol as needed 





(5) Acute respiratory failure


Assessment & Plan:  due to the above, continue High flow oxygen via mask, and 

inhalers, monitor CXR 





(6) Diabetes mellitus


Assessment & Plan:  recommend tight glycemic control to keep blood glucose 

between 100-140 








Subjective


ROS Limited/Unobtainable:  Yes


Allergies:  


Coded Allergies:  


     No Known Allergies (Unverified , 3/23/16)


Subjective


he was comfortable, lying in bed, on venturi mask ,  alert, coughing ,  had  

fever  last night , no SOB, on high flow oxygen





Objective


Vital Signs





Last 24 Hour Vital Signs








  Date Time  Temp Pulse Resp B/P (MAP) Pulse Ox O2 Delivery O2 Flow Rate FiO2


 


3/3/18 12:00 97.5 98 20 128/74 95 Venturi Mask  35





 97.5       


 


3/3/18 12:00  97      


 


3/3/18 11:54  97 22  96 Venturi Mask 6.0 35


 


3/3/18 11:40  95 22  93 Venturi Mask 6.0 35


 


3/3/18 08:32  108  131/67    


 


3/3/18 08:00  112      


 


3/3/18 08:00 97.9 108 20 131/67 91 Venturi Mask  35





 97.9       


 


3/3/18 07:44  108 20  96 Venturi Mask 6.0 45


 


3/3/18 07:43  108 20  96 Venturi Mask 6.0 45


 


3/3/18 07:33     92 Venturi Mask 6.0 35


 


3/3/18 07:33      Venturi Mask 6.0 35


 


3/3/18 07:33  107 22  94 Venturi Mask 6.0 35


 


3/3/18 04:00 98.4 98 20 135/72 99 Venturi Mask  30





 98.4       


 


3/3/18 04:00  94      


 


3/3/18 03:45  72 22  98 Venturi Mask 6.0 45


 


3/3/18 03:34  98 20  98 Venturi Mask 6.0 35


 


3/3/18 00:00 98.2 88 18 115/61 97 Venturi Mask  30





 98.2       


 


3/3/18 00:00  89      


 


3/2/18 22:56  92 22  98 Venturi Mask 6.0 45


 


3/2/18 22:50  79 20  96 Venturi Mask 6.0 45


 


3/2/18 21:03 99.0       


 


3/2/18 20:34  105  144/75    


 


3/2/18 20:33 100.9       


 


3/2/18 20:00 100.9 105 20 144/75 97 Venturi Mask  30





 100.9       


 


3/2/18 20:00  110      


 


3/2/18 19:56  97 22  95 Venturi Mask 4.0 30


 


3/2/18 19:46  101 20  95 Venturi Mask 3.0 40


 


3/2/18 19:45     95 Venturi Mask 4.0 30


 


3/2/18 19:45      Venturi Mask 4.0 30


 


3/2/18 16:00  100      


 


3/2/18 16:00 99.9 101 28 145/81 94 Venturi Mask  30





 99.9       


 


3/2/18 15:58  102 22  98 Venturi Mask 4.0 30


 


3/2/18 15:48  96 22  94 Venturi Mask 3.0 40








Height (Feet):  5


Height (Inches):  7.00


Weight (Pounds):  172


General Appearance:  WD/WN, no acute distress


HEENT:  normocephalic, atraumatic, anicteric, mucous membranes moist, PERRL


Respiratory/Chest:  chest wall non-tender, no respiratory distress, no 

accessory muscle use, decreased breath sounds, crackles/rales, expiratory 

wheezing


Cardiovascular:  normal peripheral pulses, normal rate, regular rhythm, no 

gallop/murmur, no JVD


Abdomen:  normal bowel sounds, soft, non tender, no organomegaly, non distended

, no mass, no scars


Extremities:  no cyanosis, no clubbing


Skin:  no rash, no lesions, no ulcers


Neurologic/Psychiatric:  alert





Microbiology








 Date/Time


Source Procedure


Growth Status


 


 


 3/2/18 16:18


Sputum Gram Stain - Final Resulted


 


 3/2/18 16:18


Sputum Sputum Culture


Pending Resulted


 


 3/2/18 16:18


Indwelling Cath Urine Culture - Preliminary


NO GROWTH Resulted











Laboratory Tests








Test


  3/3/18


03:25 3/3/18


09:00


 


White Blood Count


  5.5 K/UL


(4.8-10.8) 


 


 


Red Blood Count


  4.15 M/UL


(4.70-6.10)  L 


 


 


Hemoglobin


  13.7 G/DL


(14.2-18.0)  L 


 


 


Hematocrit


  39.7 %


(42.0-52.0)  L 


 


 


Mean Corpuscular Volume 96 FL (80-99)   


 


Mean Corpuscular Hemoglobin


  33.0 PG


(27.0-31.0)  H 


 


 


Mean Corpuscular Hemoglobin


Concent 34.5 G/DL


(32.0-36.0) 


 


 


Red Cell Distribution Width


  12.2 %


(11.6-14.8) 


 


 


Platelet Count


  107 K/UL


(150-450)  L 


 


 


Mean Platelet Volume


  7.1 FL


(6.5-10.1) 


 


 


Neutrophils (%) (Auto)


  76.7 %


(45.0-75.0)  H 


 


 


Lymphocytes (%) (Auto)


  13.3 %


(20.0-45.0)  L 


 


 


Monocytes (%) (Auto)


  8.4 %


(1.0-10.0) 


 


 


Eosinophils (%) (Auto)


  1.1 %


(0.0-3.0) 


 


 


Basophils (%) (Auto)


  0.5 %


(0.0-2.0) 


 


 


Sodium Level


  137 MMOL/L


(136-145) 


 


 


Potassium Level


  3.7 MMOL/L


(3.5-5.1) 


 


 


Chloride Level


  102 MMOL/L


() 


 


 


Carbon Dioxide Level


  32 MMOL/L


(21-32) 


 


 


Anion Gap


  4 mmol/L


(5-15)  L 


 


 


Blood Urea Nitrogen


  18 mg/dL


(7-18) 


 


 


Creatinine


  1.1 MG/DL


(0.55-1.30) 


 


 


Estimat Glomerular Filtration


Rate  mL/min (>60)  


  


 


 


Glucose Level


  236 MG/DL


()  H 


 


 


Lactic Acid Level


  2.60 mmol/L


(0.66-2.22)  H 1.40 mmol/L


(0.66-2.22)


 


Uric Acid


  2.1 MG/DL


(2.6-7.2)  L 


 


 


Calcium Level


  8.9 MG/DL


(8.5-10.1) 


 


 


Phosphorus Level


  1.7 MG/DL


(2.5-4.9)  L 


 


 


Magnesium Level


  1.9 MG/DL


(1.8-2.4) 


 


 


Total Bilirubin


  0.7 MG/DL


(0.2-1.0) 


 


 


Aspartate Amino Transf


(AST/SGOT) 17 U/L (15-37)


  


 


 


Alanine Aminotransferase


(ALT/SGPT) 20 U/L (12-78)


  


 


 


Alkaline Phosphatase


  100 U/L


() 


 


 


Pro-B-Type Natriuretic Peptide


  1057 pg/mL


(0-125)  H 


 


 


Total Protein


  6.5 G/DL


(6.4-8.2) 


 


 


Albumin


  2.1 G/DL


(3.4-5.0)  L 


 


 


Globulin 4.4 g/dL   


 


Albumin/Globulin Ratio


  0.5 (1.0-2.7)


L 


 











Current Medications








 Medications


  (Trade)  Dose


 Ordered  Sig/Shasta


 Route


 PRN Reason  Start Time


 Stop Time Status Last Admin


Dose Admin


 


 Acetaminophen


  (Tylenol)  650 mg  EVERY 6 HOURS  PRN


 GT


 Mild Pain/Temp > 100.5  2/28/18 01:15


 3/30/18 01:14  3/2/18 20:33


 


 


 Albuterol Sulfate


  (Proventil)  2.5 mg  Q4HRT


 N


   2/28/18 15:00


 3/5/18 14:59  3/3/18 11:40


 


 


 Albuterol/


 Ipratropium


  (Albuterol/


 Ipratropium)  3 ml  Q4H  PRN


 HHN


 Shortness of Breath  3/3/18 12:15


 3/8/18 12:14   


 


 


 Albuterol/


 Ipratropium


  (Albuterol/


 Ipratropium)  3 ml  TIDRT


 HHN


   3/3/18 13:00


 3/8/18 12:59   


 


 


 Carvedilol


  (Coreg)  12.5 mg  EVERY 12  HOURS


 GT


   3/2/18 21:00


 4/1/18 20:59  3/3/18 08:32


 


 


 Dextrose


  (Dextrose 50%)    STAT  PRN


 IV


 Hypoglycemia  3/2/18 12:00


 4/1/18 11:59   


 


 


 Famotidine


  (Pepcid)  20 mg  BID


 GT


   2/28/18 12:00


 3/30/18 11:59  3/3/18 08:31


 


 


 Furosemide


  (Lasix)  40 mg  DAILY


 IV


   3/4/18 09:00


 4/3/18 08:59   


 


 


 Heparin Sodium


  (Porcine)


  (Heparin 5000


 units/ml)  5,000 units  EVERY 12  HOURS


 SUBQ


   2/27/18 21:00


 3/29/18 20:59   


 


 


 Insulin Aspart


  (NovoLOG)    EVERY 6  HOURS


 SUBQ


   3/2/18 12:00


 4/1/18 11:59  3/3/18 11:37


 


 


 Metoclopramide HCl


  (Reglan)  5 mg  EVERY 6  HOURS


 GT


   2/27/18 18:00


 3/29/18 17:59  3/3/18 11:35


 


 


 Piperacillin Sod/


 Tazobactam Sod


 3.375 gm/Sodium


 Chloride  110 ml @ 


 27.5 mls/hr  Q8HR


 IVPB


   2/27/18 15:00


 3/6/18 14:59  3/3/18 13:57


 


 


 Potassium


 Phosphate 30 mm/


 Sodium Chloride  285 ml @ 


 47.5 mls/hr  ONCE  ONCE


 IV


   3/3/18 10:30


 3/3/18 16:29  3/3/18 11:14


 


 


 Potassium Chloride


  (K-Dur)  40 meq  BID


 GT


   3/3/18 18:00


 4/1/18 08:59   


 


 


 Promethazine HCl/


 Codeine


  (Phenergan with


 Codeine)  5 ml  Q4H  PRN


 ORAL


 For Cough  3/1/18 09:00


 3/31/18 08:59   


 


 


 Sennosides


  (Senokot)  2 tab  BEDTIME


 GT


   2/27/18 21:00


 3/29/18 20:59  3/1/18 20:23


 


 


 Sitagliptin


 Phosphate


  (Januvia)  100 mg  DAILY


 GT


   3/2/18 09:00


 4/1/18 08:59  3/3/18 08:32


 


 


 Theophylline


  (Theophylline)  80 mg  EVERY 12  HOURS


 GT


   3/2/18 21:00


 4/1/18 20:59  3/3/18 08:31


 


 


 Vancomycin HCl


  (Vanco rx to


 dose)  1 ea  DAILY  PRN


 MISC


 Per rx protocol  2/27/18 14:00


 3/29/18 13:59   


 


 


 Vancomycin HCl/


 Dextrose  250 ml @ 


 125 mls/hr  Q24H


 IVPB


   3/3/18 10:00


 3/8/18 09:59  3/3/18 10:42


 

















Amira Gross M.D. Mar 3, 2018 14:56

## 2018-03-04 VITALS — DIASTOLIC BLOOD PRESSURE: 63 MMHG | SYSTOLIC BLOOD PRESSURE: 116 MMHG

## 2018-03-04 VITALS — DIASTOLIC BLOOD PRESSURE: 63 MMHG | SYSTOLIC BLOOD PRESSURE: 118 MMHG

## 2018-03-04 VITALS — SYSTOLIC BLOOD PRESSURE: 131 MMHG | DIASTOLIC BLOOD PRESSURE: 57 MMHG

## 2018-03-04 VITALS — SYSTOLIC BLOOD PRESSURE: 132 MMHG | DIASTOLIC BLOOD PRESSURE: 74 MMHG

## 2018-03-04 VITALS — DIASTOLIC BLOOD PRESSURE: 55 MMHG | SYSTOLIC BLOOD PRESSURE: 106 MMHG

## 2018-03-04 VITALS — DIASTOLIC BLOOD PRESSURE: 79 MMHG | SYSTOLIC BLOOD PRESSURE: 128 MMHG

## 2018-03-04 LAB
ADD MANUAL DIFF: NO
ALBUMIN SERPL-MCNC: 1.8 G/DL (ref 3.4–5)
ALBUMIN/GLOB SERPL: 0.4 {RATIO} (ref 1–2.7)
ALP SERPL-CCNC: 90 U/L (ref 46–116)
ALT SERPL-CCNC: 55 U/L (ref 12–78)
ANION GAP SERPL CALC-SCNC: 3 MMOL/L (ref 5–15)
AST SERPL-CCNC: 60 U/L (ref 15–37)
BASOPHILS NFR BLD AUTO: 0.7 % (ref 0–2)
BILIRUB SERPL-MCNC: 0.6 MG/DL (ref 0.2–1)
BUN SERPL-MCNC: 22 MG/DL (ref 7–18)
CALCIUM SERPL-MCNC: 8.9 MG/DL (ref 8.5–10.1)
CHLORIDE SERPL-SCNC: 106 MMOL/L (ref 98–107)
CO2 SERPL-SCNC: 31 MMOL/L (ref 21–32)
CREAT SERPL-MCNC: 1 MG/DL (ref 0.55–1.3)
EOSINOPHIL NFR BLD AUTO: 0.7 % (ref 0–3)
ERYTHROCYTE [DISTWIDTH] IN BLOOD BY AUTOMATED COUNT: 12.4 % (ref 11.6–14.8)
GLOBULIN SER-MCNC: 4.2 G/DL
HCT VFR BLD CALC: 35.3 % (ref 42–52)
HGB BLD-MCNC: 12.2 G/DL (ref 14.2–18)
LYMPHOCYTES NFR BLD AUTO: 15.7 % (ref 20–45)
MCV RBC AUTO: 96 FL (ref 80–99)
MONOCYTES NFR BLD AUTO: 9.4 % (ref 1–10)
NEUTROPHILS NFR BLD AUTO: 73.6 % (ref 45–75)
PHOSPHATE SERPL-MCNC: 1.7 MG/DL (ref 2.5–4.9)
PLATELET # BLD: 118 K/UL (ref 150–450)
POTASSIUM SERPL-SCNC: 4.1 MMOL/L (ref 3.5–5.1)
RBC # BLD AUTO: 3.68 M/UL (ref 4.7–6.1)
SODIUM SERPL-SCNC: 140 MMOL/L (ref 136–145)
WBC # BLD AUTO: 4.3 K/UL (ref 4.8–10.8)

## 2018-03-04 RX ADMIN — SITAGLIPTIN SCH MG: 50 TABLET, FILM COATED ORAL at 08:56

## 2018-03-04 RX ADMIN — INSULIN ASPART SCH UNITS: 100 INJECTION, SOLUTION INTRAVENOUS; SUBCUTANEOUS at 05:27

## 2018-03-04 RX ADMIN — HEPARIN SODIUM SCH UNITS: 5000 INJECTION INTRAVENOUS; SUBCUTANEOUS at 21:00

## 2018-03-04 RX ADMIN — DEXTROSE MONOHYDRATE SCH MLS/HR: 50 INJECTION, SOLUTION INTRAVENOUS at 13:04

## 2018-03-04 RX ADMIN — CARVEDILOL SCH MG: 6.25 TABLET, FILM COATED ORAL at 08:57

## 2018-03-04 RX ADMIN — IPRATROPIUM BROMIDE AND ALBUTEROL SULFATE SCH ML: .5; 3 SOLUTION RESPIRATORY (INHALATION) at 07:17

## 2018-03-04 RX ADMIN — IPRATROPIUM BROMIDE AND ALBUTEROL SULFATE SCH ML: .5; 3 SOLUTION RESPIRATORY (INHALATION) at 11:12

## 2018-03-04 RX ADMIN — INSULIN ASPART SCH UNITS: 100 INJECTION, SOLUTION INTRAVENOUS; SUBCUTANEOUS at 11:52

## 2018-03-04 RX ADMIN — HEPARIN SODIUM SCH UNITS: 5000 INJECTION INTRAVENOUS; SUBCUTANEOUS at 08:58

## 2018-03-04 RX ADMIN — METOCLOPRAMIDE HYDROCHLORIDE SCH MG: 5 SOLUTION ORAL at 00:09

## 2018-03-04 RX ADMIN — METOCLOPRAMIDE HYDROCHLORIDE SCH MG: 5 SOLUTION ORAL at 17:12

## 2018-03-04 RX ADMIN — METOCLOPRAMIDE HYDROCHLORIDE SCH MG: 5 SOLUTION ORAL at 11:48

## 2018-03-04 RX ADMIN — DEXTROSE MONOHYDRATE SCH MLS/HR: 50 INJECTION, SOLUTION INTRAVENOUS at 21:32

## 2018-03-04 RX ADMIN — DEXTROSE MONOHYDRATE SCH MLS/HR: 50 INJECTION, SOLUTION INTRAVENOUS at 05:26

## 2018-03-04 RX ADMIN — CARVEDILOL SCH MG: 6.25 TABLET, FILM COATED ORAL at 21:17

## 2018-03-04 RX ADMIN — INSULIN ASPART SCH UNITS: 100 INJECTION, SOLUTION INTRAVENOUS; SUBCUTANEOUS at 00:10

## 2018-03-04 RX ADMIN — IPRATROPIUM BROMIDE AND ALBUTEROL SULFATE SCH ML: .5; 3 SOLUTION RESPIRATORY (INHALATION) at 20:20

## 2018-03-04 RX ADMIN — METOCLOPRAMIDE HYDROCHLORIDE SCH MG: 5 SOLUTION ORAL at 05:26

## 2018-03-04 RX ADMIN — INSULIN ASPART SCH UNITS: 100 INJECTION, SOLUTION INTRAVENOUS; SUBCUTANEOUS at 17:15

## 2018-03-04 RX ADMIN — Medication SCH MLS/HR: at 10:31

## 2018-03-04 RX ADMIN — Medication SCH TAB: at 21:18

## 2018-03-04 NOTE — GENERAL PROGRESS NOTE
Assessment/Plan


Problem List:  


(1) Pneumonia


ICD Codes:  J18.9 - Pneumonia, unspecified organism


SNOMED:  230533596


(2) Functional quadriplegia


ICD Codes:  R53.2 - Functional quadriplegia


SNOMED:  615643908403183


(3) Diabetes mellitus


ICD Codes:  E11.9 - Type 2 diabetes mellitus without complications


SNOMED:  21473408


(4) UTI (urinary tract infection)


ICD Codes:  N39.0 - Urinary tract infection, site not specified


SNOMED:  06270216


(5) Respiratory distress


ICD Codes:  R06.03 - Acute respiratory distress


SNOMED:  731996392


Status Narrative


improved


Assessment/Plan





increase lasix IV


chest PT


K Phos supplement


 coreg 


antibiotics-


breathing treatment


start feeding Gt


increase Januvia


sliding scale insulin


DC in 1-2 days





2D echo


Left ventricular ejection fraction estimated to be 60-65%.


No evidence of  left ventricular hypertrophy.


poor prognosis





Subjective


ROS Limited/Unobtainable:  No


Constitutional:  Reports: malaise


Allergies:  


Coded Allergies:  


     No Known Allergies (Unverified , 3/23/16)





Objective





Last 24 Hour Vital Signs








  Date Time  Temp Pulse Resp B/P (MAP) Pulse Ox O2 Delivery O2 Flow Rate FiO2


 


3/4/18 08:57  99  131/57    


 


3/4/18 08:00 97.7 99 18 131/57 95 Venturi Mask  35





 97.7       


 


3/4/18 07:24  98 22  96 Venturi Mask 6.0 35


 


3/4/18 07:21     94 Venturi Mask 6.0 35


 


3/4/18 07:21      Venturi Mask 6.0 35


 


3/4/18 07:17  95 20  94 Venturi Mask 6.0 35


 


3/4/18 04:00 97.9 78 18 128/79 94 Venturi Mask  35





 97.9       


 


3/4/18 04:00  97      


 


3/4/18 00:00  97      


 


3/4/18 00:00 98.2 85 18 132/74 94 Venturi Mask  35





 98.2       


 


3/3/18 20:41  98 23  96 Venturi Mask 6.0 35


 


3/3/18 20:26  98 23  95 Venturi Mask 6.0 35


 


3/3/18 20:26        35


 


3/3/18 20:23  98  141/72    


 


3/3/18 20:00 98.8 98 20 136/72 98 Venturi Mask  35





 98.8       


 


3/3/18 20:00  100      


 


3/3/18 18:42     95 Venturi Mask 6.0 35


 


3/3/18 18:42      Venturi Mask 6.0 35


 


3/3/18 16:00  100      


 


3/3/18 16:00 98.9 105 23 134/68 94 Venturi Mask  35





 98.9       


 


3/3/18 15:39  98 24  93 Venturi Mask 6.0 35


 


3/3/18 15:30  101 22  91 Venturi Mask 6.0 35


 


3/3/18 12:00 97.5 98 20 128/74 95 Venturi Mask  35





 97.5       


 


3/3/18 12:00  97      


 


3/3/18 11:54  97 22  96 Venturi Mask 6.0 45


 


3/3/18 11:40  95 22  93 Venturi Mask 6.0 35

















Intake and Output  


 


 3/3/18 3/4/18





 19:00 07:00


 


Intake Total 1255.0 ml 837.5 ml


 


Output Total 1050 ml 850 ml


 


Balance 205.0 ml -12.5 ml


 


  


 


Free Water  50 ml


 


IV Total 625.0 ml 137.5 ml


 


Tube Feeding 600 ml 650 ml


 


Other 30 ml 


 


Output Urine Total 1000 ml 800 ml


 


Stool Total 50 ml 50 ml


 


# Bowel Movements 2 








Laboratory Tests


3/4/18 06:20: 


White Blood Count 4.3L, Red Blood Count 3.68L, Hemoglobin 12.2L, Hematocrit 

35.3L, Mean Corpuscular Volume 96, Mean Corpuscular Hemoglobin 33.3H, Mean 

Corpuscular Hemoglobin Concent 34.7, Red Cell Distribution Width 12.4, Platelet 

Count 118L, Mean Platelet Volume 6.5, Neutrophils (%) (Auto) 73.6, Lymphocytes (

%) (Auto) 15.7L, Monocytes (%) (Auto) 9.4, Eosinophils (%) (Auto) 0.7, 

Basophils (%) (Auto) 0.7, Sodium Level 140, Potassium Level 4.1, Chloride Level 

106, Carbon Dioxide Level 31, Anion Gap 3L, Blood Urea Nitrogen 22H, Creatinine 

1.0, Estimat Glomerular Filtration Rate , Glucose Level 326H, Uric Acid 2.1L, 

Calcium Level 8.9, Phosphorus Level 1.7L, Magnesium Level 2.0, Total Bilirubin 

0.6, Aspartate Amino Transf (AST/SGOT) 60H, Alanine Aminotransferase (ALT/SGPT) 

55, Alkaline Phosphatase 90, C-Reactive Protein, Quantitative 22.8H, Pro-B-Type 

Natriuretic Peptide 548H, Total Protein 6.0L, Albumin 1.8L, Globulin 4.2, 

Albumin/Globulin Ratio 0.4L


Height (Feet):  5


Height (Inches):  7.00


Weight (Pounds):  172


General Appearance:  no apparent distress


Cardiovascular:  tachycardia


Respiratory/Chest:  decreased breath sounds, other - breathing improved


Objective


no other changes











NAY CALDWELL Mar 4, 2018 10:13

## 2018-03-04 NOTE — PULMONOLOGY PROGRESS NOTE
Assessment/Plan


Assessment/Plan


ASSESSMENT


Acute hypoxemic respiratory failure


HCAP 


possible sepsis 


dysphagia G tube


DM 


Functional quadriplegia 


Sacrococcyx DTI POA 


L heel DTI POA 





PLAN OF CARE


JULIENNE


O2 titrate to keep sat above 92%


Pulmonary toilet CPT with HHN ATC and HHN prn -continue


abx ID flows 


blood and sputum cx negative 


influenza screen negative 


a/tussive prn 


Theophylline 


BS management with Januvia and SSI


ECHO with pEF 60-65%, aortic sclerosis


gentle diuresis 


monitor cardiorenal parameters , lytes ; correct lytes as needed 


Wound care as per wound nurse recs


DVT GI prophylaxis


DNI status  





case discussed and evaluated by supervising physician





Subjective


Allergies:  


Coded Allergies:  


     No Known Allergies (Unverified , 3/23/16)


Subjective


less congested


afebrile , no leukocytosis





Objective





Last 24 Hour Vital Signs








  Date Time  Temp Pulse Resp B/P (MAP) Pulse Ox O2 Delivery O2 Flow Rate FiO2


 


3/4/18 11:19  98 22  96 Nasal Cannula 4.0 36


 


3/4/18 11:12  95 20  95 Venturi Mask 6.0 35


 


3/4/18 08:57  99  131/57    


 


3/4/18 08:00 97.7 99 18 131/57 95 Venturi Mask  35





 97.7       


 


3/4/18 08:00  98      


 


3/4/18 07:24  98 22  96 Venturi Mask 6.0 35


 


3/4/18 07:21     94 Venturi Mask 6.0 35


 


3/4/18 07:21      Venturi Mask 6.0 35


 


3/4/18 07:17  95 20  94 Venturi Mask 6.0 35


 


3/4/18 04:00 97.9 78 18 128/79 94 Venturi Mask  35





 97.9       


 


3/4/18 04:00  97      


 


3/4/18 00:00  97      


 


3/4/18 00:00 98.2 85 18 132/74 94 Venturi Mask  35





 98.2       


 


3/3/18 20:41  98 23  96 Venturi Mask 6.0 35


 


3/3/18 20:26  98 23  95 Venturi Mask 6.0 35


 


3/3/18 20:26        35


 


3/3/18 20:23  98  141/72    


 


3/3/18 20:00 98.8 98 20 136/72 98 Venturi Mask  35





 98.8       


 


3/3/18 20:00  100      


 


3/3/18 18:42     95 Venturi Mask 6.0 35


 


3/3/18 18:42      Venturi Mask 6.0 35


 


3/3/18 16:00  100      


 


3/3/18 16:00 98.9 105 23 134/68 94 Venturi Mask  35





 98.9       


 


3/3/18 15:39  98 24  93 Venturi Mask 6.0 35


 


3/3/18 15:30  101 22  91 Venturi Mask 6.0 35


 


3/3/18 12:00 97.5 98 20 128/74 95 Venturi Mask  35





 97.5       


 


3/3/18 12:00  97      


 


3/3/18 11:54  97 22  96 Venturi Mask 6.0 45

















Intake and Output  


 


 3/3/18 3/4/18





 19:00 07:00


 


Intake Total 1255.0 ml 837.5 ml


 


Output Total 1050 ml 850 ml


 


Balance 205.0 ml -12.5 ml


 


  


 


Free Water  50 ml


 


IV Total 625.0 ml 137.5 ml


 


Tube Feeding 600 ml 650 ml


 


Other 30 ml 


 


Output Urine Total 1000 ml 800 ml


 


Stool Total 50 ml 50 ml


 


# Bowel Movements 2 








Objective


General Appearance:  other - mild resp distress, poorly responsive elderly 

bedridden male


HEENT:  normocephalic, atraumatic, anicteric


Respiratory/Chest:  few  crackles, overall much clear


Cardiovascular:  regular rhythm - ST o tele , tachycardia


Abdomen:  normal bowel sounds, soft, non tender


Extremities:  no edema, pedal pulses normal


Neurologic/Psychiatric:  abnormal gait - bedridden, other - rigid


Musculoskeletal:  atrophy - BLE





Microbiology








 Date/Time


Source Procedure


Growth Status


 


 


 3/2/18 16:18


Sputum Gram Stain - Final Resulted


 


 3/2/18 16:18


Sputum Sputum Culture


Pending Resulted


 


 3/2/18 16:18


Indwelling Cath Urine Culture - Preliminary


NO GROWTH AFTER 24 HOURS Resulted








Laboratory Tests


3/4/18 06:20: 


White Blood Count 4.3L, Red Blood Count 3.68L, Hemoglobin 12.2L, Hematocrit 

35.3L, Mean Corpuscular Volume 96, Mean Corpuscular Hemoglobin 33.3H, Mean 

Corpuscular Hemoglobin Concent 34.7, Red Cell Distribution Width 12.4, Platelet 

Count 118L, Mean Platelet Volume 6.5, Neutrophils (%) (Auto) 73.6, Lymphocytes (

%) (Auto) 15.7L, Monocytes (%) (Auto) 9.4, Eosinophils (%) (Auto) 0.7, 

Basophils (%) (Auto) 0.7, Sodium Level 140, Potassium Level 4.1, Chloride Level 

106, Carbon Dioxide Level 31, Anion Gap 3L, Blood Urea Nitrogen 22H, Creatinine 

1.0, Estimat Glomerular Filtration Rate , Glucose Level 326H, Uric Acid 2.1L, 

Calcium Level 8.9, Phosphorus Level 1.7L, Magnesium Level 2.0, Total Bilirubin 

0.6, Aspartate Amino Transf (AST/SGOT) 60H, Alanine Aminotransferase (ALT/SGPT) 

55, Alkaline Phosphatase 90, C-Reactive Protein, Quantitative 22.8H, Pro-B-Type 

Natriuretic Peptide 548H, Total Protein 6.0L, Albumin 1.8L, Globulin 4.2, 

Albumin/Globulin Ratio 0.4L





Current Medications








 Medications


  (Trade)  Dose


 Ordered  Sig/[place]


 Route


 PRN Reason  Start Time


 Stop Time Status Last Admin


Dose Admin


 


 Acetaminophen


  (Tylenol)  650 mg  EVERY 6 HOURS  PRN


 GT


 Mild Pain/Temp > 100.5  2/28/18 01:15


 3/30/18 01:14  3/2/18 20:33


 


 


 Albuterol/


 Ipratropium


  (Albuterol/


 Ipratropium)  3 ml  Q4H  PRN


 HHN


 Shortness of Breath  3/3/18 12:15


 3/8/18 12:14   


 


 


 Albuterol/


 Ipratropium


  (Albuterol/


 Ipratropium)  3 ml  TIDRT


 HHN


   3/3/18 13:00


 3/8/18 12:59  3/4/18 11:12


 


 


 Carvedilol


  (Coreg)  12.5 mg  EVERY 12  HOURS


 GT


   3/2/18 21:00


 4/1/18 20:59  3/4/18 08:57


 


 


 Dextrose


  (Dextrose 50%)    STAT  PRN


 IV


 Hypoglycemia  3/2/18 12:00


 4/1/18 11:59   


 


 


 Famotidine


  (Pepcid)  20 mg  BID


 GT


   2/28/18 12:00


 3/30/18 11:59  3/4/18 08:56


 


 


 Furosemide


  (Lasix)  40 mg  DAILY


 IV


   3/4/18 09:00


 4/3/18 08:59  3/4/18 08:58


 


 


 Heparin Sodium


  (Porcine)


  (Heparin 5000


 units/ml)  5,000 units  EVERY 12  HOURS


 SUBQ


   2/27/18 21:00


 3/29/18 20:59   


 


 


 Insulin Aspart


  (NovoLOG)    EVERY 6  HOURS


 SUBQ


   3/2/18 12:00


 4/1/18 11:59  3/4/18 05:27


 


 


 Metoclopramide HCl


  (Reglan)  5 mg  EVERY 6  HOURS


 GT


   2/27/18 18:00


 3/29/18 17:59  3/4/18 05:26


 


 


 Piperacillin Sod/


 Tazobactam Sod


 3.375 gm/Sodium


 Chloride  110 ml @ 


 27.5 mls/hr  Q8HR


 IVPB


   2/27/18 15:00


 3/6/18 14:59  3/4/18 05:26


 


 


 Potassium


 Phosphate 30 mm/


 Sodium Chloride  285 ml @ 


 47.5 mls/hr  ONCE  ONCE


 IV


   3/4/18 10:30


 3/4/18 16:29  3/4/18 11:11


 


 


 Promethazine HCl/


 Codeine


  (Phenergan with


 Codeine)  5 ml  Q4H  PRN


 ORAL


 For Cough  3/1/18 09:00


 3/31/18 08:59   


 


 


 Sennosides


  (Senokot)  2 tab  BEDTIME


 GT


   2/27/18 21:00


 3/29/18 20:59  3/1/18 20:23


 


 


 Sitagliptin


 Phosphate


  (Januvia)  100 mg  DAILY


 GT


   3/2/18 09:00


 4/1/18 08:59  3/4/18 08:56


 


 


 Theophylline


  (Theophylline)  80 mg  EVERY 12  HOURS


 GT


   3/2/18 21:00


 4/1/18 20:59  3/4/18 08:57


 


 


 Vancomycin HCl


  (Vanco rx to


 dose)  1 ea  DAILY  PRN


 MISC


 Per rx protocol  2/27/18 14:00


 3/29/18 13:59   


 


 


 Vancomycin HCl/


 Dextrose  250 ml @ 


 125 mls/hr  Q24H


 IVPB


   3/3/18 10:00


 3/8/18 09:59  3/4/18 10:31


 

















Mary Markham NP ([facility]) Mar 4, 2018 11:44

## 2018-03-04 NOTE — INFECTIOUS DISEASES PROG NOTE
Assessment/Plan


Problems:  


(1) HCAP (healthcare-associated pneumonia)


Assessment & Plan:  with right side lung infiltrates, suspect aspiration, 

continue  vancomycin and zosyn empiric coverage, pending sputum culture , 

monitor CXR 





(2) UTI (urinary tract infection)


Assessment & Plan:  await  urine culture and continue zosyn 





(3) Sepsis


Assessment & Plan:  due to the above, on vancomycin with zosyn empirically 

pending culture results 





(4) Fever


Assessment & Plan:  improving , due to the above, continue wide spectrum 

antibiotics, and tylenol as needed 





(5) Acute respiratory failure


Assessment & Plan:  due to the above, continue High flow oxygen via mask, and 

inhalers, monitor CXR 





(6) Diabetes mellitus


Assessment & Plan:  recommend tight glycemic control to keep blood glucose 

between 100-140 








Subjective


ROS Limited/Unobtainable:  Yes


Allergies:  


Coded Allergies:  


     No Known Allergies (Unverified , 3/23/16)


Subjective


he was comfortable, lying in bed, on venturi mask ,  alert,  had low grade  

fever  last night , no SOB, on oxygen via nasal canula





Objective


Vital Signs





Last 24 Hour Vital Signs








  Date Time  Temp Pulse Resp B/P (MAP) Pulse Ox O2 Delivery O2 Flow Rate FiO2


 


3/4/18 13:11      Nasal Cannula 5.0 40


 


3/4/18 13:10      Nasal Cannula 5.0 40


 


3/4/18 12:59  97      


 


3/4/18 12:00 99.7 95 23 106/55 95 Nasal Cannula  





 99.7       


 


3/4/18 11:19  98 22  96 Nasal Cannula 4.0 36


 


3/4/18 11:12  95 20  95 Venturi Mask 6.0 35


 


3/4/18 08:57  99  131/57    


 


3/4/18 08:00 97.7 99 18 131/57 95 Venturi Mask  35





 97.7       


 


3/4/18 08:00  98      


 


3/4/18 07:24  98 22  96 Venturi Mask 6.0 35


 


3/4/18 07:21     94 Venturi Mask 6.0 35


 


3/4/18 07:21      Venturi Mask 6.0 35


 


3/4/18 07:17  95 20  94 Venturi Mask 6.0 35


 


3/4/18 04:00 97.9 78 18 128/79 94 Venturi Mask  35





 97.9       


 


3/4/18 04:00  97      


 


3/4/18 00:00  97      


 


3/4/18 00:00 98.2 85 18 132/74 94 Venturi Mask  35





 98.2       


 


3/3/18 20:41  98 23  96 Venturi Mask 6.0 35


 


3/3/18 20:26  98 23  95 Venturi Mask 6.0 35


 


3/3/18 20:26        35


 


3/3/18 20:23  98  141/72    


 


3/3/18 20:00 98.8 98 20 136/72 98 Venturi Mask  35





 98.8       


 


3/3/18 20:00  100      


 


3/3/18 18:42     95 Venturi Mask 6.0 35


 


3/3/18 18:42      Venturi Mask 6.0 35


 


3/3/18 16:00  100      


 


3/3/18 16:00 98.9 105 23 134/68 94 Venturi Mask  35





 98.9       


 


3/3/18 15:39  98 24  93 Venturi Mask 6.0 35


 


3/3/18 15:30  101 22  91 Venturi Mask 6.0 35








Height (Feet):  5


Height (Inches):  7.00


Weight (Pounds):  172


General Appearance:  WD/WN, no acute distress


HEENT:  normocephalic, atraumatic, anicteric, mucous membranes moist, PERRL, 

EOMI, pharynx normal


Respiratory/Chest:  chest wall non-tender, lungs clear, normal breath sounds, 

no respiratory distress, no accessory muscle use


Cardiovascular:  normal peripheral pulses, normal rate, regular rhythm, no 

gallop/murmur, no JVD


Abdomen:  normal bowel sounds, soft, non tender, no organomegaly, non distended

, no mass, no scars


Extremities:  no cyanosis, no clubbing


Skin:  no rash, no lesions


Neurologic/Psychiatric:  responsive





Microbiology








 Date/Time


Source Procedure


Growth Status


 


 


 3/2/18 16:18


Sputum Gram Stain - Final Resulted


 


 3/2/18 16:18


Sputum Sputum Culture


Pending Resulted


 


 3/2/18 16:18


Indwelling Cath Urine Culture - Preliminary


NO GROWTH AFTER 24 HOURS Resulted











Laboratory Tests








Test


  3/4/18


06:20


 


White Blood Count


  4.3 K/UL


(4.8-10.8)  L


 


Red Blood Count


  3.68 M/UL


(4.70-6.10)  L


 


Hemoglobin


  12.2 G/DL


(14.2-18.0)  L


 


Hematocrit


  35.3 %


(42.0-52.0)  L


 


Mean Corpuscular Volume 96 FL (80-99)  


 


Mean Corpuscular Hemoglobin


  33.3 PG


(27.0-31.0)  H


 


Mean Corpuscular Hemoglobin


Concent 34.7 G/DL


(32.0-36.0)


 


Red Cell Distribution Width


  12.4 %


(11.6-14.8)


 


Platelet Count


  118 K/UL


(150-450)  L


 


Mean Platelet Volume


  6.5 FL


(6.5-10.1)


 


Neutrophils (%) (Auto)


  73.6 %


(45.0-75.0)


 


Lymphocytes (%) (Auto)


  15.7 %


(20.0-45.0)  L


 


Monocytes (%) (Auto)


  9.4 %


(1.0-10.0)


 


Eosinophils (%) (Auto)


  0.7 %


(0.0-3.0)


 


Basophils (%) (Auto)


  0.7 %


(0.0-2.0)


 


Sodium Level


  140 MMOL/L


(136-145)


 


Potassium Level


  4.1 MMOL/L


(3.5-5.1)


 


Chloride Level


  106 MMOL/L


()


 


Carbon Dioxide Level


  31 MMOL/L


(21-32)


 


Anion Gap


  3 mmol/L


(5-15)  L


 


Blood Urea Nitrogen


  22 mg/dL


(7-18)  H


 


Creatinine


  1.0 MG/DL


(0.55-1.30)


 


Estimat Glomerular Filtration


Rate  mL/min (>60)  


 


 


Glucose Level


  326 MG/DL


()  H


 


Uric Acid


  2.1 MG/DL


(2.6-7.2)  L


 


Calcium Level


  8.9 MG/DL


(8.5-10.1)


 


Phosphorus Level


  1.7 MG/DL


(2.5-4.9)  L


 


Magnesium Level


  2.0 MG/DL


(1.8-2.4)


 


Total Bilirubin


  0.6 MG/DL


(0.2-1.0)


 


Aspartate Amino Transf


(AST/SGOT) 60 U/L (15-37)


H


 


Alanine Aminotransferase


(ALT/SGPT) 55 U/L (12-78)


 


 


Alkaline Phosphatase


  90 U/L


()


 


C-Reactive Protein,


Quantitative 22.8 mg/dL


(0.00-0.90)  H


 


Pro-B-Type Natriuretic Peptide


  548 pg/mL


(0-125)  H


 


Total Protein


  6.0 G/DL


(6.4-8.2)  L


 


Albumin


  1.8 G/DL


(3.4-5.0)  L


 


Globulin 4.2 g/dL  


 


Albumin/Globulin Ratio


  0.4 (1.0-2.7)


L











Current Medications








 Medications


  (Trade)  Dose


 Ordered  Sig/Shasta


 Route


 PRN Reason  Start Time


 Stop Time Status Last Admin


Dose Admin


 


 Acetaminophen


  (Tylenol)  650 mg  EVERY 6 HOURS  PRN


 GT


 Mild Pain/Temp > 100.5  2/28/18 01:15


 3/30/18 01:14  3/2/18 20:33


 


 


 Albuterol/


 Ipratropium


  (Albuterol/


 Ipratropium)  3 ml  Q4H  PRN


 HHN


 Shortness of Breath  3/3/18 12:15


 3/8/18 12:14   


 


 


 Albuterol/


 Ipratropium


  (Albuterol/


 Ipratropium)  3 ml  TIDRT


 HHN


   3/3/18 13:00


 3/8/18 12:59  3/4/18 11:12


 


 


 Carvedilol


  (Coreg)  12.5 mg  EVERY 12  HOURS


 GT


   3/2/18 21:00


 4/1/18 20:59  3/4/18 08:57


 


 


 Dextrose


  (Dextrose 50%)    STAT  PRN


 IV


 Hypoglycemia  3/2/18 12:00


 4/1/18 11:59   


 


 


 Dextrose


  (Dextrose 50%)    STAT  PRN


 IV


 Hypoglycemia  3/4/18 13:30


 4/3/18 13:29   


 


 


 Famotidine


  (Pepcid)  20 mg  BID


 GT


   2/28/18 12:00


 3/30/18 11:59  3/4/18 08:56


 


 


 Furosemide


  (Lasix)  40 mg  DAILY


 IV


   3/4/18 09:00


 4/3/18 08:59  3/4/18 08:58


 


 


 Heparin Sodium


  (Porcine)


  (Heparin 5000


 units/ml)  5,000 units  EVERY 12  HOURS


 SUBQ


   2/27/18 21:00


 3/29/18 20:59   


 


 


 Insulin Aspart


  (NovoLOG)    Q6HR


 SUBQ


   3/4/18 18:00


 4/3/18 17:59   


 


 


 Metoclopramide HCl


  (Reglan)  5 mg  EVERY 6  HOURS


 GT


   2/27/18 18:00


 3/29/18 17:59  3/4/18 11:48


 


 


 Piperacillin Sod/


 Tazobactam Sod


 3.375 gm/Sodium


 Chloride  110 ml @ 


 27.5 mls/hr  Q8HR


 IVPB


   2/27/18 15:00


 3/6/18 14:59  3/4/18 13:04


 


 


 Potassium


 Phosphate 30 mm/


 Sodium Chloride  285 ml @ 


 47.5 mls/hr  ONCE  ONCE


 IV


   3/4/18 10:30


 3/4/18 16:29  3/4/18 11:11


 


 


 Promethazine HCl/


 Codeine


  (Phenergan with


 Codeine)  5 ml  Q4H  PRN


 ORAL


 For Cough  3/1/18 09:00


 3/31/18 08:59   


 


 


 Sennosides


  (Senokot)  2 tab  BEDTIME


 GT


   2/27/18 21:00


 3/29/18 20:59  3/1/18 20:23


 


 


 Sitagliptin


 Phosphate


  (Januvia)  100 mg  DAILY


 GT


   3/2/18 09:00


 4/1/18 08:59  3/4/18 08:56


 


 


 Theophylline


  (Theophylline)  80 mg  EVERY 12  HOURS


 GT


   3/2/18 21:00


 4/1/18 20:59  3/4/18 08:57


 


 


 Vancomycin HCl


  (Vanco rx to


 dose)  1 ea  DAILY  PRN


 MISC


 Per rx protocol  2/27/18 14:00


 3/29/18 13:59   


 


 


 Vancomycin HCl/


 Dextrose  250 ml @ 


 125 mls/hr  Q24H


 IVPB


   3/3/18 10:00


 3/8/18 09:59  3/4/18 10:31


 

















Amira Gross M.D. Mar 4, 2018 14:53

## 2018-03-05 VITALS — DIASTOLIC BLOOD PRESSURE: 67 MMHG | SYSTOLIC BLOOD PRESSURE: 117 MMHG

## 2018-03-05 VITALS — DIASTOLIC BLOOD PRESSURE: 66 MMHG | SYSTOLIC BLOOD PRESSURE: 118 MMHG

## 2018-03-05 VITALS — SYSTOLIC BLOOD PRESSURE: 115 MMHG | DIASTOLIC BLOOD PRESSURE: 63 MMHG

## 2018-03-05 VITALS — DIASTOLIC BLOOD PRESSURE: 65 MMHG | SYSTOLIC BLOOD PRESSURE: 112 MMHG

## 2018-03-05 VITALS — DIASTOLIC BLOOD PRESSURE: 69 MMHG | SYSTOLIC BLOOD PRESSURE: 121 MMHG

## 2018-03-05 LAB
ADD MANUAL DIFF: NO
ALBUMIN SERPL-MCNC: 1.7 G/DL (ref 3.4–5)
ALBUMIN/GLOB SERPL: 0.4 {RATIO} (ref 1–2.7)
ALP SERPL-CCNC: 84 U/L (ref 46–116)
ALT SERPL-CCNC: 65 U/L (ref 12–78)
ANION GAP SERPL CALC-SCNC: 3 MMOL/L (ref 5–15)
AST SERPL-CCNC: 48 U/L (ref 15–37)
BASOPHILS NFR BLD AUTO: 0.5 % (ref 0–2)
BILIRUB SERPL-MCNC: 0.5 MG/DL (ref 0.2–1)
BUN SERPL-MCNC: 25 MG/DL (ref 7–18)
CALCIUM SERPL-MCNC: 9 MG/DL (ref 8.5–10.1)
CHLORIDE SERPL-SCNC: 108 MMOL/L (ref 98–107)
CO2 SERPL-SCNC: 34 MMOL/L (ref 21–32)
CREAT SERPL-MCNC: 1.1 MG/DL (ref 0.55–1.3)
EOSINOPHIL NFR BLD AUTO: 2.5 % (ref 0–3)
ERYTHROCYTE [DISTWIDTH] IN BLOOD BY AUTOMATED COUNT: 12.7 % (ref 11.6–14.8)
GLOBULIN SER-MCNC: 4.6 G/DL
HCT VFR BLD CALC: 36.5 % (ref 42–52)
HGB BLD-MCNC: 12.4 G/DL (ref 14.2–18)
LYMPHOCYTES NFR BLD AUTO: 17 % (ref 20–45)
MCV RBC AUTO: 96 FL (ref 80–99)
MONOCYTES NFR BLD AUTO: 9.2 % (ref 1–10)
NEUTROPHILS NFR BLD AUTO: 70.7 % (ref 45–75)
PHOSPHATE SERPL-MCNC: 2.8 MG/DL (ref 2.5–4.9)
PLATELET # BLD: 129 K/UL (ref 150–450)
POTASSIUM SERPL-SCNC: 4.1 MMOL/L (ref 3.5–5.1)
RBC # BLD AUTO: 3.8 M/UL (ref 4.7–6.1)
SODIUM SERPL-SCNC: 145 MMOL/L (ref 136–145)
WBC # BLD AUTO: 4.7 K/UL (ref 4.8–10.8)

## 2018-03-05 RX ADMIN — IPRATROPIUM BROMIDE AND ALBUTEROL SULFATE SCH ML: .5; 3 SOLUTION RESPIRATORY (INHALATION) at 19:08

## 2018-03-05 RX ADMIN — IPRATROPIUM BROMIDE AND ALBUTEROL SULFATE SCH ML: .5; 3 SOLUTION RESPIRATORY (INHALATION) at 07:17

## 2018-03-05 RX ADMIN — METOCLOPRAMIDE HYDROCHLORIDE SCH MG: 5 SOLUTION ORAL at 17:39

## 2018-03-05 RX ADMIN — INSULIN ASPART SCH UNITS: 100 INJECTION, SOLUTION INTRAVENOUS; SUBCUTANEOUS at 06:14

## 2018-03-05 RX ADMIN — INSULIN ASPART SCH UNITS: 100 INJECTION, SOLUTION INTRAVENOUS; SUBCUTANEOUS at 12:41

## 2018-03-05 RX ADMIN — Medication SCH MLS/HR: at 10:50

## 2018-03-05 RX ADMIN — METOCLOPRAMIDE HYDROCHLORIDE SCH MG: 5 SOLUTION ORAL at 06:11

## 2018-03-05 RX ADMIN — CARVEDILOL SCH MG: 6.25 TABLET, FILM COATED ORAL at 10:35

## 2018-03-05 RX ADMIN — SITAGLIPTIN SCH MG: 50 TABLET, FILM COATED ORAL at 10:35

## 2018-03-05 RX ADMIN — IPRATROPIUM BROMIDE AND ALBUTEROL SULFATE SCH ML: .5; 3 SOLUTION RESPIRATORY (INHALATION) at 12:36

## 2018-03-05 RX ADMIN — INSULIN ASPART SCH UNITS: 100 INJECTION, SOLUTION INTRAVENOUS; SUBCUTANEOUS at 00:12

## 2018-03-05 RX ADMIN — INSULIN ASPART SCH UNITS: 100 INJECTION, SOLUTION INTRAVENOUS; SUBCUTANEOUS at 17:41

## 2018-03-05 RX ADMIN — METOCLOPRAMIDE HYDROCHLORIDE SCH MG: 5 SOLUTION ORAL at 00:09

## 2018-03-05 RX ADMIN — DEXTROSE MONOHYDRATE SCH MLS/HR: 50 INJECTION, SOLUTION INTRAVENOUS at 06:11

## 2018-03-05 RX ADMIN — DEXTROSE MONOHYDRATE SCH MLS/HR: 50 INJECTION, SOLUTION INTRAVENOUS at 13:25

## 2018-03-05 RX ADMIN — HEPARIN SODIUM SCH UNITS: 5000 INJECTION INTRAVENOUS; SUBCUTANEOUS at 10:34

## 2018-03-05 RX ADMIN — METOCLOPRAMIDE HYDROCHLORIDE SCH MG: 5 SOLUTION ORAL at 12:39

## 2018-03-05 NOTE — DIAGNOSTIC IMAGING REPORT
Indication: Shortness of breath

 

Technique: One view of the chest

 

Comparison: 3/2/2018

 

Findings: Interim improvement of previously demonstrated perihilar interstitial and

airspace edema, with some residual interstitial edema persisting at pleural spaces

remain clear. Heart size is normal

 

Impression: Improved interstitial airspace edema, with some residual interstitial

edema

## 2018-03-05 NOTE — GENERAL PROGRESS NOTE
Assessment/Plan


Problem List:  


(1) Pneumonia


ICD Codes:  J18.9 - Pneumonia, unspecified organism


SNOMED:  593088946


(2) Functional quadriplegia


ICD Codes:  R53.2 - Functional quadriplegia


SNOMED:  058223785191241


(3) Diabetes mellitus


ICD Codes:  E11.9 - Type 2 diabetes mellitus without complications


SNOMED:  06300296


(4) UTI (urinary tract infection)


ICD Codes:  N39.0 - Urinary tract infection, site not specified


SNOMED:  26630102


(5) Respiratory distress


ICD Codes:  R06.03 - Acute respiratory distress


SNOMED:  225295223


Status Narrative





patient doing better


Assessment/Plan





discussed with MERCEDES Guzman who agreed to ECF discharge on meds via GT 

and breathing treatment


Lasix


chest PT


K Phos supplement


coreg 


antibiotics-


breathing treatment


 feeding Gt


Januvia


sliding scale insulin








2D echo


Left ventricular ejection fraction estimated to be 60-65%.


No evidence of  left ventricular hypertrophy.


poor prognosis





Subjective


ROS Limited/Unobtainable:  No


Constitutional:  Reports: malaise


Allergies:  


Coded Allergies:  


     No Known Allergies (Unverified , 3/23/16)





Objective





Last 24 Hour Vital Signs








  Date Time  Temp Pulse Resp B/P (MAP) Pulse Ox O2 Delivery O2 Flow Rate FiO2


 


3/5/18 10:35  96  117/67    


 


3/5/18 10:23  96 20   Nasal Cannula 4.0 36


 


3/5/18 08:00 98.8 68 18 117/67 99 Nasal Cannula 4.0 





 98.8       


 


3/5/18 08:00  97      


 


3/5/18 07:23  99 20  99 Nasal Cannula 4.0 36


 


3/5/18 07:23      Nasal Cannula 4.0 36


 


3/5/18 07:10  96 20  95 Nasal Cannula 4.0 36


 


3/5/18 07:09     95 Nasal Cannula 4.0 36


 


3/5/18 04:00  91      


 


3/5/18 04:00 97.9 90 20 112/65 96 Nasal Cannula 4.0 





 97.9       


 


3/5/18 00:00  88      


 


3/5/18 00:00 99.1 90 20 115/63 95 Nasal Cannula 4.0 





 99.1       


 


3/4/18 21:17  95  120/63    


 


3/4/18 20:27  96 20  92 Nasal Cannula 4.0 36


 


3/4/18 20:23      Nasal Cannula 3.0 32


 


3/4/18 20:23     96 Nasal Cannula 3.0 32


 


3/4/18 20:20  93 20  96 Venturi Mask 6.0 35


 


3/4/18 20:00  92      


 


3/4/18 20:00 98.2 95 20 116/63 95 Nasal Cannula 4.0 





 98.2       


 


3/4/18 16:00 99.4 95 20 118/63 95 Nasal Cannula 5.0 





 99.4       


 


3/4/18 16:00  92      


 


3/4/18 13:11      Nasal Cannula 5.0 40


 


3/4/18 13:10      Nasal Cannula 5.0 40


 


3/4/18 12:59  97      


 


3/4/18 12:00 99.7 95 23 106/55 95 Nasal Cannula  





 99.7       

















Intake and Output  


 


 3/4/18 3/5/18





 19:00 07:00


 


Intake Total 1260.0 ml 760.0 ml


 


Output Total 2100 ml 500 ml


 


Balance -840.0 ml 260.0 ml


 


  


 


IV Total 645.0 ml 110.0 ml


 


Tube Feeding 550 ml 650 ml


 


Other 65 ml 


 


Output Urine Total 2100 ml 500 ml


 


# Bowel Movements 2 1








Laboratory Tests


3/5/18 06:30: 


White Blood Count 4.7L, Red Blood Count 3.80L, Hemoglobin 12.4L, Hematocrit 

36.5L, Mean Corpuscular Volume 96, Mean Corpuscular Hemoglobin 32.7H, Mean 

Corpuscular Hemoglobin Concent 34.1, Red Cell Distribution Width 12.7, Platelet 

Count 129L, Mean Platelet Volume 6.5, Neutrophils (%) (Auto) 70.7, Lymphocytes (

%) (Auto) 17.0L, Monocytes (%) (Auto) 9.2, Eosinophils (%) (Auto) 2.5, 

Basophils (%) (Auto) 0.5, Sodium Level 145, Potassium Level 4.1, Chloride Level 

108H, Carbon Dioxide Level 34H, Anion Gap 3L, Blood Urea Nitrogen 25H, 

Creatinine 1.1, Estimat Glomerular Filtration Rate , Glucose Level 217#H, Uric 

Acid 2.5L, Calcium Level 9.0, Phosphorus Level 2.8, Magnesium Level 2.0, Total 

Bilirubin 0.5, Aspartate Amino Transf (AST/SGOT) 48H, Alanine Aminotransferase (

ALT/SGPT) 65, Alkaline Phosphatase 84, C-Reactive Protein, Quantitative 15.0H, 

Total Protein 6.3L, Albumin 1.7L, Globulin 4.6, Albumin/Globulin Ratio 0.4L


3/5/18 09:15: Vancomycin Level Trough 9.9


Height (Feet):  5


Height (Inches):  7.00


Weight (Pounds):  171


General Appearance:  no apparent distress


Cardiovascular:  tachycardia


Respiratory/Chest:  decreased breath sounds


Abdomen:  soft, other - PEG


Pelvis:  other


Objective


no other changes











NAY CALDWELL Mar 5, 2018 11:45

## 2018-03-05 NOTE — PULMONOLOGY PROGRESS NOTE
Assessment/Plan


Problems:  


(1) Respiratory distress


(2) Pneumonia


(3) HCAP (healthcare-associated pneumonia)


(4) Sepsis


(5) Diabetes mellitus


(6) Feeding by G-tube


(7) Functional quadriplegia


(8) Dementia


Assessment/Plan


chest pt


iv abx


respiratory treatment


check sputum, still pending


titrate fio2


chest pt


anttussives.


repeat CXR  in am 





labs for am


continue current care.





dc planning





Subjective


ROS Limited/Unobtainable:  No


Constitutional:  Reports: no symptoms


Respiratory:  Reports: no symptoms


Allergies:  


Coded Allergies:  


     No Known Allergies (Unverified , 3/23/16)





Objective





Last 24 Hour Vital Signs








  Date Time  Temp Pulse Resp B/P (MAP) Pulse Ox O2 Delivery O2 Flow Rate FiO2


 


3/5/18 10:35  96  117/67    


 


3/5/18 10:23  96 20   Nasal Cannula 4.0 36


 


3/5/18 08:00 98.8 68 18 117/67 99 Nasal Cannula 4.0 





 98.8       


 


3/5/18 08:00  97      


 


3/5/18 07:23  99 20  99 Nasal Cannula 4.0 36


 


3/5/18 07:23      Nasal Cannula 4.0 36


 


3/5/18 07:10  96 20  95 Nasal Cannula 4.0 36


 


3/5/18 07:09     95 Nasal Cannula 4.0 36


 


3/5/18 04:00  91      


 


3/5/18 04:00 97.9 90 20 112/65 96 Nasal Cannula 4.0 





 97.9       


 


3/5/18 00:00  88      


 


3/5/18 00:00 99.1 90 20 115/63 95 Nasal Cannula 4.0 





 99.1       


 


3/4/18 21:17  95  120/63    


 


3/4/18 20:27  96 20  92 Nasal Cannula 4.0 36


 


3/4/18 20:23      Nasal Cannula 3.0 32


 


3/4/18 20:23     96 Nasal Cannula 3.0 32


 


3/4/18 20:20  93 20  96 Venturi Mask 6.0 35


 


3/4/18 20:00  92      


 


3/4/18 20:00 98.2 95 20 116/63 95 Nasal Cannula 4.0 





 98.2       


 


3/4/18 16:00 99.4 95 20 118/63 95 Nasal Cannula 5.0 





 99.4       


 


3/4/18 16:00  92      


 


3/4/18 13:11      Nasal Cannula 5.0 40


 


3/4/18 13:10      Nasal Cannula 5.0 40


 


3/4/18 12:59  97      

















Intake and Output  


 


 3/4/18 3/5/18





 19:00 07:00


 


Intake Total 1260.0 ml 760.0 ml


 


Output Total 2100 ml 500 ml


 


Balance -840.0 ml 260.0 ml


 


  


 


IV Total 645.0 ml 110.0 ml


 


Tube Feeding 550 ml 650 ml


 


Other 65 ml 


 


Output Urine Total 2100 ml 500 ml


 


# Bowel Movements 2 1








General Appearance:  WD/WN


Respiratory/Chest:  chest wall non-tender


Cardiovascular:  normal peripheral pulses, normal rate, no JVD


Abdomen:  normal bowel sounds, soft, non tender


Extremities:  no cyanosis, no clubbing


Skin:  no lesions


Neurologic/Psychiatric:  no motor/sensory deficits


Lymphatic:  no neck adenopathy





Microbiology








 Date/Time


Source Procedure


Growth Status


 


 


 3/2/18 16:18


Sputum Gram Stain - Final Resulted


 


 3/2/18 16:18


Sputum Sputum Culture


Pending Resulted


 


 3/2/18 16:18


Indwelling Cath Urine Culture - Final


NO GROWTH AFTER 48 HOURS Complete








Laboratory Tests


3/5/18 06:30: 


White Blood Count 4.7L, Red Blood Count 3.80L, Hemoglobin 12.4L, Hematocrit 

36.5L, Mean Corpuscular Volume 96, Mean Corpuscular Hemoglobin 32.7H, Mean 

Corpuscular Hemoglobin Concent 34.1, Red Cell Distribution Width 12.7, Platelet 

Count 129L, Mean Platelet Volume 6.5, Neutrophils (%) (Auto) 70.7, Lymphocytes (

%) (Auto) 17.0L, Monocytes (%) (Auto) 9.2, Eosinophils (%) (Auto) 2.5, 

Basophils (%) (Auto) 0.5, Sodium Level 145, Potassium Level 4.1, Chloride Level 

108H, Carbon Dioxide Level 34H, Anion Gap 3L, Blood Urea Nitrogen 25H, 

Creatinine 1.1, Estimat Glomerular Filtration Rate , Glucose Level 217#H, Uric 

Acid 2.5L, Calcium Level 9.0, Phosphorus Level 2.8, Magnesium Level 2.0, Total 

Bilirubin 0.5, Aspartate Amino Transf (AST/SGOT) 48H, Alanine Aminotransferase (

ALT/SGPT) 65, Alkaline Phosphatase 84, C-Reactive Protein, Quantitative 15.0H, 

Total Protein 6.3L, Albumin 1.7L, Globulin 4.6, Albumin/Globulin Ratio 0.4L


3/5/18 09:15: Vancomycin Level Trough 9.9





Current Medications








 Medications


  (Trade)  Dose


 Ordered  Sig/Shasta


 Route


 PRN Reason  Start Time


 Stop Time Status Last Admin


Dose Admin


 


 Acetaminophen


  (Tylenol)  650 mg  EVERY 6 HOURS  PRN


 GT


 Mild Pain/Temp > 100.5  2/28/18 01:15


 3/30/18 01:14  3/2/18 20:33


 


 


 Albuterol/


 Ipratropium


  (Albuterol/


 Ipratropium)  3 ml  Q4H  PRN


 HHN


 Shortness of Breath  3/3/18 12:15


 3/8/18 12:14   


 


 


 Albuterol/


 Ipratropium


  (Albuterol/


 Ipratropium)  3 ml  TIDRT


 HHN


   3/3/18 13:00


 3/8/18 12:59  3/5/18 07:17


 


 


 Amoxicillin/


 Clavulanate


 Potassium


  (Augmentin


 Suspension)  500 mg  Q8HR


 NG


   3/5/18 14:00


 3/12/18 13:59 UNV  


 


 


 Carvedilol


  (Coreg)  12.5 mg  EVERY 12  HOURS


 GT


   3/2/18 21:00


 4/1/18 20:59  3/5/18 10:35


 


 


 Dextrose


  (Dextrose 50%)    STAT  PRN


 IV


 Hypoglycemia  3/2/18 12:00


 4/1/18 11:59   


 


 


 Dextrose


  (Dextrose 50%)    STAT  PRN


 IV


 Hypoglycemia  3/4/18 13:30


 4/3/18 13:29   


 


 


 Famotidine


  (Pepcid)  20 mg  BID


 GT


   2/28/18 12:00


 3/30/18 11:59  3/5/18 10:35


 


 


 Furosemide


  (Lasix)  40 mg  DAILY


 GT


   3/6/18 09:00


 4/5/18 08:59   


 


 


 Heparin Sodium


  (Porcine)


  (Heparin 5000


 units/ml)  5,000 units  EVERY 12  HOURS


 SUBQ


   2/27/18 21:00


 3/29/18 20:59  3/5/18 10:34


 


 


 Insulin Aspart


  (NovoLOG)    Q6HR


 SUBQ


   3/4/18 18:00


 4/3/18 17:59  3/5/18 06:14


 


 


 Metoclopramide HCl


  (Reglan)  5 mg  EVERY 6  HOURS


 GT


   2/27/18 18:00


 3/29/18 17:59  3/5/18 06:11


 


 


 Piperacillin Sod/


 Tazobactam Sod


 3.375 gm/Sodium


 Chloride  110 ml @ 


 27.5 mls/hr  Q8HR


 IVPB


   2/27/18 15:00


 3/10/18 14:59  3/5/18 06:11


 


 


 Promethazine HCl/


 Codeine


  (Phenergan with


 Codeine)  5 ml  Q4H  PRN


 ORAL


 For Cough  3/1/18 09:00


 3/31/18 08:59   


 


 


 Sennosides


  (Senokot)  2 tab  BEDTIME


 GT


   2/27/18 21:00


 3/29/18 20:59  3/4/18 21:18


 


 


 Sitagliptin


 Phosphate


  (Januvia)  100 mg  DAILY


 GT


   3/2/18 09:00


 4/1/18 08:59  3/5/18 10:35


 


 


 Theophylline


  (Theophylline)  80 mg  EVERY 12  HOURS


 GT


   3/2/18 21:00


 4/1/18 20:59  3/5/18 10:36


 

















JOAN DEAMRCO Mar 5, 2018 12:25

## 2018-03-05 NOTE — INFECTIOUS DISEASES PROG NOTE
Assessment/Plan


Problems:  


(1) HCAP (healthcare-associated pneumonia)


Assessment & Plan:  with right side lung infiltrates, suspect aspiration, 

continue  vancomycin and zosyn empiric coverage for now . sputum culture didn't 

grow any bacteria , may switch to Augmentin and doxycycline orally for another 

5 days .





(2) UTI (urinary tract infection)


Assessment & Plan:  with negative urine culture , already on  zosyn 





(3) Sepsis


Assessment & Plan:  ruled out with negative blood culture , already on on 

vancomycin with zosyn empirically to cover pneumonia. 





(4) Fever


Assessment & Plan:  improving , due to the above, continue wide spectrum 

antibiotics, and tylenol as needed 





(5) Acute respiratory failure


Assessment & Plan:  due to the above, continue High flow oxygen via mask, and 

inhalers, monitor CXR 





(6) Diabetes mellitus


Assessment & Plan:  recommend tight glycemic control to keep blood glucose 

between 100-140 








Subjective


ROS Limited/Unobtainable:  Yes


Allergies:  


Coded Allergies:  


     No Known Allergies (Unverified , 3/23/16)


Subjective


he was comfortable, lying in bed , comfortable, had no more fever , no SOB, on 

oxygen via nasal canula





Objective


Vital Signs





Last 24 Hour Vital Signs








  Date Time  Temp Pulse Resp B/P (MAP) Pulse Ox O2 Delivery O2 Flow Rate FiO2


 


3/5/18 12:49  97 20  99 Nasal Cannula 4.0 36


 


3/5/18 12:30  93 20  97 Nasal Cannula 4.0 36


 


3/5/18 12:00 97.7 72 19 121/69 98 Nasal Cannula 4.0 





 97.7       


 


3/5/18 10:35  96  117/67    


 


3/5/18 10:23  96 20   Nasal Cannula 4.0 36


 


3/5/18 08:00 98.8 68 18 117/67 99 Nasal Cannula 4.0 





 98.8       


 


3/5/18 08:00  97      


 


3/5/18 07:23  99 20  99 Nasal Cannula 4.0 36


 


3/5/18 07:23      Nasal Cannula 4.0 36


 


3/5/18 07:10  96 20  95 Nasal Cannula 4.0 36


 


3/5/18 07:09     95 Nasal Cannula 4.0 36


 


3/5/18 04:00  91      


 


3/5/18 04:00 97.9 90 20 112/65 96 Nasal Cannula 4.0 





 97.9       


 


3/5/18 00:00  88      


 


3/5/18 00:00 99.1 90 20 115/63 95 Nasal Cannula 4.0 





 99.1       


 


3/4/18 21:17  95  120/63    


 


3/4/18 20:27  96 20  92 Nasal Cannula 4.0 36


 


3/4/18 20:23      Nasal Cannula 3.0 32


 


3/4/18 20:23     96 Nasal Cannula 3.0 32


 


3/4/18 20:20  93 20  96 Venturi Mask 6.0 35


 


3/4/18 20:00  92      


 


3/4/18 20:00 98.2 95 20 116/63 95 Nasal Cannula 4.0 





 98.2       


 


3/4/18 16:00 99.4 95 20 118/63 95 Nasal Cannula 5.0 





 99.4       


 


3/4/18 16:00  92      








Height (Feet):  5


Height (Inches):  7.00


Weight (Pounds):  171


General Appearance:  WD/WN, no acute distress


HEENT:  normocephalic, atraumatic, anicteric, mucous membranes moist, PERRL


Respiratory/Chest:  chest wall non-tender, lungs clear, normal breath sounds, 

no respiratory distress, no accessory muscle use, decreased breath sounds, 

expiratory wheezing


Cardiovascular:  normal peripheral pulses, normal rate, regular rhythm, no 

gallop/murmur, no JVD


Abdomen:  normal bowel sounds, soft, non tender, no organomegaly, non distended

, no mass, no scars


Extremities:  no cyanosis, no clubbing


Skin:  no rash, no lesions, ulcers


Neurologic/Psychiatric:  alert, responsive


Lymphatic:  no neck adenopathy, no groin adenopathy


Musculoskeletal:  normal muscle bulk





Microbiology








 Date/Time


Source Procedure


Growth Status


 


 


 3/2/18 16:18


Sputum Gram Stain - Final Resulted


 


 3/2/18 16:18


Sputum Sputum Culture


Pending Resulted


 


 3/2/18 16:18


Indwelling Cath Urine Culture - Final


NO GROWTH AFTER 48 HOURS Complete











Laboratory Tests








Test


  3/5/18


06:30 3/5/18


09:15


 


White Blood Count


  4.7 K/UL


(4.8-10.8)  L 


 


 


Red Blood Count


  3.80 M/UL


(4.70-6.10)  L 


 


 


Hemoglobin


  12.4 G/DL


(14.2-18.0)  L 


 


 


Hematocrit


  36.5 %


(42.0-52.0)  L 


 


 


Mean Corpuscular Volume 96 FL (80-99)   


 


Mean Corpuscular Hemoglobin


  32.7 PG


(27.0-31.0)  H 


 


 


Mean Corpuscular Hemoglobin


Concent 34.1 G/DL


(32.0-36.0) 


 


 


Red Cell Distribution Width


  12.7 %


(11.6-14.8) 


 


 


Platelet Count


  129 K/UL


(150-450)  L 


 


 


Mean Platelet Volume


  6.5 FL


(6.5-10.1) 


 


 


Neutrophils (%) (Auto)


  70.7 %


(45.0-75.0) 


 


 


Lymphocytes (%) (Auto)


  17.0 %


(20.0-45.0)  L 


 


 


Monocytes (%) (Auto)


  9.2 %


(1.0-10.0) 


 


 


Eosinophils (%) (Auto)


  2.5 %


(0.0-3.0) 


 


 


Basophils (%) (Auto)


  0.5 %


(0.0-2.0) 


 


 


Sodium Level


  145 MMOL/L


(136-145) 


 


 


Potassium Level


  4.1 MMOL/L


(3.5-5.1) 


 


 


Chloride Level


  108 MMOL/L


()  H 


 


 


Carbon Dioxide Level


  34 MMOL/L


(21-32)  H 


 


 


Anion Gap


  3 mmol/L


(5-15)  L 


 


 


Blood Urea Nitrogen


  25 mg/dL


(7-18)  H 


 


 


Creatinine


  1.1 MG/DL


(0.55-1.30) 


 


 


Estimat Glomerular Filtration


Rate  mL/min (>60)  


  


 


 


Glucose Level


  217 MG/DL


()  #H 


 


 


Uric Acid


  2.5 MG/DL


(2.6-7.2)  L 


 


 


Calcium Level


  9.0 MG/DL


(8.5-10.1) 


 


 


Phosphorus Level


  2.8 MG/DL


(2.5-4.9) 


 


 


Magnesium Level


  2.0 MG/DL


(1.8-2.4) 


 


 


Total Bilirubin


  0.5 MG/DL


(0.2-1.0) 


 


 


Aspartate Amino Transf


(AST/SGOT) 48 U/L (15-37)


H 


 


 


Alanine Aminotransferase


(ALT/SGPT) 65 U/L (12-78)


  


 


 


Alkaline Phosphatase


  84 U/L


() 


 


 


C-Reactive Protein,


Quantitative 15.0 mg/dL


(0.00-0.90)  H 


 


 


Total Protein


  6.3 G/DL


(6.4-8.2)  L 


 


 


Albumin


  1.7 G/DL


(3.4-5.0)  L 


 


 


Globulin 4.6 g/dL   


 


Albumin/Globulin Ratio


  0.4 (1.0-2.7)


L 


 


 


Vancomycin Level Trough


  


  9.9 ug/mL


(5.0-12.0)











Current Medications








 Medications


  (Trade)  Dose


 Ordered  Sig/Shasta


 Route


 PRN Reason  Start Time


 Stop Time Status Last Admin


Dose Admin


 


 Acetaminophen


  (Tylenol)  650 mg  EVERY 6 HOURS  PRN


 GT


 Mild Pain/Temp > 100.5  2/28/18 01:15


 3/30/18 01:14  3/2/18 20:33


 


 


 Albuterol/


 Ipratropium


  (Albuterol/


 Ipratropium)  3 ml  Q4H  PRN


 HHN


 Shortness of Breath  3/3/18 12:15


 3/8/18 12:14   


 


 


 Albuterol/


 Ipratropium


  (Albuterol/


 Ipratropium)  3 ml  TIDRT


 HHN


   3/3/18 13:00


 3/8/18 12:59  3/5/18 12:36


 


 


 Amoxicillin/


 Clavulanate


 Potassium


  (Augmentin


 Suspension)  500 mg  Q8HR


 NG


   3/5/18 22:00


 3/12/18 21:59   


 


 


 Carvedilol


  (Coreg)  12.5 mg  EVERY 12  HOURS


 GT


   3/2/18 21:00


 4/1/18 20:59  3/5/18 10:35


 


 


 Dextrose


  (Dextrose 50%)    STAT  PRN


 IV


 Hypoglycemia  3/2/18 12:00


 4/1/18 11:59   


 


 


 Dextrose


  (Dextrose 50%)    STAT  PRN


 IV


 Hypoglycemia  3/4/18 13:30


 4/3/18 13:29   


 


 


 Famotidine


  (Pepcid)  20 mg  BID


 GT


   2/28/18 12:00


 3/30/18 11:59  3/5/18 10:35


 


 


 Furosemide


  (Lasix)  40 mg  DAILY


 GT


   3/6/18 09:00


 4/5/18 08:59   


 


 


 Heparin Sodium


  (Porcine)


  (Heparin 5000


 units/ml)  5,000 units  EVERY 12  HOURS


 SUBQ


   2/27/18 21:00


 3/29/18 20:59  3/5/18 10:34


 


 


 Insulin Aspart


  (NovoLOG)    Q6HR


 SUBQ


   3/4/18 18:00


 4/3/18 17:59  3/5/18 12:41


 


 


 Metoclopramide HCl


  (Reglan)  5 mg  EVERY 6  HOURS


 GT


   2/27/18 18:00


 3/29/18 17:59  3/5/18 12:39


 


 


 Piperacillin Sod/


 Tazobactam Sod


 3.375 gm/Sodium


 Chloride  110 ml @ 


 27.5 mls/hr  Q8HR


 IVPB


   2/27/18 15:00


 3/5/18 18:00  3/5/18 13:25


 


 


 Promethazine HCl/


 Codeine


  (Phenergan with


 Codeine)  5 ml  Q4H  PRN


 ORAL


 For Cough  3/1/18 09:00


 3/31/18 08:59   


 


 


 Sennosides


  (Senokot)  2 tab  BEDTIME


 GT


   2/27/18 21:00


 3/29/18 20:59  3/4/18 21:18


 


 


 Sitagliptin


 Phosphate


  (Januvia)  100 mg  DAILY


 GT


   3/2/18 09:00


 4/1/18 08:59  3/5/18 10:35


 


 


 Theophylline


  (Theophylline)  80 mg  EVERY 12  HOURS


 GT


   3/2/18 21:00


 4/1/18 20:59  3/5/18 10:36


 

















Amira Gross M.D. Mar 5, 2018 13:59

## 2018-03-05 NOTE — DISCHARGE INSTRUCTIONS
Discharge Instructions


Discharge Instructions


Follow up with:  fu in ECF with me


Diet:  other - GT feeding glucerna 50 cc hour


Special Instructions





aspiration percaution


breathing treatment


chest PT


DNR





For Congestive Heart Failure


Reminder


Report to your physician any weight gain of 5 pounds or more in one week.











NAY CALDWELL Mar 5, 2018 11:51

## 2018-03-06 NOTE — DIAGNOSTIC IMAGING REPORT
--------------- APPROVED REPORT --------------





CPT Code: 17231



Present Symptoms

Shortness of breath 





BILATERAL: Imaging reveals a patent deep venous system bilaterally. There is no evidence 

of thrombus within the femoral, popliteal or tibial segments. The greater saphenous veins 

are also within normal limits. Doppler indicates normal spontaneous flow within these 

segments.

## 2018-03-07 ENCOUNTER — HOSPITAL ENCOUNTER (INPATIENT)
Dept: HOSPITAL 72 - EMR | Age: 83
LOS: 6 days | Discharge: NURSING, REHAB OR LONG-TERM CARE | DRG: 871 | End: 2018-03-13
Payer: MEDICARE

## 2018-03-07 VITALS — DIASTOLIC BLOOD PRESSURE: 58 MMHG | SYSTOLIC BLOOD PRESSURE: 104 MMHG

## 2018-03-07 VITALS — SYSTOLIC BLOOD PRESSURE: 105 MMHG | DIASTOLIC BLOOD PRESSURE: 57 MMHG

## 2018-03-07 VITALS — DIASTOLIC BLOOD PRESSURE: 61 MMHG | SYSTOLIC BLOOD PRESSURE: 102 MMHG

## 2018-03-07 VITALS — SYSTOLIC BLOOD PRESSURE: 113 MMHG | DIASTOLIC BLOOD PRESSURE: 54 MMHG

## 2018-03-07 VITALS — DIASTOLIC BLOOD PRESSURE: 59 MMHG | SYSTOLIC BLOOD PRESSURE: 100 MMHG

## 2018-03-07 VITALS — BODY MASS INDEX: 21.6 KG/M2 | HEIGHT: 73 IN | WEIGHT: 163 LBS

## 2018-03-07 VITALS — DIASTOLIC BLOOD PRESSURE: 97 MMHG | SYSTOLIC BLOOD PRESSURE: 153 MMHG

## 2018-03-07 VITALS — SYSTOLIC BLOOD PRESSURE: 111 MMHG | DIASTOLIC BLOOD PRESSURE: 62 MMHG

## 2018-03-07 VITALS — SYSTOLIC BLOOD PRESSURE: 152 MMHG | DIASTOLIC BLOOD PRESSURE: 71 MMHG

## 2018-03-07 DIAGNOSIS — E86.0: ICD-10-CM

## 2018-03-07 DIAGNOSIS — R53.2: ICD-10-CM

## 2018-03-07 DIAGNOSIS — I48.91: ICD-10-CM

## 2018-03-07 DIAGNOSIS — E11.9: ICD-10-CM

## 2018-03-07 DIAGNOSIS — F03.90: ICD-10-CM

## 2018-03-07 DIAGNOSIS — Z86.73: ICD-10-CM

## 2018-03-07 DIAGNOSIS — I10: ICD-10-CM

## 2018-03-07 DIAGNOSIS — J96.01: ICD-10-CM

## 2018-03-07 DIAGNOSIS — Z93.1: ICD-10-CM

## 2018-03-07 DIAGNOSIS — A41.9: Primary | ICD-10-CM

## 2018-03-07 DIAGNOSIS — J18.9: ICD-10-CM

## 2018-03-07 DIAGNOSIS — R13.10: ICD-10-CM

## 2018-03-07 DIAGNOSIS — Z51.5: ICD-10-CM

## 2018-03-07 DIAGNOSIS — N39.0: ICD-10-CM

## 2018-03-07 DIAGNOSIS — Z66: ICD-10-CM

## 2018-03-07 LAB
ADD MANUAL DIFF: YES
ALBUMIN SERPL-MCNC: 1.9 G/DL (ref 3.4–5)
ALBUMIN/GLOB SERPL: 0.4 {RATIO} (ref 1–2.7)
ALP SERPL-CCNC: 83 U/L (ref 46–116)
ALT SERPL-CCNC: 38 U/L (ref 12–78)
ANION GAP SERPL CALC-SCNC: 6 MMOL/L (ref 5–15)
APPEARANCE UR: CLEAR
APTT PPP: (no result) S
AST SERPL-CCNC: 25 U/L (ref 15–37)
BILIRUB SERPL-MCNC: 0.6 MG/DL (ref 0.2–1)
BUN SERPL-MCNC: 40 MG/DL (ref 7–18)
CALCIUM SERPL-MCNC: 9.3 MG/DL (ref 8.5–10.1)
CHLORIDE SERPL-SCNC: 103 MMOL/L (ref 98–107)
CK MB SERPL-MCNC: < 0.5 NG/ML (ref 0–3.6)
CK SERPL-CCNC: 34 U/L (ref 26–308)
CO2 SERPL-SCNC: 32 MMOL/L (ref 21–32)
CREAT SERPL-MCNC: 1.6 MG/DL (ref 0.55–1.3)
ERYTHROCYTE [DISTWIDTH] IN BLOOD BY AUTOMATED COUNT: 12.5 % (ref 11.6–14.8)
GLOBULIN SER-MCNC: 5.4 G/DL
GLUCOSE UR STRIP-MCNC: (no result) MG/DL
HCT VFR BLD CALC: 39.9 % (ref 42–52)
HGB BLD-MCNC: 13.7 G/DL (ref 14.2–18)
KETONES UR QL STRIP: NEGATIVE
LEUKOCYTE ESTERASE UR QL STRIP: NEGATIVE
MCV RBC AUTO: 96 FL (ref 80–99)
NITRITE UR QL STRIP: NEGATIVE
PH UR STRIP: 7 [PH] (ref 4.5–8)
PLATELET # BLD: 175 K/UL (ref 150–450)
POTASSIUM SERPL-SCNC: 4.2 MMOL/L (ref 3.5–5.1)
PROT UR QL STRIP: (no result)
RBC # BLD AUTO: 4.15 M/UL (ref 4.7–6.1)
SODIUM SERPL-SCNC: 141 MMOL/L (ref 136–145)
SP GR UR STRIP: 1.01 (ref 1–1.03)
UROBILINOGEN UR-MCNC: NORMAL MG/DL (ref 0–1)
WBC # BLD AUTO: 10.4 K/UL (ref 4.8–10.8)

## 2018-03-07 PROCEDURE — 84484 ASSAY OF TROPONIN QUANT: CPT

## 2018-03-07 PROCEDURE — 94760 N-INVAS EAR/PLS OXIMETRY 1: CPT

## 2018-03-07 PROCEDURE — 99283 EMERGENCY DEPT VISIT LOW MDM: CPT

## 2018-03-07 PROCEDURE — 80202 ASSAY OF VANCOMYCIN: CPT

## 2018-03-07 PROCEDURE — 82977 ASSAY OF GGT: CPT

## 2018-03-07 PROCEDURE — 71045 X-RAY EXAM CHEST 1 VIEW: CPT

## 2018-03-07 PROCEDURE — 81001 URINALYSIS AUTO W/SCOPE: CPT

## 2018-03-07 PROCEDURE — 82553 CREATINE MB FRACTION: CPT

## 2018-03-07 PROCEDURE — 86710 INFLUENZA VIRUS ANTIBODY: CPT

## 2018-03-07 PROCEDURE — 86140 C-REACTIVE PROTEIN: CPT

## 2018-03-07 PROCEDURE — 84100 ASSAY OF PHOSPHORUS: CPT

## 2018-03-07 PROCEDURE — 83735 ASSAY OF MAGNESIUM: CPT

## 2018-03-07 PROCEDURE — 80053 COMPREHEN METABOLIC PANEL: CPT

## 2018-03-07 PROCEDURE — 84439 ASSAY OF FREE THYROXINE: CPT

## 2018-03-07 PROCEDURE — 83880 ASSAY OF NATRIURETIC PEPTIDE: CPT

## 2018-03-07 PROCEDURE — 83605 ASSAY OF LACTIC ACID: CPT

## 2018-03-07 PROCEDURE — 36415 COLL VENOUS BLD VENIPUNCTURE: CPT

## 2018-03-07 PROCEDURE — 85007 BL SMEAR W/DIFF WBC COUNT: CPT

## 2018-03-07 PROCEDURE — 82550 ASSAY OF CK (CPK): CPT

## 2018-03-07 PROCEDURE — 87181 SC STD AGAR DILUTION PER AGT: CPT

## 2018-03-07 PROCEDURE — 84550 ASSAY OF BLOOD/URIC ACID: CPT

## 2018-03-07 PROCEDURE — 81003 URINALYSIS AUTO W/O SCOPE: CPT

## 2018-03-07 PROCEDURE — 85025 COMPLETE CBC W/AUTO DIFF WBC: CPT

## 2018-03-07 PROCEDURE — 82962 GLUCOSE BLOOD TEST: CPT

## 2018-03-07 PROCEDURE — 93005 ELECTROCARDIOGRAM TRACING: CPT

## 2018-03-07 PROCEDURE — 87205 SMEAR GRAM STAIN: CPT

## 2018-03-07 PROCEDURE — 36600 WITHDRAWAL OF ARTERIAL BLOOD: CPT

## 2018-03-07 PROCEDURE — 80162 ASSAY OF DIGOXIN TOTAL: CPT

## 2018-03-07 PROCEDURE — 83036 HEMOGLOBIN GLYCOSYLATED A1C: CPT

## 2018-03-07 PROCEDURE — 87040 BLOOD CULTURE FOR BACTERIA: CPT

## 2018-03-07 PROCEDURE — 99285 EMERGENCY DEPT VISIT HI MDM: CPT

## 2018-03-07 PROCEDURE — 87070 CULTURE OTHR SPECIMN AEROBIC: CPT

## 2018-03-07 PROCEDURE — 94640 AIRWAY INHALATION TREATMENT: CPT

## 2018-03-07 PROCEDURE — 84443 ASSAY THYROID STIM HORMONE: CPT

## 2018-03-07 PROCEDURE — 82803 BLOOD GASES ANY COMBINATION: CPT

## 2018-03-07 RX ADMIN — ALBUTEROL SULFATE SCH MG: 2.5 SOLUTION RESPIRATORY (INHALATION) at 19:39

## 2018-03-07 RX ADMIN — METOCLOPRAMIDE HYDROCHLORIDE SCH MG: 5 SOLUTION ORAL at 17:18

## 2018-03-07 RX ADMIN — DEXTROSE MONOHYDRATE SCH MLS/HR: 50 INJECTION, SOLUTION INTRAVENOUS at 21:12

## 2018-03-07 RX ADMIN — METOCLOPRAMIDE HYDROCHLORIDE SCH MG: 5 SOLUTION ORAL at 13:23

## 2018-03-07 RX ADMIN — ALBUTEROL SULFATE SCH MG: 2.5 SOLUTION RESPIRATORY (INHALATION) at 13:43

## 2018-03-07 RX ADMIN — METOPROLOL TARTRATE SCH MG: 25 TABLET, FILM COATED ORAL at 21:09

## 2018-03-07 RX ADMIN — DEXTROSE MONOHYDRATE SCH MLS/HR: 50 INJECTION, SOLUTION INTRAVENOUS at 17:17

## 2018-03-07 RX ADMIN — DOCUSATE SODIUM SCH MG: 50 LIQUID ORAL at 17:17

## 2018-03-07 RX ADMIN — DOCUSATE SODIUM SCH MG: 50 LIQUID ORAL at 13:23

## 2018-03-07 NOTE — DIAGNOSTIC IMAGING REPORT
Indication: Dyspnea

 

Comparison:  3/5/2018

 

A single view chest radiograph was obtained.

 

Findings:

 

Patchy interstitial prominence noted bilaterally. Heart size is normal. Atelectasis

suspected at the right lung base. Lung volumes are low.

 

IMPRESSION:

 

Some increased atelectasis at the right lung base. No change otherwise. Patchy

interstitial edema versus pneumonitis

## 2018-03-07 NOTE — CONSULTATION
DATE OF CONSULTATION:  03/07/2018



INFECTIOUS DISEASES CONSULTATION



CONSULTING PHYSICIAN:  Amira Gross M.D.



REQUESTING PHYSICIAN:  Nish Schilling M.D.



REASON FOR CONSULTATION:  Healthcare-acquired pneumonia, acute respiratory

failure with sepsis.  Recommendation for antibiotics treatment.



HISTORY OF PRESENT ILLNESS:  The patient is an 88-year-old male with past

medical history of dementia, CVA, hypertension, and diabetes was sent to

[facility] Emergency Room from nursing home due to respiratory

distress and hypoxemia.  As per the nursing staff, the patient was having

more crackles and difficulty breathing.  He was found to be hypoxemic.

The patient was recently at [facility] for treatment of

aspiration pneumonia and he was not getting enough suctioning probably at

the nursing home facility.  The patient unclear whether he had another

episode of aspiration pneumonia over there so he was sent for further

evaluation.  Chest x-ray showed pneumonia and he was having fever

concerning for sepsis with temperature of 99.6, and saturating 87% on room

air.  The patient was placed on BiPAP, started on IV vancomycin and Zosyn.

An infectious diseases consultation was requested for further evaluation

and management.  As of note, the patient is poor historian, cannot provide

any history.  History was mainly obtained from the medical record and

nursing staff.



REVIEW OF SYSTEMS:  Unable to obtain.  The patient is is poor historian,

cannot provide any history.



PAST MEDICAL HISTORY:  Significant for diabetes, hypertension, CVA,

dementia.



PAST SURGICAL HISTORY:  Not on record.



ALLERGIES:  No known drug allergy.



MEDICATIONS:  He is on vancomycin and Zosyn.  For the rest of his

medications, please refer to MAR.



FAMILY HISTORY:  Unable to obtain.



SOCIAL HISTORY:  The patient lives at [facility].  No

recent drugs, tobacco, or alcohol.



PHYSICAL EXAMINATION:

VITAL SIGNS:  Temperature 98, pulse 112, respirations 23, blood pressure

105/57, pulse oximetry 96% on 15 liters nonrebreather mask.

GENERAL:  Elderly male, lying in bed, on Ventimask, currently awake and

responsive to verbal commands, not in acute distress.  Wheezing.

HEENT:  Normocephalic and atraumatic.  Pupils are reactive to light

equally.  Unable to assess oral mucosa due to unresponsiveness  .

NECK:  Supple.  No lymphadenopathy.

CARDIOVASCULAR:  He is tachycardic.  S1 and S2 normal.  No murmur or

gallop.

LUNGS:  He had crackles and wheezing at the bases with diminished breathing

sounds.  Normal respiratory effort.

ABDOMEN:  Soft, nontender, nondistended.  Positive bowel sounds.  No

hepatosplenomegaly.  No ascites.

EXTREMITIES:  He had multiple bruises and bleeding from both arms and less

on both legs.  He discontinued his IV line.

SKIN:  He had redness and erythema in the sacral area with small skin

break.



LABORATORY DATA:  Showed white count of 10.4, hemoglobin of 13.7, platelet

count of 175.  BUN of 40 and creatinine of 1.6, glucose of 284.

Urinalysis showed +3 protein, +2 glucose, +5 occult blood, too numerous to

count red blood cells, and WBCs of 0 to 2.  Microbiology, influenza

screening for A and B both negative. Previous blood culture while he was

hospitalized in [place] on his previous admission were negative.



IMAGING:  Chest x-ray showed some increased atelectases at the right lung

base, no change otherwise, patchy interstitial edema versus pneumonitis.

 



ASSESSMENT AND RECOMMENDATION:

1. Healthcare-acquired pneumonia.  We will send sputum culture and

continue vancomycin with Zosyn empiric coverage.  Monitor chest x-ray.

2. Urinary tract infection.  We will send urine culture.  The patient is

already on Zosyn.

3. Sepsis due to the above.  Continue vancomycin and Zosyn empiric

coverage.  Pending blood culture result which was obtained in the

emergency room.

4. Acute respiratory failure due to the above.  Continue inhalers and

high-flow oxygen.  Monitor oxygen level and chest x-ray.

5. Diabetes.  Recommend tight glycemic control to keep blood glucose

between 100 to 140.

6. Fever due to pneumonia and sepsis.  Continue wide-spectrum

antibiotics and Tylenol as needed.



Infectious Diseases will continue to follow.









  ______________________________________________

  Amira Gross M.D.





DR:  Trixie

D:  03/07/2018 15:19

T:  03/07/2018 22:35

JOB#:  [id number]

CC:



KARRI

## 2018-03-07 NOTE — HISTORY & PHYSICAL
History and Physical


History & Physicial


discharged two days ago to ECF with Doxy and Augmentin and chest PT


desaturated 





80-year-old male presents ED for evaluation.  Patient presents from nursing 

home in respiratory distress.  Noted by nursing staff this morning to have 

crackles, difficulty breathing, hypoxic.  Patient dementia at baseline.  States 

patient was recently discharged from here for treatment of aspiration 

pneumonia.  Patient is unable to provide any additional history at this time.  

Unclear whether patient experienced another episode of aspiration pneumonia.  

Power of  states that patient is a DO NOT INTUBATE however compressions 

okay if required.  No other aggravating or relieving factors.  Denies any other 

associated symptoms





Past Medical History:  DM, HTN, CVA/TIA, dementia


Past Surgical History:  none


Pertinent Family History:  none


Social History:  Denies: smoking, alcohol use, drug use


Immunizations:  UTD


Reviewed Nursing Documentation:  PMH: Agreed, PSxH: Agreed








Past Medical History:  No History, Except For


Hx Cardiac Problems:  Yes


Hx Hypertension:  Yes


Hx COPD:  Yes - PNA


Hx Diabetes:  Yes


Hx Cancer:  No


Hx Gastrointestinal Problems:  Yes


Hx Neurological Problems:  Yes


Hx Cerebrovascular Accident:  Yes - muscle weakness


Hx Dementia:  Yes


Hx Dysphasia:  Yes








Plan:





Antibiotics-


Pulmonary toilet


LTAC ??











NAY CALDWELL Mar 7, 2018 13:05

## 2018-03-07 NOTE — INFECTIOUS DISEASES PROG NOTE
Assessment/Plan


Problems:  


(1) HCAP (healthcare-associated pneumonia)


Assessment & Plan:  will send sputum culture and continue vancomycin with zosyn 

empiric coverage 





(2) UTI (urinary tract infection)


Assessment & Plan:  will send urine culture, already on zosyn 





(3) Sepsis


Assessment & Plan:  due to the above, continue vancomycin and zosyn pending 

blood culture 





(4) Acute respiratory failure


Assessment & Plan:  due to the above , continue inhalers and high flow oxygen, 

monitor oxygen  





(5) Diabetes mellitus


Assessment & Plan:  recommend tight glycemic control to keep blood glucose 

between 100-140 





(6) Fever


Assessment & Plan:  due to the above, continue wide spectrum antibiotics , and 

Tylenol PRN 








Subjective


Allergies:  


Coded Allergies:  


     No Known Allergies (Unverified , 3/23/16)





Objective


Vital Signs





Last 24 Hour Vital Signs








  Date Time  Temp Pulse Resp B/P (MAP) Pulse Ox O2 Delivery O2 Flow Rate FiO2


 


3/7/18 14:03  110 20  97 Non-Rebreather 15.0 100


 


3/7/18 14:03        100


 


3/7/18 13:36  108 20  99 Non-Rebreather 15.0 100


 


3/7/18 12:00 98.0 112 23 105/57 96 Non-Rebreather 15.0 





 98.0       


 


3/7/18 10:55 98.5 110 30 100/54 100 Non-Rebreather 15.0 


 


3/7/18 10:40 98.1 120 28 152/71 96 Non-Rebreather 15.0 





 98.1       


 


3/7/18 09:57 98.5       





 98.5       


 


3/7/18 09:29  110 33 113/54 100 Non-Rebreather 15.0 


 


3/7/18 08:47  113 26 100/59 97 Simple Mask 10.0 


 


3/7/18 07:50  119 29 102/61 91 Nasal Cannula 10.0 


 


3/7/18 06:56  113 25   Room Air  


 


3/7/18 06:56 99.6 113 25 111/62 94 Non-Rebreather 10.0 





 99.6       


 


3/7/18 06:43 99.6 116 29 111/62 87 Room Air  





 99.7       








Height (Feet):  6


Height (Inches):  1.00


Weight (Pounds):  195





Microbiology








 Date/Time


Source Procedure


Growth Status


 


 


 3/7/18 06:55


Nasal Nares Influenza Types A,B Antigen (LINO) - Final Complete











Laboratory Tests








Test


  3/7/18


06:50 3/7/18


07:00 3/7/18


07:05 3/7/18


08:15


 


White Blood Count


  10.4 K/UL


(4.8-10.8) 


  


  


 


 


Red Blood Count


  4.15 M/UL


(4.70-6.10)  L 


  


  


 


 


Hemoglobin


  13.7 G/DL


(14.2-18.0)  L 


  


  


 


 


Hematocrit


  39.9 %


(42.0-52.0)  L 


  


  


 


 


Mean Corpuscular Volume 96 FL (80-99)     


 


Mean Corpuscular Hemoglobin


  33.1 PG


(27.0-31.0)  H 


  


  


 


 


Mean Corpuscular Hemoglobin


Concent 34.5 G/DL


(32.0-36.0) 


  


  


 


 


Red Cell Distribution Width


  12.5 %


(11.6-14.8) 


  


  


 


 


Platelet Count


  175 K/UL


(150-450) 


  


  


 


 


Mean Platelet Volume


  7.3 FL


(6.5-10.1) 


  


  


 


 


Neutrophils (%) (Auto)


  % (45.0-75.0)


  


  


  


 


 


Lymphocytes (%) (Auto)


  % (20.0-45.0)


  


  


  


 


 


Monocytes (%) (Auto)  % (1.0-10.0)     


 


Eosinophils (%) (Auto)  % (0.0-3.0)     


 


Basophils (%) (Auto)  % (0.0-2.0)     


 


Differential Total Cells


Counted 100  


  


  


  


 


 


Neutrophils % (Manual) 82 % (45-75)  H   


 


Lymphocytes % (Manual) 11 % (20-45)  L   


 


Monocytes % (Manual) 2 % (1-10)     


 


Eosinophils % (Manual) 0 % (0-3)     


 


Basophils % (Manual) 0 % (0-2)     


 


Band Neutrophils 5 % (0-8)     


 


Platelet Estimate Adequate     


 


Platelet Morphology Normal     


 


Red Blood Cell Morphology Normal     


 


Sodium Level


  141 MMOL/L


(136-145) 


  


  


 


 


Potassium Level


  4.2 MMOL/L


(3.5-5.1) 


  


  


 


 


Chloride Level


  103 MMOL/L


() 


  


  


 


 


Carbon Dioxide Level


  32 MMOL/L


(21-32) 


  


  


 


 


Anion Gap


  6 mmol/L


(5-15) 


  


  


 


 


Blood Urea Nitrogen


  40 mg/dL


(7-18)  H 


  


  


 


 


Creatinine


  1.6 MG/DL


(0.55-1.30)  H 


  


  


 


 


Estimat Glomerular Filtration


Rate  mL/min (>60)  


  


  


  


 


 


Glucose Level


  284 MG/DL


()  H 


  


  


 


 


Lactic Acid Level


  2.20 mmol/L


(0.66-2.22) 


  


  2.40 mmol/L


(0.66-2.22)  H


 


Calcium Level


  9.3 MG/DL


(8.5-10.1) 


  


  


 


 


Total Bilirubin


  0.6 MG/DL


(0.2-1.0) 


  


  


 


 


Aspartate Amino Transf


(AST/SGOT) 25 U/L (15-37)


  


  


  


 


 


Alanine Aminotransferase


(ALT/SGPT) 38 U/L (12-78)


  


  


  


 


 


Alkaline Phosphatase


  83 U/L


() 


  


  


 


 


Total Creatine Kinase


  34 U/L


() 


  


  


 


 


Creatine Kinase MB


  < 0.5 NG/ML


(0.0-3.6) 


  


  


 


 


Creatine Kinase MB Relative


Index 1.4  


  


  


  


 


 


Troponin I


  0.000 ng/mL


(0.000-0.056) 


  


  


 


 


Pro-B-Type Natriuretic Peptide


  631 pg/mL


(0-125)  H 


  


  


 


 


Total Protein


  7.3 G/DL


(6.4-8.2) 


  


  


 


 


Albumin


  1.9 G/DL


(3.4-5.0)  L 


  


  


 


 


Globulin 5.4 g/dL     


 


Albumin/Globulin Ratio


  0.4 (1.0-2.7)


L 


  


  


 


 


Arterial Blood pH


  


  7.450


(7.350-7.450) 


  


 


 


Arterial Blood Partial


Pressure CO2 


  43.7 mmHg


(35.0-45.0) 


  


 


 


Arterial Blood Partial


Pressure O2 


  72.8 mmHg


(75.0-100.0)  L 


  


 


 


Arterial Blood HCO3


  


  29.7 mmol/L


(22.0-26.0)  H 


  


 


 


Arterial Blood Oxygen


Saturation 


  94.5 %


(92.0-98.0) 


  


 


 


Arterial Blood Base Excess  5.1    


 


Hernan Test  Positive    


 


Urine Color   Pale yellow   


 


Urine Appearance   Clear   


 


Urine pH   7 (4.5-8.0)   


 


Urine Specific Gravity


  


  


  1.010


(1.005-1.035) 


 


 


Urine Protein


  


  


  3+ (NEGATIVE)


H 


 


 


Urine Glucose (UA)


  


  


  2+ (NEGATIVE)


H 


 


 


Urine Ketones


  


  


  Negative


(NEGATIVE) 


 


 


Urine Occult Blood


  


  


  5+ (NEGATIVE)


H 


 


 


Urine Nitrite


  


  


  Negative


(NEGATIVE) 


 


 


Urine Bilirubin


  


  


  Negative


(NEGATIVE) 


 


 


Urine Urobilinogen


  


  


  Normal MG/DL


(0.0-1.0) 


 


 


Urine Leukocyte Esterase


  


  


  Negative


(NEGATIVE) 


 


 


Urine RBC


  


  


  Tntc /HPF (0 -


0)  H 


 


 


Urine WBC


  


  


  0-2 /HPF (0 -


0) 


 


 


Urine Squamous Epithelial


Cells 


  


  Few /LPF


(NONE/OCC) 


 


 


Urine Bacteria


  


  


  Few /HPF


(NONE) 


 











Current Medications








 Medications


  (Trade)  Dose


 Ordered  Sig/Shasta


 Route


 PRN Reason  Start Time


 Stop Time Status Last Admin


Dose Admin


 


 Acetaminophen


  (Tylenol)  650 mg  Q6H  PRN


 GT


 Mild Pain/Temp > 100.5  3/7/18 12:15


 4/6/18 12:14   


 


 


 Albuterol Sulfate


  (Proventil)  2.5 mg  TIDRT


 HHN


   3/7/18 13:00


 3/12/18 12:59  3/7/18 13:43


 


 


 Aspirin


  (ASA)  81 mg  DAILY


 GT


   3/8/18 09:00


 4/7/18 08:59   


 


 


 Docusate Sodium


  (Colace)  100 mg  TID


 GT


   3/7/18 13:00


 4/6/18 12:59  3/7/18 13:23


 


 


 Furosemide


  (Lasix)  40 mg  DAILY


 GT


   3/8/18 09:00


 4/7/18 08:59   


 


 


 Insulin Detemir


  (Levemir)  30 units  DAILY


 SUBQ


   3/8/18 09:00


 4/7/18 08:59   


 


 


 Metoclopramide HCl


  (Reglan)  5 mg  EVERY 6  HOURS


 GT


   3/7/18 12:30


 4/6/18 12:29  3/7/18 13:23


 


 


 Metoprolol


 Tartrate


  (Lopressor)  12.5 mg  Q12HR


 ORAL


   3/7/18 21:00


 4/6/18 20:59   


 


 


 Piperacillin Sod/


 Tazobactam Sod


 3.375 gm/Sodium


 Chloride  110 ml @ 


 27.5 mls/hr  EVERY 8  HOURS


 IVPB


   3/7/18 14:00


 3/12/18 13:59   


 


 


 Sitagliptin


 Phosphate


  (Januvia)  50 mg  DAILY


 GT


   3/8/18 09:00


 4/7/18 08:59   


 


 


 Theophylline


  (Theophylline)  80 mg  Q8HR


 GT


   3/7/18 14:00


 4/6/18 13:59   


 


 


 Vancomycin HCl


  (Vanco rx to


 dose)  1 ea  DAILY  PRN


 MISC


 Per rx protocol  3/7/18 12:00


 4/6/18 11:59   


 


 


 Vancomycin HCl/


 Dextrose  250 ml @ 


 125 mls/hr  ONCE  ONCE


 IVPB


   3/7/18 13:30


 3/7/18 15:29  3/7/18 14:33


 

















Amira Gross M.D. Mar 7, 2018 14:48

## 2018-03-07 NOTE — EMERGENCY ROOM REPORT
History of Present Illness


General


Chief Complaint:  Dyspnea/Respdistress


Source:  Medical Record





Present Illness


HPI


80-year-old male presents ED for evaluation.  Patient presents from nursing 

home in respiratory distress.  Noted by nursing staff this morning to have 

crackles, difficulty breathing, hypoxic.  Patient dementia at baseline.  States 

patient was recently discharged from here for treatment of aspiration 

pneumonia.  Patient is unable to provide any additional history at this time.  

Unclear whether patient experienced another episode of aspiration pneumonia.  

Power of  states that patient is a DO NOT INTUBATE however compressions 

okay if required.  No other aggravating or relieving factors.  Denies any other 

associated symptoms


Allergies:  


Coded Allergies:  


     No Known Allergies (Unverified , 3/23/16)





Patient History


Past Medical History:  DM, HTN, CVA/TIA, dementia


Past Surgical History:  none


Pertinent Family History:  none


Social History:  Denies: smoking, alcohol use, drug use


Immunizations:  UTD


Reviewed Nursing Documentation:  PMH: Agreed, PSxH: Agreed





Nursing Documentation-PMH


Past Medical History:  No History, Except For


Hx Cardiac Problems:  Yes


Hx Hypertension:  Yes


Hx COPD:  Yes - PNA


Hx Diabetes:  Yes


Hx Cancer:  No


Hx Gastrointestinal Problems:  Yes


Hx Neurological Problems:  Yes


Hx Cerebrovascular Accident:  Yes - muscle weakness


Hx Dementia:  Yes


Hx Dysphasia:  Yes





Review of Systems


All Other Systems:  limited





Physical Exam





Vital Signs








  Date Time  Temp Pulse Resp B/P (MAP) Pulse Ox O2 Delivery O2 Flow Rate FiO2


 


3/7/18 06:43 99.6 116 29 111/62 87 Room Air  





 99.7       


 


3/7/18 06:56       10.0 








Sp02 EP Interpretation:  reviewed, normal


General Appearance:  GCS 15, non-toxic, lethargic, thin


Head:  normocephalic, atraumatic


Eyes:  bilateral eye normal inspection, bilateral eye PERRL


ENT:  hearing grossly normal, normal pharynx, no angioedema, normal voice


Neck:  full range of motion, supple/symm/no masses


Respiratory:  chest non-tender, crackles, speaking full sentences


Cardiovascular #1:  no edema, tachycardia


Cardiovascular #2:  2+ carotid (R), 2+ carotid (L), 2+ radial (R), 2+ radial (L)

, 2+ dorsalis pedis (R), 2+ dorsalis pedis (L)


Gastrointestinal:  normal bowel sounds, non tender, soft, non-distended, no 

guarding, no rebound


Rectal:  deferred


Genitourinary:  normal inspection, no CVA tenderness


Musculoskeletal:  back normal, gait/station normal, normal range of motion, non-

tender


Neurologic:  other - dementia


Psychiatric:  other - dementia


Reflexes:  3+ bicep (R), 3+ bicep (L), 3+ tricep (R), 3+ tricep (L), 3+ knee (R)

, 3+ knee (L)


Skin:  normal color, no rash, warm/dry, well hydrated


Lymphatic:  no adenopathy





Medical Decision Making


Diagnostic Impression:  


 Primary Impression:  


 Acute respiratory failure


 Qualified Codes:  J96.01 - Acute respiratory failure with hypoxia


 Additional Impressions:  


 Dehydration


 Dementia


 Qualified Codes:  F03.90 - Unspecified dementia without behavioral disturbance


ER Course


Hospital Course 


88-year-old M presenting to ED with respiratory distress, cough and crackles





Differential diagnoses include: Pneumonia, CHF exacerbation, pneumothorax, 

fluid overload





Clinical course


Patient placed on stretcher.  On cardiac monitor with hypoxia on room air and 

tachycardia.  After initial history and physical, respiratory therapist 

provided deep suctioning.  I ordered labs, IV fluids, EKG, chest x-ray, blood 

cultures, UA.  Patient placed on NRB with O2 saturation improving





Labs -no leukocytosis, hemoglobin/hematocrit stable, BUN/Cr elevated, lactate > 

2, troponins negative 


CXR - R lower lobe consolidation, clinically improving from prior CXRs





Patient O2 sat remains above 90% on nasal cannula.  tachycardia improving with 

IV fluids. Abx given





Discussed case with power of .  She states the patient is DO NOT 

INTUBATE but compressions are okay if required





Case discussed with Dr. Fouladin and he agreed to the patient to his service 

for further care and support





I feel this is a highly complex case requiring extensive working including EKG/

Rhythm strip, Xray/CT/US, Blood/urine lab work, repeat exams while in ED, and 

administration of strong opiates/narcotics for pain control, admission to 

hospital or close patient follow up.  





Diagnosis - acute respiratory failure, dehydration, dementia





Patient admitted to [facility] in serious condition





Labs








Test


  3/7/18


06:50 3/7/18


07:05


 


White Blood Count


  10.4 K/UL


(4.8-10.8) 


 


 


Red Blood Count


  4.15 M/UL


(4.70-6.10) 


 


 


Hemoglobin


  13.7 G/DL


(14.2-18.0) 


 


 


Hematocrit


  39.9 %


(42.0-52.0) 


 


 


Mean Corpuscular Volume 96 FL (80-99)  


 


Mean Corpuscular Hemoglobin


  33.1 PG


(27.0-31.0) 


 


 


Mean Corpuscular Hemoglobin


Concent 34.5 G/DL


(32.0-36.0) 


 


 


Red Cell Distribution Width


  12.5 %


(11.6-14.8) 


 


 


Platelet Count


  175 K/UL


(150-450) 


 


 


Mean Platelet Volume


  7.3 FL


(6.5-10.1) 


 


 


Neutrophils (%) (Auto)  % (45.0-75.0)  


 


Lymphocytes (%) (Auto)  % (20.0-45.0)  


 


Monocytes (%) (Auto)  % (1.0-10.0)  


 


Eosinophils (%) (Auto)  % (0.0-3.0)  


 


Basophils (%) (Auto)  % (0.0-2.0)  


 


Differential Total Cells


Counted 100 


  


 


 


Neutrophils % (Manual) 82 % (45-75)  


 


Lymphocytes % (Manual) 11 % (20-45)  


 


Monocytes % (Manual) 2 % (1-10)  


 


Eosinophils % (Manual) 0 % (0-3)  


 


Basophils % (Manual) 0 % (0-2)  


 


Band Neutrophils 5 % (0-8)  


 


Platelet Estimate Adequate  


 


Platelet Morphology Normal  


 


Red Blood Cell Morphology Normal  


 


Sodium Level


  141 MMOL/L


(136-145) 


 


 


Potassium Level


  4.2 MMOL/L


(3.5-5.1) 


 


 


Chloride Level


  103 MMOL/L


() 


 


 


Carbon Dioxide Level


  32 MMOL/L


(21-32) 


 


 


Anion Gap


  6 mmol/L


(5-15) 


 


 


Blood Urea Nitrogen


  40 mg/dL


(7-18) 


 


 


Creatinine


  1.6 MG/DL


(0.55-1.30) 


 


 


Estimat Glomerular Filtration


Rate  mL/min (>60) 


  


 


 


Glucose Level


  284 MG/DL


() 


 


 


Lactic Acid Level


  2.20 mmol/L


(0.66-2.22) 


 


 


Calcium Level


  9.3 MG/DL


(8.5-10.1) 


 


 


Total Bilirubin


  0.6 MG/DL


(0.2-1.0) 


 


 


Aspartate Amino Transf


(AST/SGOT) 25 U/L (15-37) 


  


 


 


Alanine Aminotransferase


(ALT/SGPT) 38 U/L (12-78) 


  


 


 


Alkaline Phosphatase


  83 U/L


() 


 


 


Total Creatine Kinase


  34 U/L


() 


 


 


Creatine Kinase MB


  < 0.5 NG/ML


(0.0-3.6) 


 


 


Creatine Kinase MB Relative


Index 1.4 


  


 


 


Troponin I


  0.000 ng/mL


(0.000-0.056) 


 


 


Pro-B-Type Natriuretic Peptide


  631 pg/mL


(0-125) 


 


 


Total Protein


  7.3 G/DL


(6.4-8.2) 


 


 


Albumin


  1.9 G/DL


(3.4-5.0) 


 


 


Globulin 5.4 g/dL  


 


Albumin/Globulin Ratio 0.4 (1.0-2.7)  


 


Urine Color  Pale yellow 


 


Urine Appearance  Clear 


 


Urine pH  7 (4.5-8.0) 


 


Urine Specific Gravity


  


  1.010


(1.005-1.035)


 


Urine Protein  3+ (NEGATIVE) 


 


Urine Glucose (UA)  2+ (NEGATIVE) 


 


Urine Ketones


  


  Negative


(NEGATIVE)


 


Urine Occult Blood  5+ (NEGATIVE) 


 


Urine Nitrite


  


  Negative


(NEGATIVE)


 


Urine Bilirubin


  


  Negative


(NEGATIVE)


 


Urine Urobilinogen


  


  Normal MG/DL


(0.0-1.0)


 


Urine Leukocyte Esterase


  


  Negative


(NEGATIVE)


 


Urine RBC


  


  Tntc /HPF (0 -


0)


 


Urine WBC


  


  0-2 /HPF (0 -


0)


 


Urine Squamous Epithelial


Cells 


  Few /LPF


(NONE/OCC)


 


Urine Bacteria


  


  Few /HPF


(NONE)








EKG Diagnostic Results


Rate:  tachycardiac


Rhythm:  NSR


ST Segments:  no acute changes


ASA given to the pt in ED:  No





Rhythm Strip Diag. Results


EP Interpretation:  yes


Rhythm:  NSR, no PVC's, no ectopy





Chest X-Ray Diagnostic Results


Chest X-Ray Diagnostic Results :  


   Chest X-Ray Ordered:  Yes


   Indication:  Shortness of Breath


   EP Interpretation:  Yes


   Interpretation:  no pneumothorax, other - consoliation, R lung base. 

improving over prior CXR


   Impression:  Other - pna


   Electronically Signed by:  Electronically signed by Uche Gonzalez MD





Last Vital Signs








  Date Time  Temp Pulse Resp B/P (MAP) Pulse Ox O2 Delivery O2 Flow Rate FiO2


 


3/7/18 06:56  113 25   Room Air  


 


3/7/18 06:56 99.6   111/62 94  10.0 





 99.6       








Status:  improved


Disposition:  ADMITTED AS INPATIENT


Condition:  Serious











UCHE GONZALEZ M.D. Mar 7, 2018 07:51

## 2018-03-08 VITALS — SYSTOLIC BLOOD PRESSURE: 125 MMHG | DIASTOLIC BLOOD PRESSURE: 68 MMHG

## 2018-03-08 VITALS — DIASTOLIC BLOOD PRESSURE: 99 MMHG | SYSTOLIC BLOOD PRESSURE: 145 MMHG

## 2018-03-08 VITALS — SYSTOLIC BLOOD PRESSURE: 116 MMHG | DIASTOLIC BLOOD PRESSURE: 60 MMHG

## 2018-03-08 VITALS — DIASTOLIC BLOOD PRESSURE: 71 MMHG | SYSTOLIC BLOOD PRESSURE: 118 MMHG

## 2018-03-08 VITALS — SYSTOLIC BLOOD PRESSURE: 124 MMHG | DIASTOLIC BLOOD PRESSURE: 71 MMHG

## 2018-03-08 VITALS — DIASTOLIC BLOOD PRESSURE: 69 MMHG | SYSTOLIC BLOOD PRESSURE: 131 MMHG

## 2018-03-08 RX ADMIN — ALBUTEROL SULFATE SCH MG: 2.5 SOLUTION RESPIRATORY (INHALATION) at 19:24

## 2018-03-08 RX ADMIN — METOCLOPRAMIDE HYDROCHLORIDE SCH MG: 5 SOLUTION ORAL at 12:26

## 2018-03-08 RX ADMIN — DOCUSATE SODIUM SCH MG: 50 LIQUID ORAL at 09:32

## 2018-03-08 RX ADMIN — METOCLOPRAMIDE HYDROCHLORIDE SCH MG: 5 SOLUTION ORAL at 17:41

## 2018-03-08 RX ADMIN — FUROSEMIDE SCH MG: 40 TABLET ORAL at 09:33

## 2018-03-08 RX ADMIN — DEXTROSE MONOHYDRATE SCH MLS/HR: 50 INJECTION, SOLUTION INTRAVENOUS at 14:10

## 2018-03-08 RX ADMIN — METOCLOPRAMIDE HYDROCHLORIDE SCH MG: 5 SOLUTION ORAL at 06:40

## 2018-03-08 RX ADMIN — METOPROLOL TARTRATE SCH MG: 25 TABLET, FILM COATED ORAL at 09:33

## 2018-03-08 RX ADMIN — INSULIN DETEMIR SCH UNITS: 100 INJECTION, SOLUTION SUBCUTANEOUS at 09:35

## 2018-03-08 RX ADMIN — ALBUTEROL SULFATE SCH MG: 2.5 SOLUTION RESPIRATORY (INHALATION) at 12:26

## 2018-03-08 RX ADMIN — SODIUM CHLORIDE SCH MLS/HR: 9 INJECTION, SOLUTION INTRAVENOUS at 21:00

## 2018-03-08 RX ADMIN — ALBUTEROL SULFATE SCH MG: 2.5 SOLUTION RESPIRATORY (INHALATION) at 06:42

## 2018-03-08 RX ADMIN — METOPROLOL TARTRATE SCH MG: 25 TABLET, FILM COATED ORAL at 21:32

## 2018-03-08 RX ADMIN — DOCUSATE SODIUM SCH MG: 50 LIQUID ORAL at 12:26

## 2018-03-08 RX ADMIN — DEXTROSE MONOHYDRATE SCH MLS/HR: 50 INJECTION, SOLUTION INTRAVENOUS at 06:40

## 2018-03-08 RX ADMIN — SITAGLIPTIN SCH MG: 50 TABLET, FILM COATED ORAL at 09:33

## 2018-03-08 RX ADMIN — DEXTROSE MONOHYDRATE SCH MLS/HR: 50 INJECTION, SOLUTION INTRAVENOUS at 21:32

## 2018-03-08 RX ADMIN — METOCLOPRAMIDE HYDROCHLORIDE SCH MG: 5 SOLUTION ORAL at 00:59

## 2018-03-08 RX ADMIN — DOCUSATE SODIUM SCH MG: 50 LIQUID ORAL at 17:41

## 2018-03-08 RX ADMIN — ASPIRIN 81 MG SCH MG: 81 TABLET ORAL at 09:33

## 2018-03-08 NOTE — GENERAL PROGRESS NOTE
Assessment/Plan


Problem List:  


(1) Acute respiratory failure with hypoxia


ICD Codes:  J96.01 - Acute respiratory failure with hypoxia


SNOMED:  30921046, 805584407


(2) Dementia


ICD Codes:  F03.90 - Unspecified dementia without behavioral disturbance


SNOMED:  90554831


Qualifiers:  


   Qualified Codes:  F03.90 - Unspecified dementia without behavioral 

disturbance


(3) Functional quadriplegia


ICD Codes:  R53.2 - Functional quadriplegia


SNOMED:  395282847557153


(4) Feeding by G-tube


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


SNOMED:  825283157, 957677219


Status:  stable


Status Narrative





- Acute hypoxic respiratory failure





- HCAP (healthcare-associated pneumonia)


- UTI (urinary tract infection)


- Sepsis


- Diabetes mellitus


- Fever


Assessment/Plan





breathing treatment-


Antibiotics-


per orders





Subjective


ROS Limited/Unobtainable:  No


Constitutional:  Reports: malaise, weakness


Allergies:  


Coded Allergies:  


     No Known Allergies (Unverified , 3/23/16)





Objective





Last 24 Hour Vital Signs








  Date Time  Temp Pulse Resp B/P (MAP) Pulse Ox O2 Delivery O2 Flow Rate FiO2


 


3/8/18 09:33  111  125/68    


 


3/8/18 08:00 97.8 111 18 125/68 99 Non-Rebreather 15.0 





 97.8       


 


3/8/18 08:00  111      


 


3/8/18 06:55  106 20  98 Non-Rebreather 15.0 100


 


3/8/18 06:43      Non-Rebreather 15.0 100


 


3/8/18 06:43  105 20  98 Non-Rebreather 15.0 100


 


3/8/18 06:43     98 Non-Rebreather 15.0 100


 


3/8/18 04:00 98.6 102 20 118/71 98 Non-Rebreather 15.0 





 98.6       


 


3/8/18 04:00  102      


 


3/8/18 00:00  103      


 


3/8/18 00:00 98.3 103 21 124/71 98 Non-Rebreather 15.0 





 98.3       


 


3/7/18 21:09  99  108/61    


 


3/7/18 20:00 97.8 106 22 153/97 98 Non-Rebreather 15.0 





 97.8       


 


3/7/18 20:00  106      


 


3/7/18 19:54  102 20  98 Non-Rebreather 15.0 100


 


3/7/18 19:39        100


 


3/7/18 19:39  96 20  98 Non-Rebreather 15.0 100


 


3/7/18 19:39     98 Non-Rebreather 15.0 100


 


3/7/18 19:38      Non-Rebreather 15.0 100


 


3/7/18 18:19      Non-Rebreather 15.0 100


 


3/7/18 16:00  103      


 


3/7/18 16:00 98.1 100 22 104/58 98 Non-Rebreather 15.0 





 98.1       


 


3/7/18 14:03  110 20  97 Non-Rebreather 15.0 100


 


3/7/18 14:03        100


 


3/7/18 13:36     97 Non-Rebreather 15.0 100


 


3/7/18 13:36  108 20  99 Non-Rebreather 15.0 100

















Intake and Output  


 


 3/7/18 3/8/18





 19:00 07:00


 


Intake Total 2765.0 ml 715.0 ml


 


Output Total 300 ml 450 ml


 


Balance 2465.0 ml 265.0 ml


 


  


 


Intake Oral 0 ml 


 


Free Water 200 ml 200 ml


 


IV Total 2455.0 ml 165.0 ml


 


Tube Feeding 110 ml 350 ml


 


Output Urine Total 300 ml 450 ml


 


# Bowel Movements 8 7











Current Medications








 Medications


  (Trade)  Dose


 Ordered  Sig/Shasta


 Route


 PRN Reason  Start Time


 Stop Time Status Last Admin


Dose Admin


 


 Acetaminophen


  (Tylenol)  650 mg  Q6H  PRN


 GT


 Mild Pain/Temp > 100.5  3/7/18 12:15


 4/6/18 12:14   


 


 


 Albuterol Sulfate


  (Proventil)  2.5 mg  TIDRT


 HHN


   3/7/18 13:00


 3/12/18 12:59  3/8/18 06:42


 


 


 Aspirin


  (ASA)  81 mg  DAILY


 GT


   3/8/18 09:00


 4/7/18 08:59  3/8/18 09:33


 


 


 Docusate Sodium


  (Colace)  100 mg  TID


 GT


   3/7/18 13:00


 4/6/18 12:59  3/8/18 09:32


 


 


 Furosemide


  (Lasix)  40 mg  DAILY


 GT


   3/8/18 09:00


 4/7/18 08:59  3/8/18 09:33


 


 


 Insulin Detemir


  (Levemir)  30 units  DAILY


 SUBQ


   3/8/18 09:00


 4/7/18 08:59  3/8/18 09:35


 


 


 Metoclopramide HCl


  (Reglan)  5 mg  EVERY 6  HOURS


 GT


   3/7/18 12:30


 4/6/18 12:29  3/8/18 06:40


 


 


 Metoprolol


 Tartrate


  (Lopressor)  12.5 mg  Q12HR


 ORAL


   3/7/18 21:00


 4/6/18 20:59  3/8/18 09:33


 


 


 Piperacillin Sod/


 Tazobactam Sod


 3.375 gm/Sodium


 Chloride  110 ml @ 


 27.5 mls/hr  EVERY 8  HOURS


 IVPB


   3/7/18 14:00


 3/12/18 13:59  3/8/18 06:40


 


 


 Sitagliptin


 Phosphate


  (Januvia)  50 mg  DAILY


 GT


   3/8/18 09:00


 4/7/18 08:59  3/8/18 09:33


 


 


 Theophylline


  (Theophylline)  80 mg  Q8HR


 GT


   3/7/18 14:00


 4/6/18 13:59  3/8/18 06:40


 


 


 Vancomycin HCl


  (Vanco rx to


 dose)  1 ea  DAILY  PRN


 MISC


 Per rx protocol  3/7/18 12:00


 4/6/18 11:59   


 








Height (Feet):  6


Height (Inches):  1.00


Weight (Pounds):  171


General Appearance:  no apparent distress, lethargic


Cardiovascular:  tachycardia


Respiratory/Chest:  decreased breath sounds, rhonchi - bilaterally


Abdomen:  soft











NAY CALDWELL 8, 2018 12:05

## 2018-03-08 NOTE — DISCHARGE SUMMARY
Discharge Summary


Hospital Course


Date of Admission


Feb 27, 2018 at 08:00


Date of Discharge


Mar 5, 2018 at 20:20


Admitting Diagnosis


respiratory distress


HPI


Jolly Shaw is a 88 year old male who was admitted on Feb 27, 2018 at 08:00 for 

Respiratory Distress


Hospital Course


dc summary #[id number]





Discharge Medications


New Medications:  


Amoxicillin/Potassium Clav 250-62.5/5 Susp (Amox Tr-K Clv 250-62.5/5 Susp) 250 

Mg/5 Ml Susp.recon


500 MG NG Q8HR for 10 Days, ML





Furosemide* (Lasix*) 40 Mg Tablet


40 MG GT DAILY for 30 Days, TAB





Theophylline (Theophylline) 80 Mg/15 Ml Elixir


80 MG GT EVERY 12 HOURS for 30 Days, ML





 


Continued Medications:  


Albuterol Sulfate* (Albuterol Sulfate Hhn*) 2.5 Mg/3 Ml Vial.neb


2.5 MG HHN TIDRT for 10 Days, VIAL





Aspirin* (Aspir 81*) 81 Mg Tablet.dr


81 MG GT DAILY, TAB





Insulin Detemir (Levemir Flexpen) 100 Unit/1 Ml Insuln.pen


30 UNITS SUBQ Q12HR for 30 Days, EA





Metoclopramide HCl (Metoclopramide HCl) 10 Mg/10 Ml Solution


5 MG GT EVERY 6 HOURS for 30 Days, #120 TAB





Metoprolol Tartrate (Metoprolol Tartrate) 25 Mg Tablet


12.5 MG ORAL Q12HR for 30 Days, TAB





Ranitidine Hcl* (Zantac*) 150 Mg Tablet


150 MG GT TWICE A DAY, TAB











Discharge


Condition Upon Discharge:  stable


Discharge Disposition


Patient was discharged to SNF/Subacute Facility(03)


Discharge Diagnoses:  





Discharge Instructions


Discharge Instructions


Follow up with:  francisco j in [facility] with me











[place] (Vancsanjeev)Mary NP Mar 8, 2018 11:25

## 2018-03-08 NOTE — CONSULTATION
History of Present Illness


General


Date patient seen:  Mar 8, 2018


Chief Complaint:  Dyspnea/Respdistress


Referring physician:  Dr. Schilling


Reason for Consultation:  dyspnea





Present Illness


HPI


 88-year-old male with hx of dementia, Gtube, hypertension, diabetes and 

previous sepsis from a nursing home, just discharged from [facility] after being 

treated for sepsis and pneumonia,  presented with dyspnea, congeston with 

hypoxia and tachycardia.  Pt cant give any history and all information is 

obtained from the chart.  Pt's durable power of  is at the bed site who 

is giving the hx.


Allergies:  


Coded Allergies:  


     No Known Allergies (Unverified , 3/23/16)





Medication History


Scheduled


Albuterol Sulfate* (Albuterol Sulfate Hhn*), 2.5 MG HHN TIDRT


Amoxicillin/Potassium Clav 250-62.5/5 Susp (Amox Tr-K Clv 250-62.5/5 Susp), 500 

MG NG Q8HR


Ascorbic Acid* (Vitamin C*), 500 MG ORAL DAILY, (Reported)


Aspirin* (Aspir 81*), 81 MG GT DAILY, (Reported)


Atorvastatin Calcium* (Atorvastatin Calcium*), 10 MG GT BEDTIME, (Reported)


Balsam [place] Oil (Venelex Ointment), 60 GM TP DAILY, (Reported)


Docusate Sodium (Docusate Sodium), 100 MG GT DAILY, (Reported)


Furosemide* (Lasix*), 40 MG GT DAILY


Heparin Sod (Porcine) (Heparin Sodium*), 5,000 UNITS SUBQ EVERY 12 HOURS, (

Reported)


Insulin Aspart* (Novolog*), 0 SUBQ AC+HS, (Reported)


Insulin Detemir (Levemir Flexpen), 30 UNITS SUBQ Q12HR


Metoclopramide HCl (Metoclopramide HCl), 5 MG GT EVERY 6 HOURS


Metoprolol Tartrate (Metoprolol Tartrate), 12.5 MG ORAL Q12HR


Nitrofurantoin Monohyd/M-Cryst (Nitrofurantoin Mono-Mcr 100 mg), 100 MG ORAL 

EVERY 12 HOURS


Ranitidine Hcl* (Zantac*), 150 MG GT TWICE A DAY, (Reported)


Repaglinide (Prandin), 2 MG GT THREE TIMES A DAY, (Reported)


Sennosides (Senna), 8.6 MG GT QHS, (Reported)


Sitagliptin (Januvia), 50 MG GT ACBREAKFAST


Tamsulosin Hcl (Tamsulosin Hcl*), 0.4 MG GT BEDTIME, (Reported)


Theophylline (Theophylline), 80 MG GT EVERY 12 HOURS


Zinc Sulfate (Zinc Sulfate*), 220 MG GT DAILY, (Reported)





Scheduled PRN


Acetaminophen* (Acetaminophen 325MG Tablet*), 650 MG GT Q6H PRN for Mild Pain/

Temp > 100.5, (Reported)





Miscellaneous Medications


Multivit-Min/Iron Fum/Folic AC (Multi-Vitamin-Minerals Tablet), 1 EACH GT, (

Reported)





Discontinued Medications


Cephalexin* (Keflex*), 500 MG ORAL EVERY 6 HOURS


   Discontinued Reason: Pt had allergic rxn


Levofloxacin* (Levaquin*), 500 MG ORAL DAILY


   Discontinued Reason: Therapy completed





Patient History


Healthcare decision maker


SAMANTHA WISE.


Resuscitation status


Do Not Intubate


Advanced Directive on File


Yes





Past Medical/Surgical History


Past Medical/Surgical History:  


(1) Diabetes mellitus


(2) Dementia


(3) Feeding by G-tube


(4) Functional quadriplegia





Review of Systems


All Other Systems:  negative except mentioned in HPI





Physical Exam


General Appearance:  cachetic


Lines, tubes and drains:  peripheral


Neck:  non-tender, normal alignment


Respiratory/Chest:  chest wall non-tender, lungs clear


Cardiovascular/Chest:  normal peripheral pulses, normal rate


Abdomen:  normal bowel sounds, non tender


Genitourinary/Rectal:  normal genital exam, normal rectal exam


Extremities:  normal range of motion


Skin Exam:  normal pigmentation


Neurologic:  CNs II-XII grossly normal





Last 24 Hour Vital Signs








  Date Time  Temp Pulse Resp B/P (MAP) Pulse Ox O2 Delivery O2 Flow Rate FiO2


 


3/8/18 09:33  111  125/68    


 


3/8/18 08:00 97.8 111 18 125/68 99 Non-Rebreather 15.0 





 97.8       


 


3/8/18 08:00  111      


 


3/8/18 06:55  106 20  98 Non-Rebreather 15.0 100


 


3/8/18 06:43      Non-Rebreather 15.0 100


 


3/8/18 06:43  105 20  98 Non-Rebreather 15.0 100


 


3/8/18 06:43     98 Non-Rebreather 15.0 100


 


3/8/18 04:00 98.6 102 20 118/71 98 Non-Rebreather 15.0 





 98.6       


 


3/8/18 04:00  102      


 


3/8/18 00:00  103      


 


3/8/18 00:00 98.3 103 21 124/71 98 Non-Rebreather 15.0 





 98.3       


 


3/7/18 21:09  99  108/61    


 


3/7/18 20:00 97.8 106 22 153/97 98 Non-Rebreather 15.0 





 97.8       


 


3/7/18 20:00  106      


 


3/7/18 19:54  102 20  98 Non-Rebreather 15.0 100


 


3/7/18 19:39        100


 


3/7/18 19:39  96 20  98 Non-Rebreather 15.0 100


 


3/7/18 19:39     98 Non-Rebreather 15.0 100


 


3/7/18 19:38      Non-Rebreather 15.0 100


 


3/7/18 18:19      Non-Rebreather 15.0 100


 


3/7/18 16:00  103      


 


3/7/18 16:00 98.1 100 22 104/58 98 Non-Rebreather 15.0 





 98.1       


 


3/7/18 14:03  110 20  97 Non-Rebreather 15.0 100


 


3/7/18 14:03        100


 


3/7/18 13:36     97 Non-Rebreather 15.0 100


 


3/7/18 13:36  108 20  99 Non-Rebreather 15.0 100


 


3/7/18 12:00 98.0 112 23 105/57 96 Non-Rebreather 15.0 





 98.0       


 


3/7/18 12:00  117      

















Intake and Output  


 


 3/7/18 3/8/18





 19:00 07:00


 


Intake Total 2765.0 ml 715.0 ml


 


Output Total 300 ml 450 ml


 


Balance 2465.0 ml 265.0 ml


 


  


 


Intake Oral 0 ml 


 


Free Water 200 ml 200 ml


 


IV Total 2455.0 ml 165.0 ml


 


Tube Feeding 110 ml 350 ml


 


Output Urine Total 300 ml 450 ml


 


# Bowel Movements 8 7








Height (Feet):  6


Height (Inches):  1.00


Weight (Pounds):  171


Medications





Current Medications








 Medications


  (Trade)  Dose


 Ordered  Sig/Shasta


 Route


 PRN Reason  Start Time


 Stop Time Status Last Admin


Dose Admin


 


 Acetaminophen


  (Tylenol)  650 mg  Q6H  PRN


 GT


 Mild Pain/Temp > 100.5  3/7/18 12:15


 4/6/18 12:14   


 


 


 Albuterol Sulfate


  (Proventil)  2.5 mg  TIDRT


 HHN


   3/7/18 13:00


 3/12/18 12:59  3/8/18 06:42


 


 


 Aspirin


  (ASA)  81 mg  DAILY


 GT


   3/8/18 09:00


 4/7/18 08:59  3/8/18 09:33


 


 


 Docusate Sodium


  (Colace)  100 mg  TID


 GT


   3/7/18 13:00


 4/6/18 12:59  3/8/18 09:32


 


 


 Furosemide


  (Lasix)  40 mg  DAILY


 GT


   3/8/18 09:00


 4/7/18 08:59  3/8/18 09:33


 


 


 Insulin Detemir


  (Levemir)  30 units  DAILY


 SUBQ


   3/8/18 09:00


 4/7/18 08:59  3/8/18 09:35


 


 


 Metoclopramide HCl


  (Reglan)  5 mg  EVERY 6  HOURS


 GT


   3/7/18 12:30


 4/6/18 12:29  3/8/18 06:40


 


 


 Metoprolol


 Tartrate


  (Lopressor)  12.5 mg  Q12HR


 ORAL


   3/7/18 21:00


 4/6/18 20:59  3/8/18 09:33


 


 


 Piperacillin Sod/


 Tazobactam Sod


 3.375 gm/Sodium


 Chloride  110 ml @ 


 27.5 mls/hr  EVERY 8  HOURS


 IVPB


   3/7/18 14:00


 3/12/18 13:59  3/8/18 06:40


 


 


 Sitagliptin


 Phosphate


  (Januvia)  50 mg  DAILY


 GT


   3/8/18 09:00


 4/7/18 08:59  3/8/18 09:33


 


 


 Theophylline


  (Theophylline)  80 mg  Q8HR


 GT


   3/7/18 14:00


 4/6/18 13:59  3/8/18 06:40


 


 


 Vancomycin HCl


  (Vanco rx to


 dose)  1 ea  DAILY  PRN


 MISC


 Per rx protocol  3/7/18 12:00


 4/6/18 11:59   


 











Assessment/Plan


Problem List:  


(1) Acute respiratory failure


ICD Codes:  J96.00 - Acute respiratory failure, unspecified whether with 

hypoxia or hypercapnia


SNOMED:  75275923


Qualifiers:  


   Qualified Codes:  J96.01 - Acute respiratory failure with hypoxia


(2) HCAP (healthcare-associated pneumonia)


ICD Codes:  J18.9 - Pneumonia, unspecified organism


SNOMED:  473447678


(3) Functional quadriplegia


ICD Codes:  R53.2 - Functional quadriplegia


SNOMED:  887529471285319


(4) Diabetes mellitus


ICD Codes:  E11.9 - Type 2 diabetes mellitus without complications


SNOMED:  31170793


(5) Feeding by G-tube


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


SNOMED:  962914730, [id number]


Assessment/Plan


respiratory treatment


check sputum


titrate fo2 to sat of 92%


chest PT


incentive spirometry


dvt prophylaxis











JOAN DEMARCO Mar 8, 2018 11:23

## 2018-03-08 NOTE — WOUND CARE CONSULTATION
Wound Assessment


Wound Assessment #1:  


   Wound Number:  1


   Wound Present on Admission:  Yes


   New Wound:  No


   Status Change of Wound:  No


   Wound Location Body Site:  other - SACROCOCCYGEAL


   Wound Type:  pressure ulcer


   Elpidio Test:  Does not Elpidio


   Pressure Ulcer Stage:  Deep Tissue Injury


   Wound Thickness:  Full Thickness


   Wound Length:  10.0


   Wound Width:  13.0


   Wound Depth:  UTD


   Percent of Wound Purple/Maroon:  100


   Wound Drainage Amount:  None


   Wound Drainage Odor:  None/Absent


   Tissue Surrounding Wound:  DEEP RED MAROON COLOR


   Wound General Appearance:  Reddened


Wound Assessment #2:  


   Wound Number:  2


   Wound Present on Admission:  Yes


   New Wound:  No


   Status Change of Wound:  No


   Wound Location Body Site Modif:  left


   Wound Location Body Site:  ear - TOP EARFOLD


   Wound Type:  pressure ulcer


   Elpidio Test:  Does not Elpidio


   Pressure Ulcer Stage:  Deep Tissue Injury - SUSPECTED


   Wound Thickness:  Full Thickness


   Wound Length:  1.0


   Wound Width:  1.0


   Wound Depth:  UTD


   Percent of Wound Pink/Red:  100 - DEEP RED


   Wound Drainage Amount:  None


   Wound Drainage Odor:  None/Absent


   Tissue Surrounding Wound:  Intact


   Wound General Appearance:  Reddened


Wound Assessment #3:  


   Wound Number:  3


   Wound Present on Admission:  Yes


   New Wound:  No


   Status Change of Wound:  No


   Wound Location Body Site Modif:  right


   Wound Location Body Site:  ear - TOP EARFOLD


   Wound Type:  pressure ulcer


   Elpidio Test:  Does not Elpidio


   Pressure Ulcer Stage:  Deep Tissue Injury - SUSPECTED


   Wound Thickness:  Full Thickness


   Wound Length:  1.0


   Wound Width:  1.0


   Wound Depth:  UTD


   Percent of Wound Pink/Red:  100 - DEEP RED


   Wound Drainage Amount:  None


   Wound Drainage Odor:  None/Absent


   Tissue Surrounding Wound:  Intact


   Wound General Appearance:  Reddened


Wound Assessment #4:  


   Wound Number:  4


   Wound Present on Admission:  Yes


   New Wound:  No


   Status Change of Wound:  No


   Wound Location Body Site:  nose - BRIDGE OF NOSE


   Wound Type:  pressure ulcer


   Elpidio Test:  Does not Elpidio


   Pressure Ulcer Stage:  Deep Tissue Injury - SUSPECTED


   Wound Thickness:  Full Thickness


   Wound Length:  2.0


   Wound Width:  1.0


   Wound Depth:  UTD


   Percent of Wound Pink/Red:  100 - DEEP RED


   Wound Drainage Amount:  None


   Wound Drainage Odor:  None/Absent


   Tissue Surrounding Wound:  Intact


   Wound General Appearance:  Reddened


Wound Assessment #5:  


   Wound Number:  5


   Wound Present on Admission:  Yes


   New Wound:  No


   Status Change of Wound:  No


   Wound Location Body Site Modif:  left


   Wound Location Body Site:  toe - 5TH LATERAL


   Wound Type:  pressure ulcer


   Elpidio Test:  Does not Elpidio


   Pressure Ulcer Stage:  Deep Tissue Injury


   Wound Thickness:  Full Thickness


   Wound Length:  1.0


   Wound Width:  1.0


   Wound Depth:  UTD


   Percent of Wound Pink/Red:  100 - DEEP RED WIT CALLUS TO SURROUNDING SITE


   Wound Drainage Amount:  None


   Wound Drainage Odor:  None/Absent


   Tissue Surrounding Wound:  Intact


   Wound General Appearance:  Reddened


Wound Assessment #6:  


   Wound Number:  6


   Wound Present on Admission:  Yes


   New Wound:  No


   Status Change of Wound:  No


   Wound Location Body Site Modif:  right


   Wound Location Body Site:  toe - TIP 1ST TOE


   Wound Type:  pressure ulcer


   Elpidio Test:  Does not Elpidio


   Pressure Ulcer Stage:  Deep Tissue Injury


   Wound Thickness:  Full Thickness


   Wound Length:  1.0


   Wound Width:  1.0


   Wound Depth:  UITD


   Percent of Wound Purple/Maroon:  100


   Wound Drainage Amount:  None


   Wound Drainage Odor:  None/Absent


   Tissue Surrounding Wound:  Intact


   Wound General Appearance:  Reddened


Wound Assessment #7:  


   Wound Number:  7


   Wound Present on Admission:  Yes


   New Wound:  No


   Status Change of Wound:  No


   Wound Location Body Site Modif:  left, lateral


   Wound Location Body Site:  malleolus/ankle


   Wound Type:  pressure ulcer


   Elpidio Test:  Does not Elpidio


   Pressure Ulcer Stage:  Deep Tissue Injury - SUSPECTED


   Wound Thickness:  Full Thickness


   Wound Length:  1.0


   Wound Width:  1.0


   Wound Depth:  UTD


   Percent of Wound Pink/Red:  50


   Percent of Wound Black/Brown:  50 - DEEP RED AND TAN/BROWN COLOR TO SITE


   Wound Drainage Amount:  None


   Wound Drainage Odor:  None/Absent


   Tissue Surrounding Wound:  Intact


   Wound General Appearance:  Reddened


Wound Assessment #8:  


   Wound Number:  8


   Wound Present on Admission:  Yes


   New Wound:  No


   Status Change of Wound:  No


   Wound Location Body Site Modif:  left


   Wound Location Body Site:  heel


   Wound Type:  pressure ulcer


   Elpidio Test:  Does not Elpidio


   Pressure Ulcer Stage:  Deep Tissue Injury


   Wound Thickness:  Full Thickness


   Wound Length:  4.0


   Wound Width:  4.0


   Wound Depth:  UTD


   Percent of Wound Black/Brown:  10 - NOTED DRY BROWN INTACT DISCOLORATION AND 

CALLUS


   Percent of Wound Purple/Maroon:  90


   Wound Drainage Amount:  None


   Wound Drainage Odor:  None/Absent


   Tissue Surrounding Wound:  Intact


   Wound General Appearance:  Reddened


Wound Comment


#1 Sacrococcygeal deep tissue injury- at risk for skin breakdown noted small 

area with denuded skin remains as DTI


#2 Left and right top of ear fold suspected deep tissue injury- apply cushion 

to 02 cannula.


#3 Bridge of nose suspected deep tissue injury- provide cushion tape to provide 

protective layer , offload


#4 left 5th toe deep tissue injury.


#5 right tip 1 st toe deep tissue injury


#6 right lateral malleolus suspected deep tissue injury.


#7 left heel deep tissue injury.


#8 scattered skin tears to left and right arms with scattered discolorations





-recommendation


-Local wound care as ordered per protocol


- turn and reposition


- keep clean and dry.


-Optimize nutrition


-apply low air loss mattress


-heel protoectors.


-offload affected areas, heels,feet.


-avoid shear and friction


-assess and follow up with MD if any change of condition to skin is noted.











RENNY WHITT Mar 8, 2018 10:20

## 2018-03-08 NOTE — INFECTIOUS DISEASES PROG NOTE
Assessment/Plan


Problems:  


(1) HCAP (healthcare-associated pneumonia)


Assessment & Plan:  await  sputum culture and continue vancomycin with zosyn 

empiric coverage 





(2) UTI (urinary tract infection)


Assessment & Plan:  will send urine culture, already on zosyn 





(3) Sepsis


Assessment & Plan:  due to the above, continue vancomycin and zosyn pending 

blood culture 





(4) Acute respiratory failure


Assessment & Plan:  due to the above , continue inhalers and high flow oxygen, 

monitor oxygen  





(5) Diabetes mellitus


Assessment & Plan:  recommend tight glycemic control to keep blood glucose 

between 100-140 





(6) Fever


Assessment & Plan:  due to the above, continue wide spectrum antibiotics , and 

Tylenol PRN 








Subjective


ROS Limited/Unobtainable:  Yes


Allergies:  


Coded Allergies:  


     No Known Allergies (Unverified , 3/23/16)


Subjective


he was resting in bed, on high flow oxygen, comfortable, no fever or chills , 

no SOB, coughing phlegm, brownish





Objective


Vital Signs





Last 24 Hour Vital Signs








  Date Time  Temp Pulse Resp B/P (MAP) Pulse Ox O2 Delivery O2 Flow Rate FiO2


 


3/8/18 12:35  105 18  98 Non-Rebreather 15.0 100


 


3/8/18 12:26  103 22  99 Non-Rebreather 15.0 100


 


3/8/18 12:00 98.0 107 20 131/69 100 Non-Rebreather 15.0 





 98.0       


 


3/8/18 12:00  105      


 


3/8/18 09:33  111  125/68    


 


3/8/18 08:00 97.8 111 18 125/68 99 Non-Rebreather 15.0 





 97.8       


 


3/8/18 08:00  111      


 


3/8/18 06:55  106 20  98 Non-Rebreather 15.0 100


 


3/8/18 06:43      Non-Rebreather 15.0 100


 


3/8/18 06:43  105 20  98 Non-Rebreather 15.0 100


 


3/8/18 06:43     98 Non-Rebreather 15.0 100


 


3/8/18 04:00 98.6 102 20 118/71 98 Non-Rebreather 15.0 





 98.6       


 


3/8/18 04:00  102      


 


3/8/18 00:00  103      


 


3/8/18 00:00 98.3 103 21 124/71 98 Non-Rebreather 15.0 





 98.3       


 


3/7/18 21:09  99  108/61    


 


3/7/18 20:00 97.8 106 22 153/97 98 Non-Rebreather 15.0 





 97.8       


 


3/7/18 20:00  106      


 


3/7/18 19:54  102 20  98 Non-Rebreather 15.0 100


 


3/7/18 19:39        100


 


3/7/18 19:39  96 20  98 Non-Rebreather 15.0 100


 


3/7/18 19:39     98 Non-Rebreather 15.0 100


 


3/7/18 19:38      Non-Rebreather 15.0 100


 


3/7/18 18:19      Non-Rebreather 15.0 100


 


3/7/18 16:00  103      


 


3/7/18 16:00 98.1 100 22 104/58 98 Non-Rebreather 15.0 





 98.1       








Height (Feet):  6


Height (Inches):  1.00


Weight (Pounds):  171


General Appearance:  WD/WN, no acute distress


HEENT:  normocephalic, atraumatic, anicteric, mucous membranes moist, PERRL


Respiratory/Chest:  chest wall non-tender, no respiratory distress, no 

accessory muscle use, decreased breath sounds, crackles/rales


Cardiovascular:  normal peripheral pulses, normal rate, regular rhythm, no 

gallop/murmur, no JVD


Abdomen:  normal bowel sounds, soft, non tender, no organomegaly, non distended

, no mass, no scars


Extremities:  no cyanosis, no clubbing


Skin:  no rash, no lesions, ulcers


Neurologic/Psychiatric:  alert, responsive


Lymphatic:  no neck adenopathy, no groin adenopathy





Microbiology








 Date/Time


Source Procedure


Growth Status


 


 


 3/7/18 06:55


Nasal Nares Influenza Types A,B Antigen (LINO) - Final Complete











Laboratory Tests








Test


  3/8/18


12:50


 


C-Reactive Protein,


Quantitative Pending  


 











Current Medications








 Medications


  (Trade)  Dose


 Ordered  Sig/[place]asta


 Route


 PRN Reason  Start Time


 Stop Time Status Last Admin


Dose Admin


 


 Acetaminophen


  (Tylenol)  650 mg  Q6H  PRN


 GT


 Mild Pain/Temp > 100.5  3/7/18 12:15


 4/6/18 12:14   


 


 


 Albuterol Sulfate


  (Proventil)  2.5 mg  TIDRT


 HHN


   3/7/18 13:00


 3/12/18 12:59  3/8/18 12:26


 


 


 Aspirin


  (ASA)  81 mg  DAILY


 GT


   3/8/18 09:00


 4/7/18 08:59  3/8/18 09:33


 


 


 Docusate Sodium


  (Colace)  100 mg  TID


 GT


   3/7/18 13:00


 4/6/18 12:59  3/8/18 09:32


 


 


 Furosemide


  (Lasix)  40 mg  DAILY


 GT


   3/8/18 09:00


 4/7/18 08:59  3/8/18 09:33


 


 


 Insulin Detemir


  (Levemir)  30 units  DAILY


 SUBQ


   3/8/18 09:00


 4/7/18 08:59  3/8/18 09:35


 


 


 Metoclopramide HCl


  (Reglan)  5 mg  EVERY 6  HOURS


 GT


   3/7/18 12:30


 4/6/18 12:29  3/8/18 12:26


 


 


 Metoprolol


 Tartrate


  (Lopressor)  12.5 mg  Q12HR


 ORAL


   3/7/18 21:00


 4/6/18 20:59  3/8/18 09:33


 


 


 Piperacillin Sod/


 Tazobactam Sod


 3.375 gm/Sodium


 Chloride  110 ml @ 


 27.5 mls/hr  EVERY 8  HOURS


 IVPB


   3/7/18 14:00


 3/12/18 13:59  3/8/18 06:40


 


 


 Sitagliptin


 Phosphate


  (Januvia)  50 mg  DAILY


 GT


   3/8/18 09:00


 4/7/18 08:59  3/8/18 09:33


 


 


 Theophylline


  (Theophylline)  80 mg  Q8HR


 GT


   3/7/18 14:00


 4/6/18 13:59  3/8/18 06:40


 


 


 Vancomycin HCl


  (Vanco rx to


 dose)  1 ea  DAILY  PRN


 MISC


 Per rx protocol  3/7/18 12:00


 4/6/18 11:59   


 

















Amira Gross M.D. Mar 8, 2018 14:20

## 2018-03-09 VITALS — SYSTOLIC BLOOD PRESSURE: 134 MMHG | DIASTOLIC BLOOD PRESSURE: 63 MMHG

## 2018-03-09 VITALS — SYSTOLIC BLOOD PRESSURE: 119 MMHG | DIASTOLIC BLOOD PRESSURE: 64 MMHG

## 2018-03-09 VITALS — DIASTOLIC BLOOD PRESSURE: 71 MMHG | SYSTOLIC BLOOD PRESSURE: 139 MMHG

## 2018-03-09 VITALS — SYSTOLIC BLOOD PRESSURE: 142 MMHG | DIASTOLIC BLOOD PRESSURE: 75 MMHG

## 2018-03-09 VITALS — SYSTOLIC BLOOD PRESSURE: 92 MMHG | DIASTOLIC BLOOD PRESSURE: 59 MMHG

## 2018-03-09 VITALS — SYSTOLIC BLOOD PRESSURE: 135 MMHG | DIASTOLIC BLOOD PRESSURE: 74 MMHG

## 2018-03-09 LAB
ADD MANUAL DIFF: NO
ALBUMIN SERPL-MCNC: 1.8 G/DL (ref 3.4–5)
ALBUMIN/GLOB SERPL: 0.4 {RATIO} (ref 1–2.7)
ALP SERPL-CCNC: 80 U/L (ref 46–116)
ALT SERPL-CCNC: 21 U/L (ref 12–78)
ANION GAP SERPL CALC-SCNC: 10 MMOL/L (ref 5–15)
AST SERPL-CCNC: 19 U/L (ref 15–37)
BILIRUB SERPL-MCNC: 0.6 MG/DL (ref 0.2–1)
BUN SERPL-MCNC: 33 MG/DL (ref 7–18)
CALCIUM SERPL-MCNC: 9.9 MG/DL (ref 8.5–10.1)
CHLORIDE SERPL-SCNC: 109 MMOL/L (ref 98–107)
CO2 SERPL-SCNC: 28 MMOL/L (ref 21–32)
CREAT SERPL-MCNC: 1.4 MG/DL (ref 0.55–1.3)
ERYTHROCYTE [DISTWIDTH] IN BLOOD BY AUTOMATED COUNT: 12.4 % (ref 11.6–14.8)
GAMMA GLUTAMYL TRANSPEPTIDASE: 8 U/L (ref 5–85)
GLOBULIN SER-MCNC: 5.1 G/DL
HCT VFR BLD CALC: 37.6 % (ref 42–52)
HGB BLD-MCNC: 12.6 G/DL (ref 14.2–18)
MCV RBC AUTO: 96 FL (ref 80–99)
PHOSPHATE SERPL-MCNC: 2.2 MG/DL (ref 2.5–4.9)
PLATELET # BLD: 171 K/UL (ref 150–450)
POTASSIUM SERPL-SCNC: 3.6 MMOL/L (ref 3.5–5.1)
RBC # BLD AUTO: 3.91 M/UL (ref 4.7–6.1)
SODIUM SERPL-SCNC: 147 MMOL/L (ref 136–145)
WBC # BLD AUTO: 8.6 K/UL (ref 4.8–10.8)

## 2018-03-09 RX ADMIN — ALBUTEROL SULFATE SCH MG: 2.5 SOLUTION RESPIRATORY (INHALATION) at 06:45

## 2018-03-09 RX ADMIN — ASPIRIN 81 MG SCH MG: 81 TABLET ORAL at 08:54

## 2018-03-09 RX ADMIN — DEXTROSE MONOHYDRATE SCH MLS/HR: 50 INJECTION, SOLUTION INTRAVENOUS at 22:28

## 2018-03-09 RX ADMIN — ACETAMINOPHEN PRN MG: 160 SOLUTION ORAL at 04:54

## 2018-03-09 RX ADMIN — METOPROLOL TARTRATE SCH MG: 25 TABLET, FILM COATED ORAL at 20:43

## 2018-03-09 RX ADMIN — LEVALBUTEROL HYDROCHLORIDE SCH MG: 1.25 SOLUTION, CONCENTRATE RESPIRATORY (INHALATION) at 08:01

## 2018-03-09 RX ADMIN — FUROSEMIDE SCH MG: 40 TABLET ORAL at 08:54

## 2018-03-09 RX ADMIN — INSULIN DETEMIR SCH UNITS: 100 INJECTION, SOLUTION SUBCUTANEOUS at 08:55

## 2018-03-09 RX ADMIN — METOCLOPRAMIDE HYDROCHLORIDE SCH MG: 5 SOLUTION ORAL at 12:19

## 2018-03-09 RX ADMIN — DEXTROSE MONOHYDRATE SCH MLS/HR: 50 INJECTION, SOLUTION INTRAVENOUS at 06:12

## 2018-03-09 RX ADMIN — ACETAMINOPHEN PRN MG: 160 SOLUTION ORAL at 20:41

## 2018-03-09 RX ADMIN — DOCUSATE SODIUM SCH MG: 50 LIQUID ORAL at 18:03

## 2018-03-09 RX ADMIN — METOCLOPRAMIDE HYDROCHLORIDE SCH MG: 5 SOLUTION ORAL at 18:03

## 2018-03-09 RX ADMIN — INSULIN DETEMIR SCH UNITS: 100 INJECTION, SOLUTION SUBCUTANEOUS at 20:54

## 2018-03-09 RX ADMIN — DEXTROSE MONOHYDRATE SCH MLS/HR: 50 INJECTION, SOLUTION INTRAVENOUS at 14:00

## 2018-03-09 RX ADMIN — SODIUM CHLORIDE SCH MLS/HR: 9 INJECTION, SOLUTION INTRAVENOUS at 21:11

## 2018-03-09 RX ADMIN — METOPROLOL TARTRATE SCH MG: 25 TABLET, FILM COATED ORAL at 08:55

## 2018-03-09 RX ADMIN — LEVALBUTEROL HYDROCHLORIDE SCH MG: 1.25 SOLUTION, CONCENTRATE RESPIRATORY (INHALATION) at 19:35

## 2018-03-09 RX ADMIN — SITAGLIPTIN SCH MG: 50 TABLET, FILM COATED ORAL at 08:54

## 2018-03-09 RX ADMIN — METOCLOPRAMIDE HYDROCHLORIDE SCH MG: 5 SOLUTION ORAL at 05:01

## 2018-03-09 RX ADMIN — LEVALBUTEROL HYDROCHLORIDE SCH MG: 1.25 SOLUTION, CONCENTRATE RESPIRATORY (INHALATION) at 12:49

## 2018-03-09 RX ADMIN — DOCUSATE SODIUM SCH MG: 50 LIQUID ORAL at 08:54

## 2018-03-09 RX ADMIN — METOCLOPRAMIDE HYDROCHLORIDE SCH MG: 5 SOLUTION ORAL at 00:48

## 2018-03-09 RX ADMIN — DOCUSATE SODIUM SCH MG: 50 LIQUID ORAL at 12:19

## 2018-03-09 NOTE — GENERAL PROGRESS NOTE
Assessment/Plan


Problem List:  


(1) Acute respiratory failure with hypoxia


Assessment & Plan:  Pneumonia


ICD Codes:  J96.01 - Acute respiratory failure with hypoxia


SNOMED:  17235724, 442581508


(2) Dementia


ICD Codes:  F03.90 - Unspecified dementia without behavioral disturbance


SNOMED:  76855564


Qualifiers:  


   Qualified Codes:  F03.90 - Unspecified dementia without behavioral 

disturbance


(3) Functional quadriplegia


ICD Codes:  R53.2 - Functional quadriplegia


SNOMED:  681632207658270


(4) Feeding by G-tube


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


SNOMED:  409834913, 962372515


Status:  stable


Assessment/Plan





no labs today


breathing treatment-


Antibiotics-


per orders





Subjective


ROS Limited/Unobtainable:  No


Constitutional:  Reports: malaise, weakness


Allergies:  


Coded Allergies:  


     No Known Allergies (Unverified , 3/23/16)





Objective





Last 24 Hour Vital Signs








  Date Time  Temp Pulse Resp B/P (MAP) Pulse Ox O2 Delivery O2 Flow Rate FiO2


 


3/9/18 13:05  99 20  96 Venturi Mask 14.0 55


 


3/9/18 12:49  103 18  94 Venturi Mask 14.0 55


 


3/9/18 12:00 97.3 79 22 135/74 95 Venturi Mask 14.0 55





 97.3       


 


3/9/18 12:00  120      


 


3/9/18 08:55  105  92/59    


 


3/9/18 08:11  105 22  94 Venturi Mask 14.0 55


 


3/9/18 08:02  110 20  97 Non-Rebreather 15.0 100


 


3/9/18 08:00  116      


 


3/9/18 08:00 97.9 99 22 92/59 100 Non-Rebreather 15.0 





 97.9       


 


3/9/18 06:48  115 20  98 Non-Rebreather 15.0 100


 


3/9/18 06:45  113 20  98 Non-Rebreather 15.0 100


 


3/9/18 06:45      Non-Rebreather 15.0 100


 


3/9/18 06:45     98 Non-Rebreather 15.0 100


 


3/9/18 05:24 99.5       


 


3/9/18 04:54 99.5       


 


3/9/18 04:19  149      


 


3/9/18 04:00 99.5 134 28 139/71 96 Non-Rebreather 15.0 





 99.5       


 


3/9/18 03:31  115      


 


3/9/18 00:00  114      


 


3/9/18 00:00 99.5 110 24 134/63 96 Non-Rebreather 15.0 





 99.5       


 


3/8/18 21:32  114  116/60    


 


3/8/18 20:00 99.0 114 24 116/60 96 Non-Rebreather 15.0 





 99.0       


 


3/8/18 20:00  113      


 


3/8/18 19:34  109 18  97 Non-Rebreather 15.0 100


 


3/8/18 19:25     96 Non-Rebreather 15.0 100


 


3/8/18 19:25      Non-Rebreather 15.0 100


 


3/8/18 19:24  100 22  95 Non-Rebreather 15.0 100


 


3/8/18 16:00  115      


 


3/8/18 16:00 99.0 89 18 145/99 100 Non-Rebreather 15.0 





 99.0       

















Intake and Output  


 


 3/8/18 3/9/18





 19:00 07:00


 


Intake Total 900.0 ml 1025.000 ml


 


Output Total 1000 ml 800 ml


 


Balance -100.0 ml 225.000 ml


 


  


 


Free Water  100 ml


 


IV Total 220.0 ml 385.000 ml


 


Tube Feeding 480 ml 440 ml


 


Other 200 ml 100 ml


 


Output Urine Total 1000 ml 800 ml


 


# Bowel Movements 3 4








Laboratory Tests


3/8/18 18:10: Random Vancomycin Level 11.0


3/9/18 04:50: 


White Blood Count 8.6, Red Blood Count 3.91L, Hemoglobin 12.6L, Hematocrit 37.6L

, Mean Corpuscular Volume 96, Mean Corpuscular Hemoglobin 32.2H, Mean 

Corpuscular Hemoglobin Concent 33.5, Red Cell Distribution Width 12.4, Platelet 

Count 171, Mean Platelet Volume 7.9, Neutrophils (%) (Auto) , Lymphocytes (%) (

Auto) , Monocytes (%) (Auto) , Eosinophils (%) (Auto) , Basophils (%) (Auto) , 

Sodium Level 147H, Potassium Level 3.6, Chloride Level 109H, Carbon Dioxide 

Level 28, Anion Gap 10, Blood Urea Nitrogen 33H, Creatinine 1.4H, Estimat 

Glomerular Filtration Rate , Glucose Level 251H, Hemoglobin A1c 8.1H, Uric Acid 

3.9, Calcium Level 9.9, Phosphorus Level 2.2L, Magnesium Level 2.3, Total 

Bilirubin 0.6, Gamma Glutamyl Transpeptidase 8, Aspartate Amino Transf (AST/SGOT

) 19, Alanine Aminotransferase (ALT/SGPT) 21, Alkaline Phosphatase 80, Troponin 

I 0.000, Pro-B-Type Natriuretic Peptide 1378H, Total Protein 6.9, Albumin 1.8L, 

Globulin 5.1, Albumin/Globulin Ratio 0.4L


Height (Feet):  6


Height (Inches):  1.00


Weight (Pounds):  171


General Appearance:  no apparent distress


Cardiovascular:  tachycardia


Respiratory/Chest:  decreased breath sounds


Abdomen:  distended











NAY CALDWELL Mar 9, 2018 14:48

## 2018-03-09 NOTE — DISCHARGE SUMMARY 2 SIG
DATE OF ADMISSION:  02/27/2018



DATE OF DISCHARGE:  03/05/2018



REASON FOR ADMISSION:  88-year-old male with history of

diabetes, hypertension, CVA, dementia, and recent sepsis was sent from the

skilled nursing facility for evaluation of altered mental status.  

Upon evaluation, he was found to be hypoxic,  requiring supplemental 

oxygen via non-rebreathing mask,  and tachycardic.  Chest x-ray 

showed probable right-sided infiltrate and poor respiratory effort. 

Patient with  DNR/DNI status.  EKG revealed sinus tachycardia.  

No acute ischemic changes.  Troponin was negative.  Lactic acid -4, 

repeated -1.0. Potassium- 3.2.  Urinalysis was with no evidence of urinary tract

infection. The patient was admitted for further management. 



ADMITTING DIAGNOSES:  



1. Acute hypoxemic respiratory failure.

2. Sepsis.

3. Healthcare-associated pneumonia.

4. Dysphagia.

5. Gastrostomy tube.

6. Diabetes.

7. Functional quadriplegia.

8. Dementia.



HOSPITAL COURSE:  The patient was admitted.  The patient was 

started on IV antibiotics.  ID and Pulmonary consults were requested.  

Sputum culture was negative.  Urine culture was negative.  Blood culture 

were negative.  Influenza screen was negative.  Supplemental oxygen 

titrated to keep pulse oximetry above 92%.  Pulmonary toilet via 

handheld nebulizing with bronchodilators provided.  The patient received 

chest physical therapy along with handheld  nebulizing treatment to help 

with congestion. Antitussive provided as needed.  The trial of theophylline 

was started.  Follow up  CXR revealed  increase in congestion. Treatment 

was intensified and continued. Final CXR revealed improvement in congestion. 

The patient was on diuretics.  Cardiorenal parameters and volumes were 

closely monitored. Echocardiogram revealed preserved ejection fraction 

of 60% to 65% and evidence of aortic sclerosis.  Electrolytes were 

corrected as needed.  DVT and GI prophylaxis provided.

Wound care provided as per wound nurse recommendation for sacral coccyx

deep tissue injury present on admission and left heel deep tissue injury

present on admission.  Blood sugar was managed with current regimen, 

including Januvia.  Strict aspiration precautions were maintained.  G-tube

site care provided.  The patient was able to tolerate tube feeding.  The

patient was stable for discharge.



FINAL DIAGNOSES:

1. Acute hypoxemic respiratory failure.

2. Healthcare-associated pneumonia.

3. Sepsis.

4. Dysphagia, gastrostomy tube.

5. Dementia

6. Diabetes mellitus.

7. Functional quadriplegia.

8. Sacral coccyx deep tissue injury, present on admission.

9. Left heel deep tissue injury pressure ulcer, present on admission.

 



DISCHARGE MEDICATIONS:  See medication reconciliation list.  Complete

antibiotics as recommended by Infectious Disease specialist.



DISCHARGE INSTRUCTIONS:  The patient was discharged to skilled nursing

facility.  Follow up with medical doctor at the facility.







  ______________________________________________

  Nish Schilling M.D.



 



  ______________________________________________

  Mary PiersonFl[facility] Hospital Medical CenterNAZARIO Richardson





DR:  CEFERINO

D:  03/08/2018 11:22

T:  03/09/2018 00:15

JOB#:  [id number]

CC:



KARRI

## 2018-03-09 NOTE — PULMONOLOGY PROGRESS NOTE
Assessment/Plan


Problems:  


(1) Acute respiratory failure


(2) HCAP (healthcare-associated pneumonia)


(3) Functional quadriplegia


(4) Diabetes mellitus


(5) Feeding by G-tube


Assessment/Plan


cxr reviewed, RLL atelectasis


continue abc


chest PT


incentive spirometry


continue abx


tolerating feeding


d/w DPOA at the bed site/





Subjective


ROS Limited/Unobtainable:  Yes


Allergies:  


Coded Allergies:  


     No Known Allergies (Unverified , 3/23/16)





Objective





Last 24 Hour Vital Signs








  Date Time  Temp Pulse Resp B/P (MAP) Pulse Ox O2 Delivery O2 Flow Rate FiO2


 


3/9/18 08:55  105  92/59    


 


3/9/18 08:11  105 22  94 Venturi Mask 14.0 55


 


3/9/18 08:02  110 20  97 Non-Rebreather 15.0 100


 


3/9/18 08:00  116      


 


3/9/18 08:00 97.9 99 22 92/59 100 Non-Rebreather 15.0 





 97.9       


 


3/9/18 06:48  115 20  98 Non-Rebreather 15.0 100


 


3/9/18 06:45  113 20  98 Non-Rebreather 15.0 100


 


3/9/18 06:45      Non-Rebreather 15.0 100


 


3/9/18 06:45     98 Non-Rebreather 15.0 100


 


3/9/18 05:24 99.5       


 


3/9/18 04:54 99.5       


 


3/9/18 04:19  149      


 


3/9/18 04:00 99.5 134 28 139/71 96 Non-Rebreather 15.0 





 99.5       


 


3/9/18 03:31  115      


 


3/9/18 00:00  114      


 


3/9/18 00:00 99.5 110 24 134/63 96 Non-Rebreather 15.0 





 99.5       


 


3/8/18 21:32  114  116/60    


 


3/8/18 20:00 99.0 114 24 116/60 96 Non-Rebreather 15.0 





 99.0       


 


3/8/18 20:00  113      


 


3/8/18 19:34  109 18  97 Non-Rebreather 15.0 100


 


3/8/18 19:25     96 Non-Rebreather 15.0 100


 


3/8/18 19:25      Non-Rebreather 15.0 100


 


3/8/18 19:24  100 22  95 Non-Rebreather 15.0 100


 


3/8/18 16:00  115      


 


3/8/18 16:00 99.0 89 18 145/99 100 Non-Rebreather 15.0 





 99.0       


 


3/8/18 12:35  105 18  98 Non-Rebreather 15.0 100


 


3/8/18 12:26  103 22  99 Non-Rebreather 15.0 100


 


3/8/18 12:00 98.0 107 20 131/69 100 Non-Rebreather 15.0 





 98.0       


 


3/8/18 12:00  105      

















Intake and Output  


 


 3/8/18 3/9/18





 19:00 07:00


 


Intake Total 900.0 ml 1025.000 ml


 


Output Total 1000 ml 800 ml


 


Balance -100.0 ml 225.000 ml


 


  


 


Free Water  100 ml


 


IV Total 220.0 ml 385.000 ml


 


Tube Feeding 480 ml 440 ml


 


Other 200 ml 100 ml


 


Output Urine Total 1000 ml 800 ml


 


# Bowel Movements 3 4








HEENT:  normocephalic, atraumatic


Respiratory/Chest:  chest wall non-tender, lungs clear


Cardiovascular:  normal peripheral pulses, normal rate


Abdomen:  normal bowel sounds, soft, non tender


Extremities:  no clubbing


Skin:  no rash





Microbiology








 Date/Time


Source Procedure


Growth Status


 


 


 3/7/18 06:50


Blood Blood Culture - Preliminary


NO GROWTH AFTER 24 HOURS Resulted


 


 3/7/18 06:35


Blood Blood Culture - Preliminary


NO GROWTH AFTER 24 HOURS Resulted





 3/8/18 08:00


Sputum Expectorated Gram Stain


Pending Resulted


 


 3/8/18 08:00 Sputum Culture - Preliminary


Gram Negative Bacillus 1 Resulted


 


 3/7/18 06:55


Nasal Nares Influenza Types A,B Antigen (LINO) - Final Complete








Laboratory Tests


3/8/18 12:50: C-Reactive Protein, Quantitative 35.0H


3/8/18 18:10: Random Vancomycin Level 11.0


3/9/18 04:50: 


White Blood Count 8.6, Red Blood Count 3.91L, Hemoglobin 12.6L, Hematocrit 37.6L

, Mean Corpuscular Volume 96, Mean Corpuscular Hemoglobin 32.2H, Mean 

Corpuscular Hemoglobin Concent 33.5, Red Cell Distribution Width 12.4, Platelet 

Count 171, Mean Platelet Volume 7.9, Neutrophils (%) (Auto) , Lymphocytes (%) (

Auto) , Monocytes (%) (Auto) , Eosinophils (%) (Auto) , Basophils (%) (Auto) , 

Sodium Level 147H, Potassium Level 3.6, Chloride Level 109H, Carbon Dioxide 

Level 28, Anion Gap 10, Blood Urea Nitrogen 33H, Creatinine 1.4H, Estimat 

Glomerular Filtration Rate , Glucose Level 251H, Hemoglobin A1c 8.1H, Uric Acid 

3.9, Calcium Level 9.9, Phosphorus Level 2.2L, Magnesium Level 2.3, Total 

Bilirubin 0.6, Gamma Glutamyl Transpeptidase 8, Aspartate Amino Transf (AST/SGOT

) 19, Alanine Aminotransferase (ALT/SGPT) 21, Alkaline Phosphatase 80, Troponin 

I 0.000, Pro-B-Type Natriuretic Peptide 1378H, Total Protein 6.9, Albumin 1.8L, 

Globulin 5.1, Albumin/Globulin Ratio 0.4L





Current Medications








 Medications


  (Trade)  Dose


 Ordered  Sig/Shasta


 Route


 PRN Reason  Start Time


 Stop Time Status Last Admin


Dose Admin


 


 Acetaminophen


  (Tylenol)  650 mg  Q6H  PRN


 GT


 Mild Pain/Temp > 100.5  3/7/18 12:15


 4/6/18 12:14  3/9/18 04:54


 


 


 Aspirin


  (ASA)  81 mg  DAILY


 GT


   3/8/18 09:00


 4/7/18 08:59  3/9/18 08:54


 


 


 Docusate Sodium


  (Colace)  100 mg  TID


 GT


   3/7/18 13:00


 4/6/18 12:59  3/9/18 08:54


 


 


 Furosemide


  (Lasix)  40 mg  DAILY


 GT


   3/8/18 09:00


 4/7/18 08:59  3/9/18 08:54


 


 


 Insulin Detemir


  (Levemir)  30 units  DAILY


 SUBQ


   3/8/18 09:00


 4/7/18 08:59  3/9/18 08:55


 


 


 Levalbuterol HCl


  (Xopenex)  1.25 mg  TIDRT


 HHN


   3/9/18 08:00


 3/14/18 07:59  3/9/18 08:01


 


 


 Metoclopramide HCl


  (Reglan)  5 mg  EVERY 6  HOURS


 GT


   3/7/18 12:30


 4/6/18 12:29  3/9/18 05:01


 


 


 Metoprolol


 Tartrate


  (Lopressor)  12.5 mg  Q12HR


 ORAL


   3/7/18 21:00


 4/6/18 20:59  3/8/18 21:32


 


 


 Piperacillin Sod/


 Tazobactam Sod


 3.375 gm/Sodium


 Chloride  110 ml @ 


 27.5 mls/hr  EVERY 8  HOURS


 IVPB


   3/7/18 14:00


 3/12/18 13:59  3/9/18 06:12


 


 


 Sitagliptin


 Phosphate


  (Januvia)  50 mg  DAILY


 GT


   3/8/18 09:00


 4/7/18 08:59  3/9/18 08:54


 


 


 Theophylline


  (Theophylline)  80 mg  Q8HR


 GT


   3/7/18 14:00


 4/6/18 13:59  3/8/18 21:32


 


 


 Vancomycin HCl


  (Vanco rx to


 dose)  1 ea  DAILY  PRN


 MISC


 Per rx protocol  3/7/18 12:00


 4/6/18 11:59   


 


 


 Vancomycin HCl 1


 gm/Dextrose  275 ml @ 


 183.708


 mls/hr  Q24H


 IVPB


   3/8/18 20:00


 3/9/18 21:30  3/8/18 21:00


 

















JOAN DEMARCO Mar 9, 2018 10:58

## 2018-03-09 NOTE — DIAGNOSTIC IMAGING REPORT
Indication: Dyspnea

 

Technique: One view of the chest

 

Comparison: 3/7/2018

 

Findings: There is increased consolidation of the right lung base, now with air

bronchograms. There is increased interstitial opacity throughout the left lung, as

well as patchy opacities in the right upper lobe which are new or increased. There

may be a small amount of pleural fluid on the right. The heart size is normal

 

Impression: Increased right basilar consolidation, over 2 days, consistent with

evolving pneumonia

 

Increased left lung interstitial disease

 

Other findings as noted

## 2018-03-10 VITALS — DIASTOLIC BLOOD PRESSURE: 54 MMHG | SYSTOLIC BLOOD PRESSURE: 100 MMHG

## 2018-03-10 VITALS — DIASTOLIC BLOOD PRESSURE: 52 MMHG | SYSTOLIC BLOOD PRESSURE: 103 MMHG

## 2018-03-10 VITALS — SYSTOLIC BLOOD PRESSURE: 129 MMHG | DIASTOLIC BLOOD PRESSURE: 87 MMHG

## 2018-03-10 VITALS — SYSTOLIC BLOOD PRESSURE: 111 MMHG | DIASTOLIC BLOOD PRESSURE: 67 MMHG

## 2018-03-10 VITALS — DIASTOLIC BLOOD PRESSURE: 38 MMHG | SYSTOLIC BLOOD PRESSURE: 118 MMHG

## 2018-03-10 VITALS — SYSTOLIC BLOOD PRESSURE: 110 MMHG | DIASTOLIC BLOOD PRESSURE: 54 MMHG

## 2018-03-10 VITALS — SYSTOLIC BLOOD PRESSURE: 135 MMHG | DIASTOLIC BLOOD PRESSURE: 85 MMHG

## 2018-03-10 VITALS — SYSTOLIC BLOOD PRESSURE: 148 MMHG | DIASTOLIC BLOOD PRESSURE: 86 MMHG

## 2018-03-10 VITALS — DIASTOLIC BLOOD PRESSURE: 60 MMHG | SYSTOLIC BLOOD PRESSURE: 100 MMHG

## 2018-03-10 VITALS — DIASTOLIC BLOOD PRESSURE: 75 MMHG | SYSTOLIC BLOOD PRESSURE: 136 MMHG

## 2018-03-10 VITALS — DIASTOLIC BLOOD PRESSURE: 58 MMHG | SYSTOLIC BLOOD PRESSURE: 112 MMHG

## 2018-03-10 VITALS — DIASTOLIC BLOOD PRESSURE: 55 MMHG | SYSTOLIC BLOOD PRESSURE: 112 MMHG

## 2018-03-10 VITALS — SYSTOLIC BLOOD PRESSURE: 112 MMHG | DIASTOLIC BLOOD PRESSURE: 56 MMHG

## 2018-03-10 VITALS — DIASTOLIC BLOOD PRESSURE: 54 MMHG | SYSTOLIC BLOOD PRESSURE: 90 MMHG

## 2018-03-10 VITALS — DIASTOLIC BLOOD PRESSURE: 54 MMHG | SYSTOLIC BLOOD PRESSURE: 98 MMHG

## 2018-03-10 VITALS — DIASTOLIC BLOOD PRESSURE: 87 MMHG | SYSTOLIC BLOOD PRESSURE: 129 MMHG

## 2018-03-10 VITALS — SYSTOLIC BLOOD PRESSURE: 105 MMHG | DIASTOLIC BLOOD PRESSURE: 54 MMHG

## 2018-03-10 VITALS — DIASTOLIC BLOOD PRESSURE: 57 MMHG | SYSTOLIC BLOOD PRESSURE: 126 MMHG

## 2018-03-10 VITALS — SYSTOLIC BLOOD PRESSURE: 102 MMHG | DIASTOLIC BLOOD PRESSURE: 53 MMHG

## 2018-03-10 VITALS — SYSTOLIC BLOOD PRESSURE: 123 MMHG | DIASTOLIC BLOOD PRESSURE: 64 MMHG

## 2018-03-10 VITALS — DIASTOLIC BLOOD PRESSURE: 47 MMHG | SYSTOLIC BLOOD PRESSURE: 102 MMHG

## 2018-03-10 VITALS — DIASTOLIC BLOOD PRESSURE: 60 MMHG | SYSTOLIC BLOOD PRESSURE: 110 MMHG

## 2018-03-10 VITALS — SYSTOLIC BLOOD PRESSURE: 109 MMHG | DIASTOLIC BLOOD PRESSURE: 58 MMHG

## 2018-03-10 LAB
ADD MANUAL DIFF: YES
ALBUMIN SERPL-MCNC: 1.8 G/DL (ref 3.4–5)
ALBUMIN/GLOB SERPL: 0.3 {RATIO} (ref 1–2.7)
ALP SERPL-CCNC: 70 U/L (ref 46–116)
ALT SERPL-CCNC: 16 U/L (ref 12–78)
ANION GAP SERPL CALC-SCNC: 5 MMOL/L (ref 5–15)
AST SERPL-CCNC: 16 U/L (ref 15–37)
BILIRUB SERPL-MCNC: 0.6 MG/DL (ref 0.2–1)
BUN SERPL-MCNC: 38 MG/DL (ref 7–18)
CALCIUM SERPL-MCNC: 10 MG/DL (ref 8.5–10.1)
CHLORIDE SERPL-SCNC: 112 MMOL/L (ref 98–107)
CO2 SERPL-SCNC: 32 MMOL/L (ref 21–32)
CREAT SERPL-MCNC: 1.2 MG/DL (ref 0.55–1.3)
ERYTHROCYTE [DISTWIDTH] IN BLOOD BY AUTOMATED COUNT: 12.5 % (ref 11.6–14.8)
GLOBULIN SER-MCNC: 5.3 G/DL
HCT VFR BLD CALC: 39.9 % (ref 42–52)
HGB BLD-MCNC: 13.4 G/DL (ref 14.2–18)
MCV RBC AUTO: 97 FL (ref 80–99)
PHOSPHATE SERPL-MCNC: 3 MG/DL (ref 2.5–4.9)
PLATELET # BLD: 198 K/UL (ref 150–450)
POTASSIUM SERPL-SCNC: 3 MMOL/L (ref 3.5–5.1)
RBC # BLD AUTO: 4.11 M/UL (ref 4.7–6.1)
SODIUM SERPL-SCNC: 149 MMOL/L (ref 136–145)
WBC # BLD AUTO: 9.3 K/UL (ref 4.8–10.8)

## 2018-03-10 RX ADMIN — LEVALBUTEROL HYDROCHLORIDE SCH MG: 1.25 SOLUTION, CONCENTRATE RESPIRATORY (INHALATION) at 19:24

## 2018-03-10 RX ADMIN — DEXTROSE MONOHYDRATE SCH MLS/HR: 50 INJECTION, SOLUTION INTRAVENOUS at 06:04

## 2018-03-10 RX ADMIN — METOCLOPRAMIDE HYDROCHLORIDE SCH MG: 5 SOLUTION ORAL at 00:46

## 2018-03-10 RX ADMIN — LEVALBUTEROL HYDROCHLORIDE SCH MG: 1.25 SOLUTION, CONCENTRATE RESPIRATORY (INHALATION) at 13:16

## 2018-03-10 RX ADMIN — METOCLOPRAMIDE HYDROCHLORIDE SCH MG: 5 SOLUTION ORAL at 23:08

## 2018-03-10 RX ADMIN — METOCLOPRAMIDE HYDROCHLORIDE SCH MG: 5 SOLUTION ORAL at 06:16

## 2018-03-10 RX ADMIN — DOCUSATE SODIUM SCH MG: 50 LIQUID ORAL at 08:33

## 2018-03-10 RX ADMIN — ACETAMINOPHEN PRN MG: 160 SOLUTION ORAL at 12:01

## 2018-03-10 RX ADMIN — INSULIN DETEMIR SCH UNITS: 100 INJECTION, SOLUTION SUBCUTANEOUS at 21:27

## 2018-03-10 RX ADMIN — VANCOMYCIN HCL-SODIUM CHLORIDE IV SOLN 1.25 GM/250ML-0.9% SCH MLS/HR: 1.25-0.9/25 SOLUTION at 22:21

## 2018-03-10 RX ADMIN — LEVALBUTEROL HYDROCHLORIDE SCH MG: 1.25 SOLUTION, CONCENTRATE RESPIRATORY (INHALATION) at 07:26

## 2018-03-10 RX ADMIN — SITAGLIPTIN SCH MG: 50 TABLET, FILM COATED ORAL at 08:33

## 2018-03-10 RX ADMIN — DOCUSATE SODIUM SCH MG: 50 LIQUID ORAL at 18:32

## 2018-03-10 RX ADMIN — INSULIN DETEMIR SCH UNITS: 100 INJECTION, SOLUTION SUBCUTANEOUS at 08:35

## 2018-03-10 RX ADMIN — METOCLOPRAMIDE HYDROCHLORIDE SCH MG: 5 SOLUTION ORAL at 12:00

## 2018-03-10 RX ADMIN — METOCLOPRAMIDE HYDROCHLORIDE SCH MG: 5 SOLUTION ORAL at 18:31

## 2018-03-10 RX ADMIN — DOCUSATE SODIUM SCH MG: 50 LIQUID ORAL at 13:17

## 2018-03-10 RX ADMIN — FUROSEMIDE SCH MG: 40 TABLET ORAL at 08:33

## 2018-03-10 RX ADMIN — METOPROLOL TARTRATE SCH MG: 25 TABLET, FILM COATED ORAL at 08:33

## 2018-03-10 RX ADMIN — ASPIRIN 81 MG SCH MG: 81 TABLET ORAL at 08:33

## 2018-03-10 RX ADMIN — METOPROLOL TARTRATE SCH MG: 25 TABLET, FILM COATED ORAL at 20:51

## 2018-03-10 NOTE — INFECTIOUS DISEASES PROG NOTE
Assessment/Plan


Problems:  


(1) HCAP (healthcare-associated pneumonia)


Assessment & Plan:  sputum culture grew MDR Providencia stuartii , will switch 

zosyn to ertapenem for 10 days and continue vancomycin empiric coverage for 

MRSA to treat his pneumonia . continue  aspiration precaution with suctioning . 

monitor cxr 





(2) UTI (urinary tract infection)


Assessment & Plan:  no urine culture was collected , now on ertapenem for 10 

days which will cover his UTI 





(3) Sepsis


Assessment & Plan:  due to the above, continue vancomycin and ertapenem ,  

blood culture remains negative  





(4) Acute respiratory failure


Assessment & Plan:  due to the above , continue inhalers and high flow oxygen, 

monitor CXR  





(5) Diabetes mellitus


Assessment & Plan:  recommend tight glycemic control to keep blood glucose 

between 100-140 





(6) Fever


Assessment & Plan:  due to the above, improving , continue wide spectrum 

antibiotics , and Tylenol PRN 








Subjective


ROS Limited/Unobtainable:  Yes


Allergies:  


Coded Allergies:  


     No Known Allergies (Unverified , 3/23/16)


Subjective


he was comfortable in bed,  alert, still on high flow oxygen , comfortable, no 

fever or chills , no SOB, coughing dark phlegm, on venturi mask, with harsh 

breathing sounds





Objective


Vital Signs





Last 24 Hour Vital Signs








  Date Time  Temp Pulse Resp B/P (MAP) Pulse Ox O2 Delivery O2 Flow Rate FiO2


 


3/10/18 12:01 100.0       


 


3/10/18 11:24  130      


 


3/10/18 08:33  133  136/75    


 


3/10/18 08:00 98.4 133 20 136/75 100 Venturi Mask 14.0 55





 98.4       


 


3/10/18 08:00  126      


 


3/10/18 07:37  116 20  97 Venturi Mask 14.0 55


 


3/10/18 07:27     93 Venturi Mask 14.0 55


 


3/10/18 07:27      Venturi Mask 14.0 55


 


3/10/18 07:26  115 20  93 Venturi Mask 14.0 55


 


3/10/18 04:00  117      


 


3/10/18 04:00 98.6 119 28 148/86 93 Venturi Mask 14.0 55





 98.6       


 


3/10/18 00:00 98.1 101 24 111/67 94 Venturi Mask 14.0 55





 98.1       


 


3/9/18 23:48  99      


 


3/9/18 21:11 98.2       


 


3/9/18 20:43  125  142/75    


 


3/9/18 20:41 98.2       


 


3/9/18 20:00 99.1 125 20 142/75 92 Venturi Mask 14.0 55





 99.1       


 


3/9/18 19:46  81 20  96 Venturi Mask 14.0 55


 


3/9/18 19:36  109 18  94 Venturi Mask 14.0 55


 


3/9/18 19:36     94 Non-Rebreather 15.0 100


 


3/9/18 19:36      Non-Rebreather 15.0 100


 


3/9/18 19:28  113      


 


3/9/18 16:00  110      


 


3/9/18 16:00 98.2 106 20 119/64 94 Venturi Mask 14.0 55





 98.2       


 


3/9/18 13:05  99 20  96 Venturi Mask 14.0 55








Height (Feet):  6


Height (Inches):  1.00


Weight (Pounds):  171


General Appearance:  WD/WN, no acute distress


HEENT:  normocephalic, atraumatic, anicteric, mucous membranes moist, supple, 

no JVD


Respiratory/Chest:  chest wall non-tender, no respiratory distress, no 

accessory muscle use, decreased breath sounds, crackles/rales, expiratory 

wheezing


Cardiovascular:  normal peripheral pulses, normal rate, regular rhythm, no 

gallop/murmur, no JVD


Abdomen:  normal bowel sounds, soft, non tender, no organomegaly, non distended

, no mass, no scars


Genitourinary:  normal external genitalia


Extremities:  no cyanosis, no clubbing


Skin:  no rash, no lesions


Neurologic/Psychiatric:  alert, unresponsiveness


Musculoskeletal:  normal muscle bulk





Microbiology








 Date/Time


Source Procedure


Growth Status


 


 


 3/8/18 08:00


Sputum Expectorated Gram Stain - Final Complete


 


 3/8/18 08:00 Sputum Culture - Final


Providencia Stuartii


Usual Upper Respiratory Kailee Complete











Laboratory Tests








Test


  3/10/18


03:50


 


White Blood Count


  9.3 K/UL


(4.8-10.8)


 


Red Blood Count


  4.11 M/UL


(4.70-6.10)  L


 


Hemoglobin


  13.4 G/DL


(14.2-18.0)  L


 


Hematocrit


  39.9 %


(42.0-52.0)  L


 


Mean Corpuscular Volume 97 FL (80-99)  


 


Mean Corpuscular Hemoglobin


  32.6 PG


(27.0-31.0)  H


 


Mean Corpuscular Hemoglobin


Concent 33.6 G/DL


(32.0-36.0)


 


Red Cell Distribution Width


  12.5 %


(11.6-14.8)


 


Platelet Count


  198 K/UL


(150-450)


 


Mean Platelet Volume


  8.6 FL


(6.5-10.1)


 


Neutrophils (%) (Auto)


  % (45.0-75.0)


 


 


Lymphocytes (%) (Auto)


  % (20.0-45.0)


 


 


Monocytes (%) (Auto)  % (1.0-10.0)  


 


Eosinophils (%) (Auto)  % (0.0-3.0)  


 


Basophils (%) (Auto)  % (0.0-2.0)  


 


Differential Total Cells


Counted 100  


 


 


Neutrophils % (Manual) 82 % (45-75)  H


 


Lymphocytes % (Manual) 9 % (20-45)  L


 


Monocytes % (Manual) 3 % (1-10)  


 


Eosinophils % (Manual) 0 % (0-3)  


 


Basophils % (Manual) 0 % (0-2)  


 


Band Neutrophils 6 % (0-8)  


 


Platelet Estimate Adequate  


 


Platelet Morphology Normal  


 


Red Blood Cell Morphology Normal  


 


Sodium Level


  149 MMOL/L


(136-145)  H


 


Potassium Level


  3.0 MMOL/L


(3.5-5.1)  L


 


Chloride Level


  112 MMOL/L


()  H


 


Carbon Dioxide Level


  32 MMOL/L


(21-32)


 


Anion Gap


  5 mmol/L


(5-15)


 


Blood Urea Nitrogen


  38 mg/dL


(7-18)  H


 


Creatinine


  1.2 MG/DL


(0.55-1.30)


 


Estimat Glomerular Filtration


Rate  mL/min (>60)  


 


 


Glucose Level


  135 MG/DL


()  #H


 


Lactic Acid Level


  1.80 mmol/L


(0.66-2.22)


 


Uric Acid


  4.8 MG/DL


(2.6-7.2)


 


Calcium Level


  10.0 MG/DL


(8.5-10.1)


 


Phosphorus Level


  3.0 MG/DL


(2.5-4.9)


 


Magnesium Level


  2.1 MG/DL


(1.8-2.4)


 


Total Bilirubin


  0.6 MG/DL


(0.2-1.0)


 


Aspartate Amino Transf


(AST/SGOT) 16 U/L (15-37)


 


 


Alanine Aminotransferase


(ALT/SGPT) 16 U/L (12-78)


 


 


Alkaline Phosphatase


  70 U/L


()


 


Troponin I


  0.000 ng/mL


(0.000-0.056)


 


C-Reactive Protein,


Quantitative > 70.0 mg/dL


(0.00-0.90)  H


 


Total Protein


  7.1 G/DL


(6.4-8.2)


 


Albumin


  1.8 G/DL


(3.4-5.0)  L


 


Globulin 5.3 g/dL  


 


Albumin/Globulin Ratio


  0.3 (1.0-2.7)


L











Current Medications








 Medications


  (Trade)  Dose


 Ordered  Sig/Shasta


 Route


 PRN Reason  Start Time


 Stop Time Status Last Admin


Dose Admin


 


 Acetaminophen


  (Tylenol)  650 mg  Q6H  PRN


 GT


 Mild Pain/Temp > 100.5  3/7/18 12:15


 4/6/18 12:14  3/10/18 12:01


 


 


 Aspirin


  (ASA)  81 mg  DAILY


 GT


   3/8/18 09:00


 4/7/18 08:59  3/10/18 08:33


 


 


 Diltiazem HCl 125


 mg/Dextrose  125 ml @ 0


 mls/hr  Q24H


 IV


   3/10/18 12:45


 3/11/18 12:44   


 


 


 Docusate Sodium


  (Colace)  100 mg  TID


 GT


   3/7/18 13:00


 4/6/18 12:59  3/10/18 08:33


 


 


 Ertapenem 1 gm/


 Sodium Chloride  55 ml @ 


 110 mls/hr  Q24H


 IVPB


   3/10/18 12:30


 3/15/18 12:29   


 


 


 Furosemide


  (Lasix)  40 mg  DAILY


 GT


   3/8/18 09:00


 4/7/18 08:59  3/10/18 08:33


 


 


 Insulin Detemir


  (Levemir)  20 units  Q12HR


 SUBQ


   3/9/18 21:00


 4/7/18 08:59  3/10/18 08:35


 


 


 Levalbuterol HCl


  (Xopenex)  1.25 mg  TIDRT


 HHN


   3/9/18 08:00


 3/14/18 07:59  3/10/18 07:26


 


 


 Metoclopramide HCl


  (Reglan)  5 mg  EVERY 6  HOURS


 GT


   3/7/18 12:30


 4/6/18 12:29  3/10/18 12:00


 


 


 Metoprolol


 Tartrate


  (Lopressor)  25 mg  Q12HR


 ORAL


   3/10/18 21:00


 4/9/18 20:59   


 


 


 Potassium


 Chloride 50 meq/


 Sodium Chloride  575 ml @ 


 115 mls/hr  ONCE  ONCE


 IVPB


   3/10/18 09:00


 3/10/18 13:59  3/10/18 09:25


 


 


 Sitagliptin


 Phosphate


  (Januvia)  50 mg  DAILY


 GT


   3/8/18 09:00


 4/7/18 08:59  3/10/18 08:33


 


 


 Theophylline


  (Theophylline)  80 mg  Q8HR


 GT


   3/7/18 14:00


 4/6/18 13:59  3/10/18 06:16


 


 


 Vancomycin HCl


  (Vanco rx to


 dose)  1 ea  DAILY  PRN


 MISC


 Per rx protocol  3/7/18 12:00


 4/6/18 11:59   


 

















Amira Gross M.D. Mar 10, 2018 13:07

## 2018-03-10 NOTE — GENERAL PROGRESS NOTE
Assessment/Plan


Problem List:  


(1) Acute respiratory failure with hypoxia


Assessment & Plan:  Pneumonia


ICD Codes:  J96.01 - Acute respiratory failure with hypoxia


SNOMED:  61937950, 651142242


(2) Dementia


ICD Codes:  F03.90 - Unspecified dementia without behavioral disturbance


SNOMED:  78795236


Qualifiers:  


   Qualified Codes:  F03.90 - Unspecified dementia without behavioral 

disturbance


(3) Functional quadriplegia


ICD Codes:  R53.2 - Functional quadriplegia


SNOMED:  165654882625538


(4) Feeding by G-tube


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


SNOMED:  715034264, 977342482


Assessment/Plan


increase lopressor


K supplement


increase feeding


breathing treatment-


Antibiotics-


per orders





Subjective


ROS Limited/Unobtainable:  No


Constitutional:  Reports: malaise


Allergies:  


Coded Allergies:  


     No Known Allergies (Unverified , 3/23/16)





Objective





Last 24 Hour Vital Signs








  Date Time  Temp Pulse Resp B/P (MAP) Pulse Ox O2 Delivery O2 Flow Rate FiO2


 


3/10/18 07:27     93 Venturi Mask 14.0 55


 


3/10/18 07:27      Venturi Mask 14.0 55


 


3/10/18 07:26  115 20  93 Venturi Mask 14.0 55


 


3/10/18 04:00  117      


 


3/10/18 04:00 98.6 119 28 148/86 93 Venturi Mask 14.0 55





 98.6       


 


3/10/18 00:00 98.1 101 24 111/67 94 Venturi Mask 14.0 55





 98.1       


 


3/9/18 23:48  99      


 


3/9/18 21:11 98.2       


 


3/9/18 20:43  125  142/75    


 


3/9/18 20:41 98.2       


 


3/9/18 20:00 99.1 125 20 142/75 92 Venturi Mask 14.0 55





 99.1       


 


3/9/18 19:46  81 20  96 Venturi Mask 14.0 55


 


3/9/18 19:36  109 18  94 Venturi Mask 14.0 55


 


3/9/18 19:36     94 Non-Rebreather 15.0 100


 


3/9/18 19:36      Non-Rebreather 15.0 100


 


3/9/18 19:28  113      


 


3/9/18 16:00  110      


 


3/9/18 16:00 98.2 106 20 119/64 94 Venturi Mask 14.0 55





 98.2       


 


3/9/18 13:05  99 20  96 Venturi Mask 14.0 55


 


3/9/18 12:49  103 18  94 Venturi Mask 14.0 55


 


3/9/18 12:00 97.3 79 22 135/74 95 Venturi Mask 14.0 55





 97.3       


 


3/9/18 12:00  120      


 


3/9/18 08:55  105  92/59    


 


3/9/18 08:11  105 22  94 Venturi Mask 14.0 55


 


3/9/18 08:02  110 20  97 Non-Rebreather 15.0 100


 


3/9/18 08:00  116      


 


3/9/18 08:00 97.9 99 22 92/59 100 Non-Rebreather 15.0 





 97.9       

















Intake and Output  


 


 3/9/18 3/10/18





 19:00 07:00


 


Intake Total 840.0 ml 550.0 ml


 


Output Total 550 ml 


 


Balance 290.0 ml 550.0 ml


 


  


 


IV Total 220.0 ml 110.0 ml


 


Tube Feeding 480 ml 400 ml


 


Other 140 ml 40 ml


 


Output Urine Total 550 ml 


 


# Voids 1 


 


# Bowel Movements 1 








Laboratory Tests


3/10/18 03:50: 


White Blood Count 9.3, Red Blood Count 4.11L, Hemoglobin 13.4L, Hematocrit 39.9L

, Mean Corpuscular Volume 97, Mean Corpuscular Hemoglobin 32.6H, Mean 

Corpuscular Hemoglobin Concent 33.6, Red Cell Distribution Width 12.5, Platelet 

Count 198, Mean Platelet Volume 8.6, Neutrophils (%) (Auto) , Lymphocytes (%) (

Auto) , Monocytes (%) (Auto) , Eosinophils (%) (Auto) , Basophils (%) (Auto) , 

Neutrophils % (Manual) [Pending], Lymphocytes % (Manual) [Pending], Platelet 

Estimate [Pending], Platelet Morphology [Pending], Sodium Level 149H, Potassium 

Level 3.0L, Chloride Level 112H, Carbon Dioxide Level 32, Anion Gap 5, Blood 

Urea Nitrogen 38H, Creatinine 1.2, Estimat Glomerular Filtration Rate , Glucose 

Level 135#H, Lactic Acid Level 1.80, Uric Acid 4.8, Calcium Level 10.0, 

Phosphorus Level 3.0, Magnesium Level 2.1, Total Bilirubin 0.6, Aspartate Amino 

Transf (AST/SGOT) 16, Alanine Aminotransferase (ALT/SGPT) 16, Alkaline 

Phosphatase 70, Troponin I 0.000, Total Protein 7.1, Albumin 1.8L, Globulin 5.3

, Albumin/Globulin Ratio 0.3L


Height (Feet):  6


Height (Inches):  1.00


Weight (Pounds):  171


General Appearance:  no apparent distress


Cardiovascular:  tachycardia


Respiratory/Chest:  decreased breath sounds


Abdomen:  soft











NAY CALDWELL Mar 10, 2018 07:31

## 2018-03-10 NOTE — CARDIAC ELECTROPHYSIOLOGY PN
Subjective


Subjective


[id number]





Objective





Last 24 Hour Vital Signs








  Date Time  Temp Pulse Resp B/P (MAP) Pulse Ox O2 Delivery O2 Flow Rate FiO2


 


3/10/18 15:22  138  109/58    


 


3/10/18 13:34  123 20  96 Non-Rebreather 15.0 100


 


3/10/18 13:16  122 20  92 Venturi Mask 14.0 55


 


3/10/18 12:31 99.4       


 


3/10/18 12:01 100.0       


 


3/10/18 12:00 100.0 121 30 135/85 88 Venturi Mask 14.0 





 100.0       


 


3/10/18 12:00  169      


 


3/10/18 11:24  130      


 


3/10/18 08:33  133  136/75    


 


3/10/18 08:00 98.4 133 20 136/75 100 Venturi Mask 14.0 55





 98.4       


 


3/10/18 08:00  126      


 


3/10/18 07:37  116 20  97 Venturi Mask 14.0 55


 


3/10/18 07:27     93 Venturi Mask 14.0 55


 


3/10/18 07:27      Venturi Mask 14.0 55


 


3/10/18 07:26  115 20  93 Venturi Mask 14.0 55


 


3/10/18 04:00  117      


 


3/10/18 04:00 98.6 119 28 148/86 93 Venturi Mask 14.0 55





 98.6       


 


3/10/18 00:00 98.1 101 24 111/67 94 Venturi Mask 14.0 55





 98.1       


 


3/9/18 23:48  99      


 


3/9/18 20:43  125  142/75    


 


3/9/18 20:41 98.2       


 


3/9/18 20:00 99.1 125 20 142/75 92 Venturi Mask 14.0 55





 99.1       


 


3/9/18 19:46  81 20  96 Venturi Mask 14.0 55


 


3/9/18 19:36  109 18  94 Venturi Mask 14.0 55


 


3/9/18 19:36     94 Non-Rebreather 15.0 100


 


3/9/18 19:36      Non-Rebreather 15.0 100


 


3/9/18 19:28  113      


 


3/9/18 16:00  110      


 


3/9/18 16:00 98.2 106 20 119/64 94 Venturi Mask 14.0 55





 98.2       

















Intake and Output  


 


 3/9/18 3/10/18





 19:00 07:00


 


Intake Total 840.0 ml 590.0 ml


 


Output Total 550 ml 


 


Balance 290.0 ml 590.0 ml


 


  


 


IV Total 220.0 ml 110.0 ml


 


Tube Feeding 480 ml 440 ml


 


Other 140 ml 40 ml


 


Output Urine Total 550 ml 


 


# Voids 1 


 


# Bowel Movements 1 











Laboratory Tests








Test


  3/10/18


03:50 3/10/18


13:00


 


White Blood Count


  9.3 K/UL


(4.8-10.8) 


 


 


Red Blood Count


  4.11 M/UL


(4.70-6.10)  L 


 


 


Hemoglobin


  13.4 G/DL


(14.2-18.0)  L 


 


 


Hematocrit


  39.9 %


(42.0-52.0)  L 


 


 


Mean Corpuscular Volume 97 FL (80-99)   


 


Mean Corpuscular Hemoglobin


  32.6 PG


(27.0-31.0)  H 


 


 


Mean Corpuscular Hemoglobin


Concent 33.6 G/DL


(32.0-36.0) 


 


 


Red Cell Distribution Width


  12.5 %


(11.6-14.8) 


 


 


Platelet Count


  198 K/UL


(150-450) 


 


 


Mean Platelet Volume


  8.6 FL


(6.5-10.1) 


 


 


Neutrophils (%) (Auto)


  % (45.0-75.0)


  


 


 


Lymphocytes (%) (Auto)


  % (20.0-45.0)


  


 


 


Monocytes (%) (Auto)  % (1.0-10.0)   


 


Eosinophils (%) (Auto)  % (0.0-3.0)   


 


Basophils (%) (Auto)  % (0.0-2.0)   


 


Differential Total Cells


Counted 100  


  


 


 


Neutrophils % (Manual) 82 % (45-75)  H 


 


Lymphocytes % (Manual) 9 % (20-45)  L 


 


Monocytes % (Manual) 3 % (1-10)   


 


Eosinophils % (Manual) 0 % (0-3)   


 


Basophils % (Manual) 0 % (0-2)   


 


Band Neutrophils 6 % (0-8)   


 


Platelet Estimate Adequate   


 


Platelet Morphology Normal   


 


Red Blood Cell Morphology Normal   


 


Sodium Level


  149 MMOL/L


(136-145)  H 


 


 


Potassium Level


  3.0 MMOL/L


(3.5-5.1)  L 


 


 


Chloride Level


  112 MMOL/L


()  H 


 


 


Carbon Dioxide Level


  32 MMOL/L


(21-32) 


 


 


Anion Gap


  5 mmol/L


(5-15) 


 


 


Blood Urea Nitrogen


  38 mg/dL


(7-18)  H 


 


 


Creatinine


  1.2 MG/DL


(0.55-1.30) 


 


 


Estimat Glomerular Filtration


Rate  mL/min (>60)  


  


 


 


Glucose Level


  135 MG/DL


()  #H 


 


 


Lactic Acid Level


  1.80 mmol/L


(0.66-2.22) 


 


 


Uric Acid


  4.8 MG/DL


(2.6-7.2) 


 


 


Calcium Level


  10.0 MG/DL


(8.5-10.1) 


 


 


Phosphorus Level


  3.0 MG/DL


(2.5-4.9) 


 


 


Magnesium Level


  2.1 MG/DL


(1.8-2.4) 


 


 


Total Bilirubin


  0.6 MG/DL


(0.2-1.0) 


 


 


Aspartate Amino Transf


(AST/SGOT) 16 U/L (15-37)


  


 


 


Alanine Aminotransferase


(ALT/SGPT) 16 U/L (12-78)


  


 


 


Alkaline Phosphatase


  70 U/L


() 


 


 


Troponin I


  0.000 ng/mL


(0.000-0.056) 0.000 ng/mL


(0.000-0.056)


 


C-Reactive Protein,


Quantitative > 70.0 mg/dL


(0.00-0.90)  H 


 


 


Total Protein


  7.1 G/DL


(6.4-8.2) 


 


 


Albumin


  1.8 G/DL


(3.4-5.0)  L 


 


 


Globulin 5.3 g/dL   


 


Albumin/Globulin Ratio


  0.3 (1.0-2.7)


L 


 


 


Thyroid Stimulating Hormone


(TSH) 


  0.708 uiU/mL


(0.358-3.740)


 


Free Thyroxine


  


  1.10 NG/DL


(0.76-1.46)











Microbiology








 Date/Time


Source Procedure


Growth Status


 


 


 3/8/18 08:00


Sputum Expectorated Gram Stain - Final Complete


 


 3/8/18 08:00 Sputum Culture - Final


Providencia Stuartii


Usual Upper Respiratory Kailee Complete

















LAI REA Mar 10, 2018 15:28

## 2018-03-11 VITALS — DIASTOLIC BLOOD PRESSURE: 44 MMHG | SYSTOLIC BLOOD PRESSURE: 115 MMHG

## 2018-03-11 VITALS — SYSTOLIC BLOOD PRESSURE: 114 MMHG | DIASTOLIC BLOOD PRESSURE: 47 MMHG

## 2018-03-11 VITALS — DIASTOLIC BLOOD PRESSURE: 50 MMHG | SYSTOLIC BLOOD PRESSURE: 105 MMHG

## 2018-03-11 VITALS — SYSTOLIC BLOOD PRESSURE: 121 MMHG | DIASTOLIC BLOOD PRESSURE: 51 MMHG

## 2018-03-11 VITALS — SYSTOLIC BLOOD PRESSURE: 100 MMHG | DIASTOLIC BLOOD PRESSURE: 49 MMHG

## 2018-03-11 VITALS — DIASTOLIC BLOOD PRESSURE: 54 MMHG | SYSTOLIC BLOOD PRESSURE: 117 MMHG

## 2018-03-11 VITALS — DIASTOLIC BLOOD PRESSURE: 51 MMHG | SYSTOLIC BLOOD PRESSURE: 117 MMHG

## 2018-03-11 VITALS — DIASTOLIC BLOOD PRESSURE: 44 MMHG | SYSTOLIC BLOOD PRESSURE: 112 MMHG

## 2018-03-11 VITALS — SYSTOLIC BLOOD PRESSURE: 114 MMHG | DIASTOLIC BLOOD PRESSURE: 44 MMHG

## 2018-03-11 VITALS — SYSTOLIC BLOOD PRESSURE: 126 MMHG | DIASTOLIC BLOOD PRESSURE: 52 MMHG

## 2018-03-11 VITALS — DIASTOLIC BLOOD PRESSURE: 46 MMHG | SYSTOLIC BLOOD PRESSURE: 106 MMHG

## 2018-03-11 VITALS — SYSTOLIC BLOOD PRESSURE: 109 MMHG | DIASTOLIC BLOOD PRESSURE: 49 MMHG

## 2018-03-11 VITALS — SYSTOLIC BLOOD PRESSURE: 114 MMHG | DIASTOLIC BLOOD PRESSURE: 48 MMHG

## 2018-03-11 VITALS — SYSTOLIC BLOOD PRESSURE: 119 MMHG | DIASTOLIC BLOOD PRESSURE: 52 MMHG

## 2018-03-11 VITALS — SYSTOLIC BLOOD PRESSURE: 136 MMHG | DIASTOLIC BLOOD PRESSURE: 55 MMHG

## 2018-03-11 VITALS — DIASTOLIC BLOOD PRESSURE: 52 MMHG | SYSTOLIC BLOOD PRESSURE: 117 MMHG

## 2018-03-11 VITALS — DIASTOLIC BLOOD PRESSURE: 45 MMHG | SYSTOLIC BLOOD PRESSURE: 115 MMHG

## 2018-03-11 VITALS — SYSTOLIC BLOOD PRESSURE: 115 MMHG | DIASTOLIC BLOOD PRESSURE: 45 MMHG

## 2018-03-11 VITALS — SYSTOLIC BLOOD PRESSURE: 122 MMHG | DIASTOLIC BLOOD PRESSURE: 54 MMHG

## 2018-03-11 VITALS — DIASTOLIC BLOOD PRESSURE: 50 MMHG | SYSTOLIC BLOOD PRESSURE: 121 MMHG

## 2018-03-11 VITALS — DIASTOLIC BLOOD PRESSURE: 58 MMHG | SYSTOLIC BLOOD PRESSURE: 111 MMHG

## 2018-03-11 VITALS — SYSTOLIC BLOOD PRESSURE: 110 MMHG | DIASTOLIC BLOOD PRESSURE: 54 MMHG

## 2018-03-11 VITALS — DIASTOLIC BLOOD PRESSURE: 54 MMHG | SYSTOLIC BLOOD PRESSURE: 110 MMHG

## 2018-03-11 VITALS — DIASTOLIC BLOOD PRESSURE: 58 MMHG | SYSTOLIC BLOOD PRESSURE: 109 MMHG

## 2018-03-11 VITALS — DIASTOLIC BLOOD PRESSURE: 48 MMHG | SYSTOLIC BLOOD PRESSURE: 109 MMHG

## 2018-03-11 VITALS — SYSTOLIC BLOOD PRESSURE: 115 MMHG | DIASTOLIC BLOOD PRESSURE: 44 MMHG

## 2018-03-11 VITALS — DIASTOLIC BLOOD PRESSURE: 52 MMHG | SYSTOLIC BLOOD PRESSURE: 113 MMHG

## 2018-03-11 VITALS — SYSTOLIC BLOOD PRESSURE: 117 MMHG | DIASTOLIC BLOOD PRESSURE: 60 MMHG

## 2018-03-11 VITALS — SYSTOLIC BLOOD PRESSURE: 103 MMHG | DIASTOLIC BLOOD PRESSURE: 46 MMHG

## 2018-03-11 VITALS — DIASTOLIC BLOOD PRESSURE: 48 MMHG | SYSTOLIC BLOOD PRESSURE: 98 MMHG

## 2018-03-11 VITALS — SYSTOLIC BLOOD PRESSURE: 99 MMHG | DIASTOLIC BLOOD PRESSURE: 49 MMHG

## 2018-03-11 VITALS — DIASTOLIC BLOOD PRESSURE: 47 MMHG | SYSTOLIC BLOOD PRESSURE: 104 MMHG

## 2018-03-11 VITALS — SYSTOLIC BLOOD PRESSURE: 115 MMHG | DIASTOLIC BLOOD PRESSURE: 54 MMHG

## 2018-03-11 VITALS — SYSTOLIC BLOOD PRESSURE: 122 MMHG | DIASTOLIC BLOOD PRESSURE: 51 MMHG

## 2018-03-11 VITALS — DIASTOLIC BLOOD PRESSURE: 52 MMHG | SYSTOLIC BLOOD PRESSURE: 119 MMHG

## 2018-03-11 VITALS — DIASTOLIC BLOOD PRESSURE: 55 MMHG | SYSTOLIC BLOOD PRESSURE: 136 MMHG

## 2018-03-11 VITALS — DIASTOLIC BLOOD PRESSURE: 56 MMHG | SYSTOLIC BLOOD PRESSURE: 138 MMHG

## 2018-03-11 VITALS — SYSTOLIC BLOOD PRESSURE: 123 MMHG | DIASTOLIC BLOOD PRESSURE: 54 MMHG

## 2018-03-11 LAB
ADD MANUAL DIFF: NO
ALBUMIN SERPL-MCNC: 1.6 G/DL (ref 3.4–5)
ALBUMIN/GLOB SERPL: 0.3 {RATIO} (ref 1–2.7)
ALP SERPL-CCNC: 68 U/L (ref 46–116)
ALT SERPL-CCNC: 20 U/L (ref 12–78)
ANION GAP SERPL CALC-SCNC: 4 MMOL/L (ref 5–15)
AST SERPL-CCNC: 18 U/L (ref 15–37)
BASOPHILS NFR BLD AUTO: 0.4 % (ref 0–2)
BILIRUB SERPL-MCNC: 0.5 MG/DL (ref 0.2–1)
BUN SERPL-MCNC: 50 MG/DL (ref 7–18)
CALCIUM SERPL-MCNC: 9.9 MG/DL (ref 8.5–10.1)
CHLORIDE SERPL-SCNC: 114 MMOL/L (ref 98–107)
CO2 SERPL-SCNC: 33 MMOL/L (ref 21–32)
CREAT SERPL-MCNC: 1.5 MG/DL (ref 0.55–1.3)
EOSINOPHIL NFR BLD AUTO: 0.1 % (ref 0–3)
ERYTHROCYTE [DISTWIDTH] IN BLOOD BY AUTOMATED COUNT: 13.1 % (ref 11.6–14.8)
GLOBULIN SER-MCNC: 5 G/DL
HCT VFR BLD CALC: 35.3 % (ref 42–52)
HGB BLD-MCNC: 11.9 G/DL (ref 14.2–18)
LYMPHOCYTES NFR BLD AUTO: 10.2 % (ref 20–45)
MCV RBC AUTO: 98 FL (ref 80–99)
MONOCYTES NFR BLD AUTO: 6.9 % (ref 1–10)
NEUTROPHILS NFR BLD AUTO: 82.5 % (ref 45–75)
PHOSPHATE SERPL-MCNC: 3 MG/DL (ref 2.5–4.9)
PLATELET # BLD: 192 K/UL (ref 150–450)
POTASSIUM SERPL-SCNC: 3.9 MMOL/L (ref 3.5–5.1)
RBC # BLD AUTO: 3.6 M/UL (ref 4.7–6.1)
SODIUM SERPL-SCNC: 151 MMOL/L (ref 136–145)
WBC # BLD AUTO: 6.4 K/UL (ref 4.8–10.8)

## 2018-03-11 RX ADMIN — LEVALBUTEROL HYDROCHLORIDE SCH MG: 1.25 SOLUTION, CONCENTRATE RESPIRATORY (INHALATION) at 13:10

## 2018-03-11 RX ADMIN — METOCLOPRAMIDE HYDROCHLORIDE SCH MG: 5 SOLUTION ORAL at 12:47

## 2018-03-11 RX ADMIN — DIGOXIN SCH MG: 0.12 TABLET ORAL at 09:05

## 2018-03-11 RX ADMIN — VANCOMYCIN HCL-SODIUM CHLORIDE IV SOLN 1.25 GM/250ML-0.9% SCH MLS/HR: 1.25-0.9/25 SOLUTION at 21:53

## 2018-03-11 RX ADMIN — DILTIAZEM HYDROCHLORIDE SCH MG: 60 TABLET, FILM COATED ORAL at 14:55

## 2018-03-11 RX ADMIN — DOCUSATE SODIUM SCH MG: 50 LIQUID ORAL at 18:42

## 2018-03-11 RX ADMIN — ASPIRIN 81 MG SCH MG: 81 TABLET ORAL at 09:04

## 2018-03-11 RX ADMIN — INSULIN DETEMIR SCH UNITS: 100 INJECTION, SOLUTION SUBCUTANEOUS at 21:00

## 2018-03-11 RX ADMIN — INSULIN ASPART SCH UNITS: 100 INJECTION, SOLUTION INTRAVENOUS; SUBCUTANEOUS at 18:00

## 2018-03-11 RX ADMIN — ACETAMINOPHEN PRN MG: 160 SOLUTION ORAL at 09:04

## 2018-03-11 RX ADMIN — FUROSEMIDE SCH MG: 40 TABLET ORAL at 09:04

## 2018-03-11 RX ADMIN — METOCLOPRAMIDE HYDROCHLORIDE SCH MG: 5 SOLUTION ORAL at 23:43

## 2018-03-11 RX ADMIN — INSULIN ASPART SCH UNITS: 100 INJECTION, SOLUTION INTRAVENOUS; SUBCUTANEOUS at 23:53

## 2018-03-11 RX ADMIN — METOCLOPRAMIDE HYDROCHLORIDE SCH MG: 5 SOLUTION ORAL at 06:11

## 2018-03-11 RX ADMIN — DOCUSATE SODIUM SCH MG: 50 LIQUID ORAL at 12:39

## 2018-03-11 RX ADMIN — MEROPENEM SCH MLS/HR: 1 INJECTION INTRAVENOUS at 14:55

## 2018-03-11 RX ADMIN — DILTIAZEM HYDROCHLORIDE SCH MG: 60 TABLET, FILM COATED ORAL at 21:54

## 2018-03-11 RX ADMIN — METOPROLOL TARTRATE SCH MG: 25 TABLET, FILM COATED ORAL at 20:57

## 2018-03-11 RX ADMIN — DOCUSATE SODIUM SCH MG: 50 LIQUID ORAL at 09:04

## 2018-03-11 RX ADMIN — SITAGLIPTIN SCH MG: 50 TABLET, FILM COATED ORAL at 09:04

## 2018-03-11 RX ADMIN — METOCLOPRAMIDE HYDROCHLORIDE SCH MG: 5 SOLUTION ORAL at 18:42

## 2018-03-11 RX ADMIN — LEVALBUTEROL HYDROCHLORIDE SCH MG: 1.25 SOLUTION, CONCENTRATE RESPIRATORY (INHALATION) at 07:13

## 2018-03-11 RX ADMIN — INSULIN DETEMIR SCH UNITS: 100 INJECTION, SOLUTION SUBCUTANEOUS at 09:05

## 2018-03-11 RX ADMIN — METOPROLOL TARTRATE SCH MG: 25 TABLET, FILM COATED ORAL at 09:04

## 2018-03-11 RX ADMIN — LEVALBUTEROL HYDROCHLORIDE SCH MG: 1.25 SOLUTION, CONCENTRATE RESPIRATORY (INHALATION) at 19:47

## 2018-03-11 NOTE — CONSULTATION
DATE OF CONSULTATION:  03/10/2018



CARDIOLOGY CONSULTATION



CONSULTING PHYSICIAN:  Felipe Acevedo M.D.



REFERRING PHYSICIAN:  Nish Schilling M.D.



REASON FOR CONSULTATION:  Atrial fibrillation with rapid ventricular

response.



HISTORY OF PRESENT ILLNESS:  The patient is an 88-year-old 

gentleman with history of hypertension and dysphagia, status post G-tube

as well as diabetes and functional cardioplegia and dementia, who was

recently in the hospital from 02/07/2018 through 03/05/2018 with

respiratory failure and pneumonia.  The patient was readmitted for

increasing shortness of breath and hypoxia.  The patient developed atrial

fibrillation with rapid ventricular response and _____ the patient  was

then transferred to intensive care unit and started on Cardizem drip.  The

patient is DNI, but not DNR.



PAST MEDICAL HISTORY:

1. Hypertension.

2. Diabetes.

3. History of recurrent pneumonia.

4. Dysphagia, status post PEG placement.

5. Recent healthcare-associated pneumonia.

6. Left hip pressure ulcer.



REVIEW OF SYSTEMS:  Cannot be obtained.



PHYSICAL EXAMINATION:

VITAL SIGNS:  Blood pressure 109/58, pulse 138, and respirations

18.

HEAD AND NECK:  No JVD.

LUNGS:  Coarse rhonchi bilaterally.

CARDIOVASCULAR:  Irregularly irregular, tachycardic.  S1 and S2 with no

gallop or murmur.

ABDOMEN:  Soft.  Status post PEG.

EXTREMITIES:  No pitting edema.



LABORATORY AND DIAGNOSTIC DATA:  White count of 9.7, hemoglobin 13.4,

hematocrit of 40, and platelet count 198.  Troponin negative x2.  Sodium

is 149, potassium 3.0, BUN 38, creatinine 1.2, and glucose of 135.



ASSESSMENT AND PLAN:

1. Atrial fibrillation with rapid ventricular response.  This is due to

the patient's respiratory failure and pneumonia.  I will start the patient

on Cardizem drip per pharmacy.  The patient also received digoxin 0.25 mg

IV.   _____ starting dose of IV digoxin at 0.125 mg daily.  His recent

echocardiogram showed ejection fraction of 60%.

2. Hypertension.  The patient is on metoprolol and Cardizem.

3. Healthcare-associated pneumonia.  Sputum culture grew multidrug

resistant Providencia.  IV antibiotic per Dr. Gross.

4. Urinary tract infection and sepsis.

5. Respiratory failure, on face mask.  He is Do Not Intubate.

6. Diabetes.

7. Dysphagia, status post percutaneous endoscopic gastrostomy

placement.



Thank you very much, Dr. Schilling, for allowing me to participate in

the care of this patient.  Please do not hesitate to contact for any

questions regarding my evaluation.









  ______________________________________________

  Felipe Acevedo M.D.





DR:  CHRISTIANO

D:  03/10/2018 15:28

T:  03/10/2018 22:39

JOB#:  [id number]

CC:

## 2018-03-11 NOTE — PULMONOLGY CRITICAL CARE NOTE
Critical Care - Asmt/Plan


Problems:  


(1) Rapid atrial fibrillation


(2) Acute respiratory failure with hypoxia


(3) Sepsis


(4) Diabetes mellitus


(5) Functional quadriplegia


(6) Feeding by G-tube


Respiratory:  monitor respiratory rate, adjust FIO2


Cardiac:  continue to monitor HR/BP, other - on cardizem drip


Renal:  F/U I&O, keep IV fluid


Infectious Disease:  check cultures, continue antibiotics


Gastrointestinal:  continue feedings/current rate


Endocrine:  monitor blood sugar, continue sliding scale insulin, oral diabetic 

meds


Hematologic:  transfuse if hgb<8.5


Neurologic:  PRN Ativan, PRN Morphine, keep patient comfortable


Affect:  PRN ativan


Prophylaxis:  Protonix


Disposition:  keep in ICU


Notes Reviewed:  internist, cardio


Discussed with:  nurses, consultants, 





Critical Care - Objective





Last 24 Hour Vital Signs








  Date Time  Temp Pulse Resp B/P (MAP) Pulse Ox O2 Delivery O2 Flow Rate FiO2


 


3/11/18 09:05  111      


 


3/11/18 09:04  111  115/45    


 


3/11/18 09:04 100.3       


 


3/11/18 08:00  107      


 


3/11/18 08:00 100.3 109 26 115/45 97 Non-Rebreather 15.0 





 100.3       


 


3/11/18 07:26  106 32  94 Non-Rebreather 15.0 100


 


3/11/18 07:16      Non-Rebreather 15.0 100


 


3/11/18 07:08  105 32  98 Non-Rebreather  100


 


3/11/18 07:06     98 Non-Rebreather 15.0 100


 


3/11/18 07:00  109 28 115/54 95 Non-Rebreather 15.0 


 


3/11/18 06:30  104 28 117/54 95 Non-Rebreather 15.0 


 


3/11/18 06:00  101 27 122/51 97 Non-Rebreather 15.0 


 


3/11/18 05:30  101 27 121/50 97 Non-Rebreather 15.0 


 


3/11/18 05:00  100 27 115/44 97 Non-Rebreather 15.0 


 


3/11/18 04:30  95 27 112/44 97 Non-Rebreather 15.0 


 


3/11/18 04:00 99.0 97 27 115/44 97 Non-Rebreather 15.0 





 99.0       


 


3/11/18 04:00  100      


 


3/11/18 03:30  97 28 114/44 97 Non-Rebreather 15.0 


 


3/11/18 03:00  92 28 113/52 98 Non-Rebreather 15.0 


 


3/11/18 02:30  90 28 117/52 98 Non-Rebreather 15.0 


 


3/11/18 02:00  92 29 119/52 98 Non-Rebreather 15.0 


 


3/11/18 01:30  95 29 119/52 97 Non-Rebreather 15.0 


 


3/11/18 01:04  94  110/54    


 


3/11/18 01:00  94 29 109/58 98 Non-Rebreather 15.0 


 


3/11/18 00:30  95 29 111/58 98 Non-Rebreather 15.0 


 


3/11/18 00:00 99.0 95 30 110/54 96 Non-Rebreather 15.0 





 99.0       


 


3/11/18 00:00  90      


 


3/10/18 23:30  95 30 110/54 96 Non-Rebreather 15.0 


 


3/10/18 23:00  95 30 100/54 96 Non-Rebreather 15.0 


 


3/10/18 22:30  86 33 105/54 99 Non-Rebreather 15.0 


 


3/10/18 22:00  84 33 98/54 99 Non-Rebreather 15.0 


 


3/10/18 21:30  84 32 90/54 99 Non-Rebreather 15.0 


 


3/10/18 21:00  85 32 100/60 97 Non-Rebreather 15.0 


 


3/10/18 20:51  109  120/61    


 


3/10/18 20:30  105 32 102/53 98 Non-Rebreather 15.0 


 


3/10/18 20:00  106      


 


3/10/18 20:00 98.0 107 32 110/60 97 Non-Rebreather 15.0 





 98.0       


 


3/10/18 19:35  112 20  95 Non-Rebreather 15.0 100


 


3/10/18 19:25  110 20  95 Non-Rebreather  100


 


3/10/18 19:24      Non-Rebreather 15.0 100


 


3/10/18 19:24     95 Non-Rebreather 15.0 100


 


3/10/18 19:00 98.4 116 37 123/64 96 Non-Rebreather 15.0 





 98.4       


 


3/10/18 18:30  107 38 103/52 95 Non-Rebreather 15.0 


 


3/10/18 18:00  107 40 129/87 95 Non-Rebreather 15.0 


 


3/10/18 17:30  108 37 129/87 96 Non-Rebreather 15.0 


 


3/10/18 17:00  114 37 112/55 95 Non-Rebreather 15.0 


 


3/10/18 16:30  113 39 112/56 97 Non-Rebreather 15.0 


 


3/10/18 16:15  113 39 126/57 96 Non-Rebreather 15.0 


 


3/10/18 16:00  108      


 


3/10/18 16:00 99.8 117 39 118/38 96 Non-Rebreather 15.0 





 99.8       


 


3/10/18 15:45  117 40 112/58 96 Non-Rebreather 15.0 


 


3/10/18 15:30  134 39 109/58 96 Non-Rebreather 15.0 


 


3/10/18 15:22  138  109/58    


 


3/10/18 15:00 100.1 133 37 102/47 97 Non-Rebreather 15.0 





 100.1       


 


3/10/18 13:34  123 20  96 Non-Rebreather 15.0 100


 


3/10/18 13:16  122 20  92 Venturi Mask 14.0 55


 


3/10/18 12:31 99.4       


 


3/10/18 12:01 100.0       


 


3/10/18 12:00 100.0 121 30 135/85 88 Venturi Mask 14.0 





 100.0       


 


3/10/18 12:00  169      


 


3/10/18 11:24  130      








Status:  awake


Condition:  critical


HEENT:  atraumatic


Lungs:  clear


Heart:  HR/BP stable, HR/BP unstable


Abdomen:  soft, non-tender, active bowel sounds


Decubiti:  location


Accucheck:  293





Critical Care - Subjective


ROS Limited/Unobtainable:  No


ICU Day:  1


Interval Events:


transferred to ICU for rapid afib


FI02:  100


Sputum Amount:  Large


Tube Feeding Amount:  55


I&O:











Intake and Output  


 


 3/10/18 3/11/18





 19:00 07:00


 


Intake Total 1650.0 ml 1226.67 ml


 


Output Total 810 ml 610 ml


 


Balance 840.0 ml 616.67 ml


 


  


 


Free Water 30 ml 190 ml


 


IV Total 775.0 ml 376.67 ml


 


Tube Feeding 645 ml 660 ml


 


Other 200 ml 


 


Output Urine Total 810 ml 610 ml


 


# Bowel Movements 1 4








CXR:


RLL pneumonia, atelectasis


Labs:





Laboratory Tests








Test


  3/10/18


13:00 3/10/18


19:34 3/11/18


04:15 3/11/18


04:50


 


Troponin I


  0.000 ng/mL


(0.000-0.056) 


  0.000 ng/mL


(0.000-0.056) 


 


 


Thyroid Stimulating Hormone


(TSH) 0.708 uiU/mL


(0.358-3.740) 


  


  


 


 


Free Thyroxine


  1.10 NG/DL


(0.76-1.46) 


  


  


 


 


Random Vancomycin Level  12.3 ug/mL    


 


White Blood Count


  


  


  6.4 K/UL


(4.8-10.8) 


 


 


Red Blood Count


  


  


  3.60 M/UL


(4.70-6.10)  L 


 


 


Hemoglobin


  


  


  11.9 G/DL


(14.2-18.0)  L 


 


 


Hematocrit


  


  


  35.3 %


(42.0-52.0)  L 


 


 


Mean Corpuscular Volume   98 FL (80-99)   


 


Mean Corpuscular Hemoglobin


  


  


  33.1 PG


(27.0-31.0)  H 


 


 


Mean Corpuscular Hemoglobin


Concent 


  


  33.7 G/DL


(32.0-36.0) 


 


 


Red Cell Distribution Width


  


  


  13.1 %


(11.6-14.8) 


 


 


Platelet Count


  


  


  192 K/UL


(150-450) 


 


 


Mean Platelet Volume


  


  


  8.6 FL


(6.5-10.1) 


 


 


Neutrophils (%) (Auto)


  


  


  82.5 %


(45.0-75.0)  H 


 


 


Lymphocytes (%) (Auto)


  


  


  10.2 %


(20.0-45.0)  L 


 


 


Monocytes (%) (Auto)


  


  


  6.9 %


(1.0-10.0) 


 


 


Eosinophils (%) (Auto)


  


  


  0.1 %


(0.0-3.0) 


 


 


Basophils (%) (Auto)


  


  


  0.4 %


(0.0-2.0) 


 


 


Uric Acid


  


  


  5.7 MG/DL


(2.6-7.2) 


 


 


Pro-B-Type Natriuretic Peptide


  


  


  1480 pg/mL


(0-125)  H 


 


 


Digoxin Level


  


  


  0.3 NG/ML


(0.5-2.0)  L 


 


 


Sodium Level


  


  


  


  151 MMOL/L


(136-145)  H


 


Potassium Level


  


  


  


  3.9 MMOL/L


(3.5-5.1)


 


Chloride Level


  


  


  


  114 MMOL/L


()  H


 


Carbon Dioxide Level


  


  


  


  33 MMOL/L


(21-32)  H


 


Anion Gap


  


  


  


  4 mmol/L


(5-15)  L


 


Blood Urea Nitrogen


  


  


  


  50 mg/dL


(7-18)  H


 


Creatinine


  


  


  


  1.5 MG/DL


(0.55-1.30)  H


 


Estimat Glomerular Filtration


Rate 


  


  


   mL/min (>60)  


 


 


Glucose Level


  


  


  


  314 MG/DL


()  #H


 


Calcium Level


  


  


  


  9.9 MG/DL


(8.5-10.1)


 


Phosphorus Level


  


  


  


  3.0 MG/DL


(2.5-4.9)


 


Magnesium Level


  


  


  


  2.4 MG/DL


(1.8-2.4)


 


Total Bilirubin


  


  


  


  0.5 MG/DL


(0.2-1.0)


 


Aspartate Amino Transf


(AST/SGOT) 


  


  


  18 U/L (15-37)


 


 


Alanine Aminotransferase


(ALT/SGPT) 


  


  


  20 U/L (12-78)


 


 


Alkaline Phosphatase


  


  


  


  68 U/L


()


 


Total Protein


  


  


  


  6.6 G/DL


(6.4-8.2)


 


Albumin


  


  


  


  1.6 G/DL


(3.4-5.0)  L


 


Globulin    5.0 g/dL  


 


Albumin/Globulin Ratio


  


  


  


  0.3 (1.0-2.7)


L

















JOAN DEMARCO Mar 11, 2018 09:56

## 2018-03-11 NOTE — DIAGNOSTIC IMAGING REPORT
Indication: Cough

 

Technique: XRAY Chest 1v

 

Comparison: 3/9/2018

 

Findings: Cardiomediastinal silhouette is stable. Interstitial and patchy airspace

edema/infiltrates are noted. There is lung base atelectasis. Osseous structures are

stable.

 

Impression: 

 

Bilateral interstitial and patchy airspace edema/infiltrates. Lung base atelectasis.

## 2018-03-11 NOTE — INFECTIOUS DISEASES PROG NOTE
Assessment/Plan


Problems:  


(1) HCAP (healthcare-associated pneumonia)


Assessment & Plan:  not improving much , with sputum culture grew MDR 

Providencia stuartii , will switch ertapenem to meropenem to broaden his 

coverage , and continue vancomycin empiric coverage for MRSA to treat his 

pneumonia . continue  aspiration precaution with suctioning . monitor cxr . 

prognosis is poor , D/W POA , and his nephew .





(2) UTI (urinary tract infection)


Assessment & Plan:  no urine culture was collected , already on meropenem which

  covers  his UTI 





(3) Sepsis


Assessment & Plan:  due to the above, continue vancomycin and meropenem ,  

initial blood culture remains negative, will repeat blood culture today   





(4) Acute respiratory failure


Assessment & Plan:  due to the above , not improving, on NRM with high flow 

oxygen , continue inhalers and high flow oxygen, monitor CXR  





(5) Diabetes mellitus


Assessment & Plan:  recommend tight glycemic control to keep blood glucose 

between 100-140 





(6) Fever


Assessment & Plan:  due to the above,  continue wide spectrum antibiotics , and 

Tylenol PRN 








Subjective


ROS Limited/Unobtainable:  Yes


Allergies:  


Coded Allergies:  


     No Known Allergies (Unverified , 3/23/16)


Subjective


he was lying  in bed,  lethargic , still on high flow oxygen via non 

rebreathable mask , comfortable, had low grade fever , no  chills , no SOB, 

coughing dark phlegm, with harsh breathing sounds, no diarrhea





Objective


Vital Signs





Last 24 Hour Vital Signs








  Date Time  Temp Pulse Resp B/P (MAP) Pulse Ox O2 Delivery O2 Flow Rate FiO2


 


3/11/18 12:00 100.3 92 28 104/47 99 Non-Rebreather 15.0 





 100.3       


 


3/11/18 12:00  93      


 


3/11/18 11:30  86 29 109/49 98 Non-Rebreather 15.0 


 


3/11/18 11:00  89 25 109/48 98 Non-Rebreather 15.0 


 


3/11/18 10:30  84 28 100/49 97 Non-Rebreather 15.0 


 


3/11/18 10:00  85 24 98/48 97 Non-Rebreather 15.0 


 


3/11/18 09:34 100.0       


 


3/11/18 09:30  102 28 122/54 95 Non-Rebreather 15.0 


 


3/11/18 09:05  111      


 


3/11/18 09:04  111  115/45    


 


3/11/18 09:04 100.3       


 


3/11/18 09:00  109 28 123/54 95 Non-Rebreather 15.0 


 


3/11/18 08:30  106 23 115/45 95 Non-Rebreather 15.0 


 


3/11/18 08:00  107      


 


3/11/18 08:00 100.3 109 26 115/45 97 Non-Rebreather 15.0 





 100.3       


 


3/11/18 07:30  109 28 110/54 95 Non-Rebreather 15.0 


 


3/11/18 07:26  106 32  94 Non-Rebreather 15.0 100


 


3/11/18 07:16      Non-Rebreather 15.0 100


 


3/11/18 07:08  105 32  98 Non-Rebreather  100


 


3/11/18 07:06     98 Non-Rebreather 15.0 100


 


3/11/18 07:00  109 28 115/54 95 Non-Rebreather 15.0 


 


3/11/18 06:30  104 28 117/54 95 Non-Rebreather 15.0 


 


3/11/18 06:00  101 27 122/51 97 Non-Rebreather 15.0 


 


3/11/18 05:30  101 27 121/50 97 Non-Rebreather 15.0 


 


3/11/18 05:00  100 27 115/44 97 Non-Rebreather 15.0 


 


3/11/18 04:30  95 27 112/44 97 Non-Rebreather 15.0 


 


3/11/18 04:00 99.0 97 27 115/44 97 Non-Rebreather 15.0 





 99.0       


 


3/11/18 04:00  100      


 


3/11/18 03:30  97 28 114/44 97 Non-Rebreather 15.0 


 


3/11/18 03:00  92 28 113/52 98 Non-Rebreather 15.0 


 


3/11/18 02:30  90 28 117/52 98 Non-Rebreather 15.0 


 


3/11/18 02:00  92 29 119/52 98 Non-Rebreather 15.0 


 


3/11/18 01:30  95 29 119/52 97 Non-Rebreather 15.0 


 


3/11/18 01:04  94  110/54    


 


3/11/18 01:00  94 29 109/58 98 Non-Rebreather 15.0 


 


3/11/18 00:30  95 29 111/58 98 Non-Rebreather 15.0 


 


3/11/18 00:00 99.0 95 30 110/54 96 Non-Rebreather 15.0 





 99.0       


 


3/11/18 00:00  90      


 


3/10/18 23:30  95 30 110/54 96 Non-Rebreather 15.0 


 


3/10/18 23:00  95 30 100/54 96 Non-Rebreather 15.0 


 


3/10/18 22:30  86 33 105/54 99 Non-Rebreather 15.0 


 


3/10/18 22:00  84 33 98/54 99 Non-Rebreather 15.0 


 


3/10/18 21:30  84 32 90/54 99 Non-Rebreather 15.0 


 


3/10/18 21:00  85 32 100/60 97 Non-Rebreather 15.0 


 


3/10/18 20:51  109  120/61    


 


3/10/18 20:30  105 32 102/53 98 Non-Rebreather 15.0 


 


3/10/18 20:00  106      


 


3/10/18 20:00 98.0 107 32 110/60 97 Non-Rebreather 15.0 





 98.0       


 


3/10/18 19:35  112 20  95 Non-Rebreather 15.0 100


 


3/10/18 19:25  110 20  95 Non-Rebreather  100


 


3/10/18 19:24      Non-Rebreather 15.0 100


 


3/10/18 19:24     95 Non-Rebreather 15.0 100


 


3/10/18 19:00 98.4 116 37 123/64 96 Non-Rebreather 15.0 





 98.4       


 


3/10/18 18:30  107 38 103/52 95 Non-Rebreather 15.0 


 


3/10/18 18:00  107 40 129/87 95 Non-Rebreather 15.0 


 


3/10/18 17:30  108 37 129/87 96 Non-Rebreather 15.0 


 


3/10/18 17:00  114 37 112/55 95 Non-Rebreather 15.0 


 


3/10/18 16:30  113 39 112/56 97 Non-Rebreather 15.0 


 


3/10/18 16:15  113 39 126/57 96 Non-Rebreather 15.0 


 


3/10/18 16:00  108      


 


3/10/18 16:00 99.8 117 39 118/38 96 Non-Rebreather 15.0 





 99.8       


 


3/10/18 15:45  117 40 112/58 96 Non-Rebreather 15.0 


 


3/10/18 15:30  134 39 109/58 96 Non-Rebreather 15.0 


 


3/10/18 15:22  138  109/58    


 


3/10/18 15:00 100.1 133 37 102/47 97 Non-Rebreather 15.0 





 100.1       


 


3/10/18 13:34  123 20  96 Non-Rebreather 15.0 100


 


3/10/18 13:16  122 20  92 Venturi Mask 14.0 55








Height (Feet):  6


Height (Inches):  1.00


Weight (Pounds):  168


General Appearance:  WD/WN, no acute distress


HEENT:  normocephalic, atraumatic, anicteric, mucous membranes moist, supple, 

no JVD


Respiratory/Chest:  chest wall non-tender, no respiratory distress, no 

accessory muscle use, decreased breath sounds, crackles/rales, expiratory 

wheezing


Cardiovascular:  normal peripheral pulses, normal rate, regular rhythm, no 

gallop/murmur, no JVD


Abdomen:  normal bowel sounds, soft, non tender, no organomegaly, non distended

, no mass, no scars


Extremities:  no cyanosis, no clubbing


Skin:  no rash, no lesions, ulcers


Neurologic/Psychiatric:  responsive


Lymphatic:  no neck adenopathy, no groin adenopathy





Laboratory Tests








Test


  3/10/18


13:00 3/10/18


19:34 3/11/18


04:15 3/11/18


04:50


 


Troponin I


  0.000 ng/mL


(0.000-0.056) 


  0.000 ng/mL


(0.000-0.056) 


 


 


Thyroid Stimulating Hormone


(TSH) 0.708 uiU/mL


(0.358-3.740) 


  


  


 


 


Free Thyroxine


  1.10 NG/DL


(0.76-1.46) 


  


  


 


 


Random Vancomycin Level  12.3 ug/mL    


 


White Blood Count


  


  


  6.4 K/UL


(4.8-10.8) 


 


 


Red Blood Count


  


  


  3.60 M/UL


(4.70-6.10)  L 


 


 


Hemoglobin


  


  


  11.9 G/DL


(14.2-18.0)  L 


 


 


Hematocrit


  


  


  35.3 %


(42.0-52.0)  L 


 


 


Mean Corpuscular Volume   98 FL (80-99)   


 


Mean Corpuscular Hemoglobin


  


  


  33.1 PG


(27.0-31.0)  H 


 


 


Mean Corpuscular Hemoglobin


Concent 


  


  33.7 G/DL


(32.0-36.0) 


 


 


Red Cell Distribution Width


  


  


  13.1 %


(11.6-14.8) 


 


 


Platelet Count


  


  


  192 K/UL


(150-450) 


 


 


Mean Platelet Volume


  


  


  8.6 FL


(6.5-10.1) 


 


 


Neutrophils (%) (Auto)


  


  


  82.5 %


(45.0-75.0)  H 


 


 


Lymphocytes (%) (Auto)


  


  


  10.2 %


(20.0-45.0)  L 


 


 


Monocytes (%) (Auto)


  


  


  6.9 %


(1.0-10.0) 


 


 


Eosinophils (%) (Auto)


  


  


  0.1 %


(0.0-3.0) 


 


 


Basophils (%) (Auto)


  


  


  0.4 %


(0.0-2.0) 


 


 


Uric Acid


  


  


  5.7 MG/DL


(2.6-7.2) 


 


 


Pro-B-Type Natriuretic Peptide


  


  


  1480 pg/mL


(0-125)  H 


 


 


Digoxin Level


  


  


  0.3 NG/ML


(0.5-2.0)  L 


 


 


Sodium Level


  


  


  


  151 MMOL/L


(136-145)  H


 


Potassium Level


  


  


  


  3.9 MMOL/L


(3.5-5.1)


 


Chloride Level


  


  


  


  114 MMOL/L


()  H


 


Carbon Dioxide Level


  


  


  


  33 MMOL/L


(21-32)  H


 


Anion Gap


  


  


  


  4 mmol/L


(5-15)  L


 


Blood Urea Nitrogen


  


  


  


  50 mg/dL


(7-18)  H


 


Creatinine


  


  


  


  1.5 MG/DL


(0.55-1.30)  H


 


Estimat Glomerular Filtration


Rate 


  


  


   mL/min (>60)  


 


 


Glucose Level


  


  


  


  314 MG/DL


()  #H


 


Calcium Level


  


  


  


  9.9 MG/DL


(8.5-10.1)


 


Phosphorus Level


  


  


  


  3.0 MG/DL


(2.5-4.9)


 


Magnesium Level


  


  


  


  2.4 MG/DL


(1.8-2.4)


 


Total Bilirubin


  


  


  


  0.5 MG/DL


(0.2-1.0)


 


Aspartate Amino Transf


(AST/SGOT) 


  


  


  18 U/L (15-37)


 


 


Alanine Aminotransferase


(ALT/SGPT) 


  


  


  20 U/L (12-78)


 


 


Alkaline Phosphatase


  


  


  


  68 U/L


()


 


Total Protein


  


  


  


  6.6 G/DL


(6.4-8.2)


 


Albumin


  


  


  


  1.6 G/DL


(3.4-5.0)  L


 


Globulin    5.0 g/dL  


 


Albumin/Globulin Ratio


  


  


  


  0.3 (1.0-2.7)


L











Current Medications








 Medications


  (Trade)  Dose


 Ordered  Sig/[place]


 Route


 PRN Reason  Start Time


 Stop Time Status Last Admin


Dose Admin


 


 Acetaminophen


  (Tylenol)  650 mg  Q6H  PRN


 GT


 Mild Pain/Temp > 100.5  3/7/18 12:15


 4/6/18 12:14  3/11/18 09:04


 


 


 Aspirin


  (ASA)  81 mg  DAILY


 GT


   3/8/18 09:00


 4/7/18 08:59  3/11/18 09:04


 


 


 Digoxin


  (Lanoxin)  0.25 mg  DAILY


 PEG


   3/11/18 09:00


 4/10/18 08:59  3/11/18 09:05


 


 


 Docusate Sodium


  (Colace)  100 mg  TID


 GT


   3/7/18 13:00


 4/6/18 12:59  3/11/18 09:04


 


 


 Furosemide


  (Lasix)  40 mg  DAILY


 GT


   3/8/18 09:00


 4/7/18 08:59  3/11/18 09:04


 


 


 Insulin Detemir


  (Levemir)  20 units  Q12HR


 SUBQ


   3/9/18 21:00


 4/7/18 08:59  3/11/18 09:05


 


 


 Levalbuterol HCl


  (Xopenex)  1.25 mg  TIDRT


 HHN


   3/9/18 08:00


 3/14/18 07:59  3/11/18 07:13


 


 


 Meropenem 1 gm/


 Sodium Chloride  55 ml @ 


 110 mls/hr  Q12HR@0200,1400


 IVPB


   3/11/18 14:00


 3/16/18 13:59   


 


 


 Metoclopramide HCl


  (Reglan)  5 mg  EVERY 6  HOURS


 GT


   3/7/18 12:30


 4/6/18 12:29  3/11/18 12:47


 


 


 Metoprolol


 Tartrate


  (Lopressor)  25 mg  Q12HR


 ORAL


   3/10/18 21:00


 4/9/18 20:59  3/11/18 09:04


 


 


 Sitagliptin


 Phosphate


  (Januvia)  50 mg  DAILY


 GT


   3/8/18 09:00


 4/7/18 08:59  3/11/18 09:04


 


 


 Theophylline


  (Theophylline)  80 mg  Q8HR


 GT


   3/7/18 14:00


 4/6/18 13:59  3/11/18 06:11


 


 


 Vancomycin HCl


  (Vanco rx to


 dose)  1 ea  DAILY  PRN


 MISC


 Per rx protocol  3/7/18 12:00


 4/6/18 11:59   


 


 


 Vancomycin HCl/


 Dextrose  250 ml @ 


 166.667


 mls/hr  Q24H


 IVPB


   3/10/18 22:00


 3/15/18 21:59  3/10/18 22:21


 

















Amira Gross M.D. Mar 11, 2018 12:56

## 2018-03-11 NOTE — CARDIOLOGY REPORT
--------------- APPROVED REPORT --------------





EKG Measurement

Heart [id number]

SD [id number]

[id number]

[id number]

[id number]





Sinus tachycardia

Otherwise normal ECG

## 2018-03-11 NOTE — GENERAL PROGRESS NOTE
Assessment/Plan


Problem List:  


(1) Acute respiratory failure with hypoxia


Assessment & Plan:  Pneumonia


ICD Codes:  J96.01 - Acute respiratory failure with hypoxia


SNOMED:  15170230, 774212954


(2) Dementia


ICD Codes:  F03.90 - Unspecified dementia without behavioral disturbance


SNOMED:  79174607


Qualifiers:  


   Qualified Codes:  F03.90 - Unspecified dementia without behavioral 

disturbance


(3) Functional quadriplegia


ICD Codes:  R53.2 - Functional quadriplegia


SNOMED:  460801607245819


(4) Feeding by G-tube


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


SNOMED:  278289930, 780665696


(5) Atrial flutter with rapid ventricular response


ICD Codes:  I48.92 - Unspecified atrial flutter


SNOMED:  1671500


Status:  deteriorating


Assessment/Plan


per cardiology


attempt to discuss comfort care


K supplement as needed


increase feeding


breathing treatment-


Antibiotics-


per orders





Subjective


ROS Limited/Unobtainable:  Yes


Allergies:  


Coded Allergies:  


     No Known Allergies (Unverified , 3/23/16)





Objective





Last 24 Hour Vital Signs








  Date Time  Temp Pulse Resp B/P (MAP) Pulse Ox O2 Delivery O2 Flow Rate FiO2


 


3/11/18 15:00  97 26 121/51 98 Non-Rebreather 15.0 


 


3/11/18 14:55  96  123/49    


 


3/11/18 14:30  100 27 117/60 95 Non-Rebreather 15.0 


 


3/11/18 14:00  102 32 114/47 98 Non-Rebreather 15.0 


 


3/11/18 13:30  96 29 99/49 98 Non-Rebreather 15.0 


 


3/11/18 13:19  97 32  96 Non-Rebreather 15.0 100


 


3/11/18 13:06  87 30  96 Non-Rebreather  100


 


3/11/18 13:00  94 28 114/48 99 Non-Rebreather 15.0 


 


3/11/18 12:30  93 27 105/50 98 Non-Rebreather 15.0 


 


3/11/18 12:00 100.3 92 28 104/47 99 Non-Rebreather 15.0 





 100.3       


 


3/11/18 12:00  93      


 


3/11/18 11:30  86 29 109/49 98 Non-Rebreather 15.0 


 


3/11/18 11:00  89 25 109/48 98 Non-Rebreather 15.0 


 


3/11/18 10:30  84 28 100/49 97 Non-Rebreather 15.0 


 


3/11/18 10:00  85 24 98/48 97 Non-Rebreather 15.0 


 


3/11/18 09:34 100.0       


 


3/11/18 09:30  102 28 122/54 95 Non-Rebreather 15.0 


 


3/11/18 09:05  111      


 


3/11/18 09:04  111  115/45    


 


3/11/18 09:04 100.3       


 


3/11/18 09:00  109 28 123/54 95 Non-Rebreather 15.0 


 


3/11/18 08:30  106 23 115/45 95 Non-Rebreather 15.0 


 


3/11/18 08:00  107      


 


3/11/18 08:00 100.3 109 26 115/45 97 Non-Rebreather 15.0 





 100.3       


 


3/11/18 07:30  109 28 110/54 95 Non-Rebreather 15.0 


 


3/11/18 07:26  106 32  94 Non-Rebreather 15.0 100


 


3/11/18 07:16      Non-Rebreather 15.0 100


 


3/11/18 07:08  105 32  98 Non-Rebreather  100


 


3/11/18 07:06     98 Non-Rebreather 15.0 100


 


3/11/18 07:00  109 28 115/54 95 Non-Rebreather 15.0 


 


3/11/18 06:30  104 28 117/54 95 Non-Rebreather 15.0 


 


3/11/18 06:00  101 27 122/51 97 Non-Rebreather 15.0 


 


3/11/18 05:30  101 27 121/50 97 Non-Rebreather 15.0 


 


3/11/18 05:00  100 27 115/44 97 Non-Rebreather 15.0 


 


3/11/18 04:30  95 27 112/44 97 Non-Rebreather 15.0 


 


3/11/18 04:00 99.0 97 27 115/44 97 Non-Rebreather 15.0 





 99.0       


 


3/11/18 04:00  100      


 


3/11/18 03:30  97 28 114/44 97 Non-Rebreather 15.0 


 


3/11/18 03:00  92 28 113/52 98 Non-Rebreather 15.0 


 


3/11/18 02:30  90 28 117/52 98 Non-Rebreather 15.0 


 


3/11/18 02:00  92 29 119/52 98 Non-Rebreather 15.0 


 


3/11/18 01:30  95 29 119/52 97 Non-Rebreather 15.0 


 


3/11/18 01:04  94  110/54    


 


3/11/18 01:00  94 29 109/58 98 Non-Rebreather 15.0 


 


3/11/18 00:30  95 29 111/58 98 Non-Rebreather 15.0 


 


3/11/18 00:00 99.0 95 30 110/54 96 Non-Rebreather 15.0 





 99.0       


 


3/11/18 00:00  90      


 


3/10/18 23:30  95 30 110/54 96 Non-Rebreather 15.0 


 


3/10/18 23:00  95 30 100/54 96 Non-Rebreather 15.0 


 


3/10/18 22:30  86 33 105/54 99 Non-Rebreather 15.0 


 


3/10/18 22:00  84 33 98/54 99 Non-Rebreather 15.0 


 


3/10/18 21:30  84 32 90/54 99 Non-Rebreather 15.0 


 


3/10/18 21:00  85 32 100/60 97 Non-Rebreather 15.0 


 


3/10/18 20:51  109  120/61    


 


3/10/18 20:30  105 32 102/53 98 Non-Rebreather 15.0 


 


3/10/18 20:00  106      


 


3/10/18 20:00 98.0 107 32 110/60 97 Non-Rebreather 15.0 





 98.0       


 


3/10/18 19:35  112 20  95 Non-Rebreather 15.0 100


 


3/10/18 19:25  110 20  95 Non-Rebreather  100


 


3/10/18 19:24      Non-Rebreather 15.0 100


 


3/10/18 19:24     95 Non-Rebreather 15.0 100


 


3/10/18 19:00 98.4 116 37 123/64 96 Non-Rebreather 15.0 





 98.4       


 


3/10/18 18:30  107 38 103/52 95 Non-Rebreather 15.0 


 


3/10/18 18:00  107 40 129/87 95 Non-Rebreather 15.0 


 


3/10/18 17:30  108 37 129/87 96 Non-Rebreather 15.0 


 


3/10/18 17:00  114 37 112/55 95 Non-Rebreather 15.0 


 


3/10/18 16:30  113 39 112/56 97 Non-Rebreather 15.0 


 


3/10/18 16:15  113 39 126/57 96 Non-Rebreather 15.0 

















Intake and Output  


 


 3/10/18 3/11/18





 19:00 07:00


 


Intake Total 1650.0 ml 1226.67 ml


 


Output Total 810 ml 610 ml


 


Balance 840.0 ml 616.67 ml


 


  


 


Free Water 30 ml 190 ml


 


IV Total 775.0 ml 376.67 ml


 


Tube Feeding 645 ml 660 ml


 


Other 200 ml 


 


Output Urine Total 810 ml 610 ml


 


# Bowel Movements 1 4








Laboratory Tests


3/10/18 19:34: Random Vancomycin Level 12.3


3/11/18 04:15: 


White Blood Count 6.4, Red Blood Count 3.60L, Hemoglobin 11.9L, Hematocrit 35.3L

, Mean Corpuscular Volume 98, Mean Corpuscular Hemoglobin 33.1H, Mean 

Corpuscular Hemoglobin Concent 33.7, Red Cell Distribution Width 13.1, Platelet 

Count 192, Mean Platelet Volume 8.6, Neutrophils (%) (Auto) 82.5H, Lymphocytes (

%) (Auto) 10.2L, Monocytes (%) (Auto) 6.9, Eosinophils (%) (Auto) 0.1, 

Basophils (%) (Auto) 0.4, Uric Acid 5.7, Troponin I 0.000, Pro-B-Type 

Natriuretic Peptide 1480H, Digoxin Level 0.3L


3/11/18 04:50: 


Sodium Level 151H, Potassium Level 3.9, Chloride Level 114H, Carbon Dioxide 

Level 33H, Anion Gap 4L, Blood Urea Nitrogen 50H, Creatinine 1.5H, Estimat 

Glomerular Filtration Rate , Glucose Level 314#H, Calcium Level 9.9, Phosphorus 

Level 3.0, Magnesium Level 2.4, Total Bilirubin 0.5, Aspartate Amino Transf (AST

/SGOT) 18, Alanine Aminotransferase (ALT/SGPT) 20, Alkaline Phosphatase 68, 

Total Protein 6.6, Albumin 1.6L, Globulin 5.0, Albumin/Globulin Ratio 0.3L


Height (Feet):  6


Height (Inches):  1.00


Weight (Pounds):  168


Cardiovascular:  tachycardia, arrhythmia


Respiratory/Chest:  decreased breath sounds


Abdomen:  distended











NAY CALDWELL Mar 11, 2018 16:06

## 2018-03-11 NOTE — CARDIOLOGY REPORT
--------------- APPROVED REPORT --------------





EKG Measurement

Heart [id number]

NH 126P50

[id number]

[id number]

[id number]





Sinus tachycardia with premature atrial complexes and premature ventricular complexes or 

fusion complexes

Otherwise normal ECG

## 2018-03-12 VITALS — DIASTOLIC BLOOD PRESSURE: 62 MMHG | SYSTOLIC BLOOD PRESSURE: 125 MMHG

## 2018-03-12 VITALS — SYSTOLIC BLOOD PRESSURE: 129 MMHG | DIASTOLIC BLOOD PRESSURE: 57 MMHG

## 2018-03-12 VITALS — SYSTOLIC BLOOD PRESSURE: 130 MMHG | DIASTOLIC BLOOD PRESSURE: 56 MMHG

## 2018-03-12 VITALS — DIASTOLIC BLOOD PRESSURE: 52 MMHG | SYSTOLIC BLOOD PRESSURE: 132 MMHG

## 2018-03-12 VITALS — DIASTOLIC BLOOD PRESSURE: 56 MMHG | SYSTOLIC BLOOD PRESSURE: 130 MMHG

## 2018-03-12 VITALS — SYSTOLIC BLOOD PRESSURE: 128 MMHG | DIASTOLIC BLOOD PRESSURE: 55 MMHG

## 2018-03-12 VITALS — SYSTOLIC BLOOD PRESSURE: 120 MMHG | DIASTOLIC BLOOD PRESSURE: 57 MMHG

## 2018-03-12 VITALS — DIASTOLIC BLOOD PRESSURE: 64 MMHG | SYSTOLIC BLOOD PRESSURE: 138 MMHG

## 2018-03-12 VITALS — SYSTOLIC BLOOD PRESSURE: 133 MMHG | DIASTOLIC BLOOD PRESSURE: 54 MMHG

## 2018-03-12 VITALS — SYSTOLIC BLOOD PRESSURE: 130 MMHG | DIASTOLIC BLOOD PRESSURE: 68 MMHG

## 2018-03-12 VITALS — DIASTOLIC BLOOD PRESSURE: 57 MMHG | SYSTOLIC BLOOD PRESSURE: 131 MMHG

## 2018-03-12 VITALS — SYSTOLIC BLOOD PRESSURE: 109 MMHG | DIASTOLIC BLOOD PRESSURE: 49 MMHG

## 2018-03-12 VITALS — SYSTOLIC BLOOD PRESSURE: 115 MMHG | DIASTOLIC BLOOD PRESSURE: 52 MMHG

## 2018-03-12 VITALS — DIASTOLIC BLOOD PRESSURE: 67 MMHG | SYSTOLIC BLOOD PRESSURE: 111 MMHG

## 2018-03-12 VITALS — DIASTOLIC BLOOD PRESSURE: 48 MMHG | SYSTOLIC BLOOD PRESSURE: 117 MMHG

## 2018-03-12 VITALS — DIASTOLIC BLOOD PRESSURE: 58 MMHG | SYSTOLIC BLOOD PRESSURE: 127 MMHG

## 2018-03-12 VITALS — SYSTOLIC BLOOD PRESSURE: 122 MMHG | DIASTOLIC BLOOD PRESSURE: 54 MMHG

## 2018-03-12 VITALS — DIASTOLIC BLOOD PRESSURE: 51 MMHG | SYSTOLIC BLOOD PRESSURE: 122 MMHG

## 2018-03-12 VITALS — DIASTOLIC BLOOD PRESSURE: 51 MMHG | SYSTOLIC BLOOD PRESSURE: 114 MMHG

## 2018-03-12 VITALS — SYSTOLIC BLOOD PRESSURE: 102 MMHG | DIASTOLIC BLOOD PRESSURE: 53 MMHG

## 2018-03-12 VITALS — DIASTOLIC BLOOD PRESSURE: 55 MMHG | SYSTOLIC BLOOD PRESSURE: 128 MMHG

## 2018-03-12 VITALS — SYSTOLIC BLOOD PRESSURE: 91 MMHG | DIASTOLIC BLOOD PRESSURE: 62 MMHG

## 2018-03-12 VITALS — DIASTOLIC BLOOD PRESSURE: 54 MMHG | SYSTOLIC BLOOD PRESSURE: 109 MMHG

## 2018-03-12 VITALS — DIASTOLIC BLOOD PRESSURE: 51 MMHG | SYSTOLIC BLOOD PRESSURE: 117 MMHG

## 2018-03-12 VITALS — SYSTOLIC BLOOD PRESSURE: 125 MMHG | DIASTOLIC BLOOD PRESSURE: 56 MMHG

## 2018-03-12 LAB
ADD MANUAL DIFF: NO
ALBUMIN SERPL-MCNC: 1.5 G/DL (ref 3.4–5)
ALBUMIN/GLOB SERPL: 0.3 {RATIO} (ref 1–2.7)
ALP SERPL-CCNC: 80 U/L (ref 46–116)
ALT SERPL-CCNC: 15 U/L (ref 12–78)
ANION GAP SERPL CALC-SCNC: 3 MMOL/L (ref 5–15)
APPEARANCE UR: (no result)
APTT PPP: YELLOW S
AST SERPL-CCNC: 10 U/L (ref 15–37)
BASOPHILS NFR BLD AUTO: 0.9 % (ref 0–2)
BILIRUB SERPL-MCNC: 0.3 MG/DL (ref 0.2–1)
BUN SERPL-MCNC: 49 MG/DL (ref 7–18)
CALCIUM SERPL-MCNC: 10.1 MG/DL (ref 8.5–10.1)
CHLORIDE SERPL-SCNC: 116 MMOL/L (ref 98–107)
CO2 SERPL-SCNC: 36 MMOL/L (ref 21–32)
CREAT SERPL-MCNC: 1.4 MG/DL (ref 0.55–1.3)
EOSINOPHIL NFR BLD AUTO: 2.4 % (ref 0–3)
ERYTHROCYTE [DISTWIDTH] IN BLOOD BY AUTOMATED COUNT: 13.3 % (ref 11.6–14.8)
GLOBULIN SER-MCNC: 5.3 G/DL
GLUCOSE UR STRIP-MCNC: NEGATIVE MG/DL
HCT VFR BLD CALC: 36.5 % (ref 42–52)
HGB BLD-MCNC: 11.8 G/DL (ref 14.2–18)
KETONES UR QL STRIP: NEGATIVE
LEUKOCYTE ESTERASE UR QL STRIP: (no result)
LYMPHOCYTES NFR BLD AUTO: 10.2 % (ref 20–45)
MCV RBC AUTO: 100 FL (ref 80–99)
MONOCYTES NFR BLD AUTO: 6.9 % (ref 1–10)
NEUTROPHILS NFR BLD AUTO: 79.7 % (ref 45–75)
NITRITE UR QL STRIP: NEGATIVE
PH UR STRIP: 5 [PH] (ref 4.5–8)
PHOSPHATE SERPL-MCNC: 3.2 MG/DL (ref 2.5–4.9)
PLATELET # BLD: 165 K/UL (ref 150–450)
POTASSIUM SERPL-SCNC: 4.2 MMOL/L (ref 3.5–5.1)
PROT UR QL STRIP: (no result)
RBC # BLD AUTO: 3.65 M/UL (ref 4.7–6.1)
SODIUM SERPL-SCNC: 155 MMOL/L (ref 136–145)
SP GR UR STRIP: 1.01 (ref 1–1.03)
UROBILINOGEN UR-MCNC: NORMAL MG/DL (ref 0–1)
WBC # BLD AUTO: 7 K/UL (ref 4.8–10.8)

## 2018-03-12 RX ADMIN — METOCLOPRAMIDE HYDROCHLORIDE SCH MG: 5 SOLUTION ORAL at 06:27

## 2018-03-12 RX ADMIN — LEVALBUTEROL HYDROCHLORIDE SCH MG: 1.25 SOLUTION, CONCENTRATE RESPIRATORY (INHALATION) at 07:32

## 2018-03-12 RX ADMIN — VANCOMYCIN HCL-SODIUM CHLORIDE IV SOLN 1.25 GM/250ML-0.9% SCH MLS/HR: 1.25-0.9/25 SOLUTION at 22:28

## 2018-03-12 RX ADMIN — INSULIN ASPART SCH UNITS: 100 INJECTION, SOLUTION INTRAVENOUS; SUBCUTANEOUS at 23:44

## 2018-03-12 RX ADMIN — DILTIAZEM HYDROCHLORIDE SCH MG: 60 TABLET, FILM COATED ORAL at 22:28

## 2018-03-12 RX ADMIN — DOCUSATE SODIUM SCH MG: 50 LIQUID ORAL at 18:21

## 2018-03-12 RX ADMIN — DIGOXIN SCH MG: 0.12 TABLET ORAL at 08:10

## 2018-03-12 RX ADMIN — FUROSEMIDE SCH MG: 40 TABLET ORAL at 08:10

## 2018-03-12 RX ADMIN — INSULIN ASPART SCH UNITS: 100 INJECTION, SOLUTION INTRAVENOUS; SUBCUTANEOUS at 13:23

## 2018-03-12 RX ADMIN — INSULIN ASPART SCH UNITS: 100 INJECTION, SOLUTION INTRAVENOUS; SUBCUTANEOUS at 06:31

## 2018-03-12 RX ADMIN — METOCLOPRAMIDE HYDROCHLORIDE SCH MG: 5 SOLUTION ORAL at 18:21

## 2018-03-12 RX ADMIN — MEROPENEM SCH MLS/HR: 1 INJECTION INTRAVENOUS at 02:21

## 2018-03-12 RX ADMIN — ASPIRIN 81 MG SCH MG: 81 TABLET ORAL at 08:09

## 2018-03-12 RX ADMIN — DOCUSATE SODIUM SCH MG: 50 LIQUID ORAL at 13:22

## 2018-03-12 RX ADMIN — DILTIAZEM HYDROCHLORIDE SCH MG: 60 TABLET, FILM COATED ORAL at 06:28

## 2018-03-12 RX ADMIN — INSULIN DETEMIR SCH UNITS: 100 INJECTION, SOLUTION SUBCUTANEOUS at 08:12

## 2018-03-12 RX ADMIN — METOPROLOL TARTRATE SCH MG: 25 TABLET, FILM COATED ORAL at 20:35

## 2018-03-12 RX ADMIN — METOPROLOL TARTRATE SCH MG: 25 TABLET, FILM COATED ORAL at 08:10

## 2018-03-12 RX ADMIN — METOCLOPRAMIDE HYDROCHLORIDE SCH MG: 5 SOLUTION ORAL at 13:22

## 2018-03-12 RX ADMIN — INSULIN DETEMIR SCH UNITS: 100 INJECTION, SOLUTION SUBCUTANEOUS at 20:37

## 2018-03-12 RX ADMIN — ACETAMINOPHEN PRN MG: 160 SOLUTION ORAL at 10:34

## 2018-03-12 RX ADMIN — SITAGLIPTIN SCH MG: 50 TABLET, FILM COATED ORAL at 08:10

## 2018-03-12 RX ADMIN — LEVALBUTEROL HYDROCHLORIDE SCH MG: 1.25 SOLUTION, CONCENTRATE RESPIRATORY (INHALATION) at 12:40

## 2018-03-12 RX ADMIN — LEVALBUTEROL HYDROCHLORIDE SCH MG: 1.25 SOLUTION, CONCENTRATE RESPIRATORY (INHALATION) at 21:38

## 2018-03-12 RX ADMIN — METOCLOPRAMIDE HYDROCHLORIDE SCH MG: 5 SOLUTION ORAL at 23:43

## 2018-03-12 RX ADMIN — DOCUSATE SODIUM SCH MG: 50 LIQUID ORAL at 08:09

## 2018-03-12 RX ADMIN — INSULIN ASPART SCH UNITS: 100 INJECTION, SOLUTION INTRAVENOUS; SUBCUTANEOUS at 18:22

## 2018-03-12 RX ADMIN — DILTIAZEM HYDROCHLORIDE SCH MG: 60 TABLET, FILM COATED ORAL at 14:08

## 2018-03-12 RX ADMIN — MEROPENEM SCH MLS/HR: 1 INJECTION INTRAVENOUS at 14:08

## 2018-03-12 NOTE — DIAGNOSTIC IMAGING REPORT
Indication: Dyspnea

 

Technique: One view of the chest

 

Comparison: 3/11/2018

 

Findings: Infiltrates in the left upper lobe, right infrahilar region and left lung

base persists. There is decreased opacity in the right upper lobe and left midlung.

The heart size is normal. The pleural spaces are clear

 

Impression: Overall slightly improved distal extensive bilateral infiltrates, as

described

## 2018-03-12 NOTE — PULMONOLGY CRITICAL CARE NOTE
Critical Care - Asmt/Plan


Problems:  


(1) Rapid atrial fibrillation


(2) Acute respiratory failure with hypoxia


(3) Sepsis


(4) Diabetes mellitus


(5) Functional quadriplegia


(6) Feeding by G-tube


Respiratory:  monitor respiratory rate, adjust FIO2, CXR


Cardiac:  continue to monitor HR/BP, other - on digoxin, cardizem, metoprolol


Renal:  F/U I&O


Infectious Disease:  check cultures, continue antibiotics, add antibiotics


Endocrine:  check HgA1C, continue sliding scale insulin


Hematologic:  transfuse if hgb<8.5


Neurologic:  PRN Ativan, keep patient comfortable


Prophylaxis:  Protonix


Disposition:  keep in ICU


Notes Reviewed:  cardio, renal


Discussed with:  nurses, consultants, , family member





Critical Care - Objective





Last 24 Hour Vital Signs








  Date Time  Temp Pulse Resp B/P (MAP) Pulse Ox O2 Delivery O2 Flow Rate FiO2


 


3/12/18 11:01  105 31 120/57 100 Non-Rebreather 15.0 


 


3/12/18 11:00  116 32 91/62 100 Non-Rebreather 15.0 


 


3/12/18 10:00  116 27 115/52 99 Non-Rebreather 15.0 


 


3/12/18 09:00  116 27 130/68 99 Non-Rebreather 15.0 


 


3/12/18 08:10  110      


 


3/12/18 08:10  110  117/51    


 


3/12/18 08:00  109      


 


3/12/18 08:00 98.8 109 27 117/51 100 Non-Rebreather 15.0 





 98.8       


 


3/12/18 07:42  114 27  97 Non-Rebreather 15.0 100


 


3/12/18 07:32      Non-Rebreather 15.0 100


 


3/12/18 07:32  98 28  98 Non-Rebreather 15.0 100


 


3/12/18 07:32     99 Non-Rebreather 15.0 100


 


3/12/18 07:00  112 27 109/54 100 Non-Rebreather 15.0 


 


3/12/18 06:28  118  118/67    


 


3/12/18 06:00  111 25 111/67 99 Non-Rebreather 15.0 


 


3/12/18 05:00  106 25 131/57 99 Non-Rebreather 15.0 


 


3/12/18 04:00  114      


 


3/12/18 04:00 98.9 108 26 130/56 100 Non-Rebreather 15.0 





 98.9       


 


3/12/18 03:00  98 25 130/56 100 Non-Rebreather 15.0 


 


3/12/18 02:00  95 26 125/56 100 Non-Rebreather 15.0 


 


3/12/18 01:00  91 25 122/51 100 Non-Rebreather 15.0 


 


3/12/18 00:00 99.0 89 26 132/52 100 Non-Rebreather 15.0 





 99.0       


 


3/12/18 00:00  95      


 


3/11/18 23:00  85 27 138/56 99 Non-Rebreather 15.0 


 


3/11/18 22:00  79 28 126/52 97 Non-Rebreather 15.0 


 


3/11/18 21:54  80  122/51    


 


3/11/18 21:00 99.0 106 28 136/55 97 Non-Rebreather 15.0 





 99.0       


 


3/11/18 20:57  106  136/55    


 


3/11/18 20:00 99.0 106 28 136/55 97 Non-Rebreather 15.0 





 99.0       


 


3/11/18 20:00  100      


 


3/11/18 19:29  96 30  97 Non-Rebreather 15.0 100


 


3/11/18 19:22  97 28  97 Non-Rebreather 15.0 100


 


3/11/18 19:00      Non-Rebreather 15.0 100


 


3/11/18 19:00     99 Non-Rebreather 15.0 100


 


3/11/18 19:00  98 29 117/51 99 Non-Rebreather 15.0 


 


3/11/18 18:00  92 27 114/47 99 Non-Rebreather 15.0 


 


3/11/18 17:00  90 26 106/46 98 Non-Rebreather 15.0 


 


3/11/18 16:00 98.9 87 24 103/46 99 Non-Rebreather 15.0 





 98.9       


 


3/11/18 16:00  95      


 


3/11/18 15:00  97 26 121/51 98 Non-Rebreather 15.0 


 


3/11/18 14:55  96  123/49    


 


3/11/18 14:30  100 27 117/60 95 Non-Rebreather 15.0 


 


3/11/18 14:00  102 32 114/47 98 Non-Rebreather 15.0 


 


3/11/18 13:30  96 29 99/49 98 Non-Rebreather 15.0 


 


3/11/18 13:19  97 32  96 Non-Rebreather 15.0 100


 


3/11/18 13:06  87 30  96 Non-Rebreather  100


 


3/11/18 13:00  94 28 114/48 99 Non-Rebreather 15.0 


 


3/11/18 12:30  93 27 105/50 98 Non-Rebreather 15.0 


 


3/11/18 12:00 100.3 92 28 104/47 99 Non-Rebreather 15.0 





 100.3       


 


3/11/18 12:00  93      








Status:  awake


Condition:  critical


HEENT:  atraumatic


Neck:  full ROM


Lungs:  clear


Heart:  HR/BP stable


Abdomen:  soft, non-tender


Extremities:  no C/C/E, edema


Decubiti:  location


Accucheck:  197





Critical Care - Subjective


ROS Limited/Unobtainable:  No


ICU Day:  2


Intubation Day:  2


Condition:  critical


EKG Rhythm:  Sinus Rhythm


FI02:  100


Sputum Amount:  None


Tube Feeding Amount:  55


I&O:











Intake and Output  


 


 3/11/18 3/12/18





 19:00 07:00


 


Intake Total 970 ml 1065.000 ml


 


Output Total 640 ml 690 ml


 


Balance 330 ml 375.000 ml


 


  


 


Free Water  100 ml


 


IV Total 160 ml 305.000 ml


 


Tube Feeding 660 ml 660 ml


 


Other 150 ml 


 


Output Urine Total 640 ml 690 ml








CXR:


bilateral infiltrate


Labs:





Laboratory Tests








Test


  3/12/18


04:00 3/12/18


05:00


 


Arterial Blood pH


  7.450


(7.350-7.450) 


 


 


Arterial Blood Partial


Pressure CO2 52.2 mmHg


(35.0-45.0)  H 


 


 


Arterial Blood Partial


Pressure O2 110.6 mmHg


(75.0-100.0)  H 


 


 


Arterial Blood HCO3


  36.2 mmol/L


(22.0-26.0)  H 


 


 


Arterial Blood Oxygen


Saturation 97.9 %


(92.0-98.0) 


 


 


Arterial Blood Base Excess 10.7   


 


Hernan Test Positive   


 


White Blood Count


  


  7.0 K/UL


(4.8-10.8)


 


Red Blood Count


  


  3.65 M/UL


(4.70-6.10)  L


 


Hemoglobin


  


  11.8 G/DL


(14.2-18.0)  L


 


Hematocrit


  


  36.5 %


(42.0-52.0)  L


 


Mean Corpuscular Volume


  


  100 FL (80-99)


H


 


Mean Corpuscular Hemoglobin


  


  32.3 PG


(27.0-31.0)  H


 


Mean Corpuscular Hemoglobin


Concent 


  32.3 G/DL


(32.0-36.0)


 


Red Cell Distribution Width


  


  13.3 %


(11.6-14.8)


 


Platelet Count


  


  165 K/UL


(150-450)


 


Mean Platelet Volume


  


  8.4 FL


(6.5-10.1)


 


Neutrophils (%) (Auto)


  


  79.7 %


(45.0-75.0)  H


 


Lymphocytes (%) (Auto)


  


  10.2 %


(20.0-45.0)  L


 


Monocytes (%) (Auto)


  


  6.9 %


(1.0-10.0)


 


Eosinophils (%) (Auto)


  


  2.4 %


(0.0-3.0)


 


Basophils (%) (Auto)


  


  0.9 %


(0.0-2.0)


 


Sodium Level


  


  155 MMOL/L


(136-145)  H


 


Potassium Level


  


  4.2 MMOL/L


(3.5-5.1)


 


Chloride Level


  


  116 MMOL/L


()  H


 


Carbon Dioxide Level


  


  36 MMOL/L


(21-32)  H


 


Anion Gap


  


  3 mmol/L


(5-15)  L


 


Blood Urea Nitrogen


  


  49 mg/dL


(7-18)  H


 


Creatinine


  


  1.4 MG/DL


(0.55-1.30)  H


 


Estimat Glomerular Filtration


Rate 


   mL/min (>60)  


 


 


Glucose Level


  


  193 MG/DL


()  #H


 


Calcium Level


  


  10.1 MG/DL


(8.5-10.1)


 


Phosphorus Level


  


  3.2 MG/DL


(2.5-4.9)


 


Magnesium Level


  


  2.2 MG/DL


(1.8-2.4)


 


Total Bilirubin


  


  0.3 MG/DL


(0.2-1.0)


 


Aspartate Amino Transf


(AST/SGOT) 


  10 U/L (15-37)


L


 


Alanine Aminotransferase


(ALT/SGPT) 


  15 U/L (12-78)


 


 


Alkaline Phosphatase


  


  80 U/L


()


 


Total Protein


  


  6.8 G/DL


(6.4-8.2)


 


Albumin


  


  1.5 G/DL


(3.4-5.0)  L


 


Globulin  5.3 g/dL  


 


Albumin/Globulin Ratio


  


  0.3 (1.0-2.7)


L

















JOAN DEMARCO Mar 12, 2018 11:44

## 2018-03-12 NOTE — CARDIAC ELECTROPHYSIOLOGY PN
Assessment/Plan


Assessment/Plan


1. Atrial fibrillation with rapid ventricular response.  This is due to the 

patient's respiratory failure and pneumonia. 


   Got total 0.25 iv digoxin and now on Cardizem 60 q 8, digoxin 0.25 mg daily 

and Lopressor 25 bid


   Echocardiogram showed ejection fraction of 60%.


2. Hypertension. On metoprolol and Cardizem.


3. Healthcare-associated pneumonia.  Sputum culture grew multidrug


resistant Providencia.  IV antibiotic per Dr. Gross.


4. Urinary tract infection and sepsis.


5. Respiratory failure, on face mask.  He is Do Not Intubate.


6. Diabetes.


7. Dysphagia, status post PEG





DW RN and Dr Schilling


Made DNR and DNI now





Subjective


Subjective


In ICU. Off Cardizem since midnight. -120 in sinus tach. Also had fever.





Objective





Last 24 Hour Vital Signs








  Date Time  Temp Pulse Resp B/P (MAP) Pulse Ox O2 Delivery O2 Flow Rate FiO2


 


3/12/18 14:08  124  111/55    


 


3/12/18 14:00  116 34 109/49 97 Non-Rebreather 15.0 


 


3/12/18 13:00  120 32 114/51 100 Non-Rebreather 15.0 


 


3/12/18 12:51  114 30  95 Non-Rebreather 15.0 100


 


3/12/18 12:40  110 31  99 Non-Rebreather 15.0 100


 


3/12/18 12:00 98.6 106 35 125/62 100 Non-Rebreather 15.0 





 98.6       


 


3/12/18 12:00  119      


 


3/12/18 11:01  105 31 120/57 100 Non-Rebreather 15.0 


 


3/12/18 11:00  116 32 91/62 100 Non-Rebreather 15.0 


 


3/12/18 10:00  116 27 115/52 99 Non-Rebreather 15.0 


 


3/12/18 09:00  116 27 130/68 99 Non-Rebreather 15.0 


 


3/12/18 08:10  110      


 


3/12/18 08:10  110  117/51    


 


3/12/18 08:00  109      


 


3/12/18 08:00 98.8 109 27 117/51 100 Non-Rebreather 15.0 





 98.8       


 


3/12/18 07:42  114 27  97 Non-Rebreather 15.0 100


 


3/12/18 07:32      Non-Rebreather 15.0 100


 


3/12/18 07:32  98 28  98 Non-Rebreather 15.0 100


 


3/12/18 07:32     99 Non-Rebreather 15.0 100


 


3/12/18 07:00  112 27 109/54 100 Non-Rebreather 15.0 


 


3/12/18 06:28  118  118/67    


 


3/12/18 06:00  111 25 111/67 99 Non-Rebreather 15.0 


 


3/12/18 05:00  106 25 131/57 99 Non-Rebreather 15.0 


 


3/12/18 04:00  114      


 


3/12/18 04:00 98.9 108 26 130/56 100 Non-Rebreather 15.0 





 98.9       


 


3/12/18 03:00  98 25 130/56 100 Non-Rebreather 15.0 


 


3/12/18 02:00  95 26 125/56 100 Non-Rebreather 15.0 


 


3/12/18 01:00  91 25 122/51 100 Non-Rebreather 15.0 


 


3/12/18 00:00 99.0 89 26 132/52 100 Non-Rebreather 15.0 





 99.0       


 


3/12/18 00:00  95      


 


3/11/18 23:00  85 27 138/56 99 Non-Rebreather 15.0 


 


3/11/18 22:00  79 28 126/52 97 Non-Rebreather 15.0 


 


3/11/18 21:54  80  122/51    


 


3/11/18 21:00 99.0 106 28 136/55 97 Non-Rebreather 15.0 





 99.0       


 


3/11/18 20:57  106  136/55    


 


3/11/18 20:00 99.0 106 28 136/55 97 Non-Rebreather 15.0 





 99.0       


 


3/11/18 20:00  100      


 


3/11/18 19:29  96 30  97 Non-Rebreather 15.0 100


 


3/11/18 19:22  97 28  97 Non-Rebreather 15.0 100


 


3/11/18 19:00      Non-Rebreather 15.0 100


 


3/11/18 19:00     99 Non-Rebreather 15.0 100


 


3/11/18 19:00  98 29 117/51 99 Non-Rebreather 15.0 


 


3/11/18 18:00  92 27 114/47 99 Non-Rebreather 15.0 


 


3/11/18 17:00  90 26 106/46 98 Non-Rebreather 15.0 


 


3/11/18 16:00 98.9 87 24 103/46 99 Non-Rebreather 15.0 





 98.9       


 


3/11/18 16:00  95      


 


3/11/18 15:00  97 26 121/51 98 Non-Rebreather 15.0 


 


3/11/18 14:55  96  123/49    

















Intake and Output  


 


 3/11/18 3/12/18





 19:00 07:00


 


Intake Total 970 ml 1065.000 ml


 


Output Total 640 ml 690 ml


 


Balance 330 ml 375.000 ml


 


  


 


Free Water  100 ml


 


IV Total 160 ml 305.000 ml


 


Tube Feeding 660 ml 660 ml


 


Other 150 ml 


 


Output Urine Total 640 ml 690 ml











Laboratory Tests








Test


  3/12/18


04:00 3/12/18


05:00


 


Arterial Blood pH


  7.450


(7.350-7.450) 


 


 


Arterial Blood Partial


Pressure CO2 52.2 mmHg


(35.0-45.0)  H 


 


 


Arterial Blood Partial


Pressure O2 110.6 mmHg


(75.0-100.0)  H 


 


 


Arterial Blood HCO3


  36.2 mmol/L


(22.0-26.0)  H 


 


 


Arterial Blood Oxygen


Saturation 97.9 %


(92.0-98.0) 


 


 


Arterial Blood Base Excess 10.7   


 


Hernan Test Positive   


 


White Blood Count


  


  7.0 K/UL


(4.8-10.8)


 


Red Blood Count


  


  3.65 M/UL


(4.70-6.10)  L


 


Hemoglobin


  


  11.8 G/DL


(14.2-18.0)  L


 


Hematocrit


  


  36.5 %


(42.0-52.0)  L


 


Mean Corpuscular Volume


  


  100 FL (80-99)


H


 


Mean Corpuscular Hemoglobin


  


  32.3 PG


(27.0-31.0)  H


 


Mean Corpuscular Hemoglobin


Concent 


  32.3 G/DL


(32.0-36.0)


 


Red Cell Distribution Width


  


  13.3 %


(11.6-14.8)


 


Platelet Count


  


  165 K/UL


(150-450)


 


Mean Platelet Volume


  


  8.4 FL


(6.5-10.1)


 


Neutrophils (%) (Auto)


  


  79.7 %


(45.0-75.0)  H


 


Lymphocytes (%) (Auto)


  


  10.2 %


(20.0-45.0)  L


 


Monocytes (%) (Auto)


  


  6.9 %


(1.0-10.0)


 


Eosinophils (%) (Auto)


  


  2.4 %


(0.0-3.0)


 


Basophils (%) (Auto)


  


  0.9 %


(0.0-2.0)


 


Sodium Level


  


  155 MMOL/L


(136-145)  H


 


Potassium Level


  


  4.2 MMOL/L


(3.5-5.1)


 


Chloride Level


  


  116 MMOL/L


()  H


 


Carbon Dioxide Level


  


  36 MMOL/L


(21-32)  H


 


Anion Gap


  


  3 mmol/L


(5-15)  L


 


Blood Urea Nitrogen


  


  49 mg/dL


(7-18)  H


 


Creatinine


  


  1.4 MG/DL


(0.55-1.30)  H


 


Estimat Glomerular Filtration


Rate 


   mL/min (>60)  


 


 


Glucose Level


  


  193 MG/DL


()  #H


 


Calcium Level


  


  10.1 MG/DL


(8.5-10.1)


 


Phosphorus Level


  


  3.2 MG/DL


(2.5-4.9)


 


Magnesium Level


  


  2.2 MG/DL


(1.8-2.4)


 


Total Bilirubin


  


  0.3 MG/DL


(0.2-1.0)


 


Aspartate Amino Transf


(AST/SGOT) 


  10 U/L (15-37)


L


 


Alanine Aminotransferase


(ALT/SGPT) 


  15 U/L (12-78)


 


 


Alkaline Phosphatase


  


  80 U/L


()


 


Total Protein


  


  6.8 G/DL


(6.4-8.2)


 


Albumin


  


  1.5 G/DL


(3.4-5.0)  L


 


Globulin  5.3 g/dL  


 


Albumin/Globulin Ratio


  


  0.3 (1.0-2.7)


L








Objective


HEAD AND NECK:  No JVD. Face mask on


LUNGS:  Coarse rhonchi bilaterally.


CARDIOVASCULAR:  Irregularly irregular, tachycardic.  


    S1 and S2 with no gallop or murmur.


ABDOMEN:  Soft.  Status post PEG.


EXTREMITIES:  No pitting edema.











LAI REA Mar 12, 2018 14:59

## 2018-03-12 NOTE — INFECTIOUS DISEASES PROG NOTE
Assessment/Plan


Problems:  


(1) HCAP (healthcare-associated pneumonia)


Assessment & Plan:  not improving much , with sputum culture grew MDR 

Providencia stuartii , now on meropenem to broaden his coverage , and  

vancomycin empiric coverage for MRSA . continue  aspiration precaution with 

suctioning . monitor cxr . prognosis is poor , D/W POA , and his nephew .





(2) UTI (urinary tract infection)


Assessment & Plan:  no urine culture was collected , already on meropenem which

  covers  his UTI 





(3) Sepsis


Assessment & Plan:  due to the above, continue vancomycin and meropenem ,  

initial blood culture remains negative, will repeat blood culture today   





(4) Acute respiratory failure


Assessment & Plan:  due to the above , not improving, on NRM with high flow 

oxygen , continue inhalers and high flow oxygen, monitor CXR  





(5) Diabetes mellitus


Assessment & Plan:  recommend tight glycemic control to keep blood glucose 

between 100-140 





(6) Fever


Assessment & Plan:  due to the above,  improved, continue wide spectrum 

antibiotics , and Tylenol PRN 








Subjective


ROS Limited/Unobtainable:  Yes


Allergies:  


Coded Allergies:  


     No Known Allergies (Unverified , 3/23/16)


Subjective


he was lying  in bed,  lethargic , unresponsive , still on high flow oxygen via 

mask , comfortable, no fever or chills , no SOB, with wheezing  sounds, no 

diarrhea





Objective


Vital Signs





Last 24 Hour Vital Signs








  Date Time  Temp Pulse Resp B/P (MAP) Pulse Ox O2 Delivery O2 Flow Rate FiO2


 


3/12/18 15:00  120 30 106/55 100 Non-Rebreather 15.0 


 


3/12/18 15:00  106 34 102/53 97 Non-Rebreather 15.0 


 


3/12/18 14:08  124  111/55    


 


3/12/18 14:00  116 34 109/49 97 Non-Rebreather 15.0 


 


3/12/18 13:00  120 32 114/51 100 Non-Rebreather 15.0 


 


3/12/18 12:51  114 30  95 Non-Rebreather 15.0 100


 


3/12/18 12:40  110 31  99 Non-Rebreather 15.0 100


 


3/12/18 12:00 98.6 106 35 125/62 100 Non-Rebreather 15.0 





 98.6       


 


3/12/18 12:00  119      


 


3/12/18 11:01  105 31 120/57 100 Non-Rebreather 15.0 


 


3/12/18 11:00  116 32 91/62 100 Non-Rebreather 15.0 


 


3/12/18 10:00  116 27 115/52 99 Non-Rebreather 15.0 


 


3/12/18 09:00  116 27 130/68 99 Non-Rebreather 15.0 


 


3/12/18 08:10  110      


 


3/12/18 08:10  110  117/51    


 


3/12/18 08:00  109      


 


3/12/18 08:00 98.8 109 27 117/51 100 Non-Rebreather 15.0 





 98.8       


 


3/12/18 07:42  114 27  97 Non-Rebreather 15.0 100


 


3/12/18 07:32      Non-Rebreather 15.0 100


 


3/12/18 07:32  98 28  98 Non-Rebreather 15.0 100


 


3/12/18 07:32     99 Non-Rebreather 15.0 100


 


3/12/18 07:00  112 27 109/54 100 Non-Rebreather 15.0 


 


3/12/18 06:28  118  118/67    


 


3/12/18 06:00  111 25 111/67 99 Non-Rebreather 15.0 


 


3/12/18 05:00  106 25 131/57 99 Non-Rebreather 15.0 


 


3/12/18 04:00  114      


 


3/12/18 04:00 98.9 108 26 130/56 100 Non-Rebreather 15.0 





 98.9       


 


3/12/18 03:00  98 25 130/56 100 Non-Rebreather 15.0 


 


3/12/18 02:00  95 26 125/56 100 Non-Rebreather 15.0 


 


3/12/18 01:00  91 25 122/51 100 Non-Rebreather 15.0 


 


3/12/18 00:00 99.0 89 26 132/52 100 Non-Rebreather 15.0 





 99.0       


 


3/12/18 00:00  95      


 


3/11/18 23:00  85 27 138/56 99 Non-Rebreather 15.0 


 


3/11/18 22:00  79 28 126/52 97 Non-Rebreather 15.0 


 


3/11/18 21:54  80  122/51    


 


3/11/18 21:00 99.0 106 28 136/55 97 Non-Rebreather 15.0 





 99.0       


 


3/11/18 20:57  106  136/55    


 


3/11/18 20:00 99.0 106 28 136/55 97 Non-Rebreather 15.0 





 99.0       


 


3/11/18 20:00  100      


 


3/11/18 19:29  96 30  97 Non-Rebreather 15.0 100


 


3/11/18 19:22  97 28  97 Non-Rebreather 15.0 100


 


3/11/18 19:00      Non-Rebreather 15.0 100


 


3/11/18 19:00     99 Non-Rebreather 15.0 100


 


3/11/18 19:00  98 29 117/51 99 Non-Rebreather 15.0 


 


3/11/18 18:00  92 27 114/47 99 Non-Rebreather 15.0 


 


3/11/18 17:00  90 26 106/46 98 Non-Rebreather 15.0 


 


3/11/18 16:00 98.9 87 24 103/46 99 Non-Rebreather 15.0 





 98.9       


 


3/11/18 16:00  95      








Height (Feet):  6


Height (Inches):  1.00


Weight (Pounds):  170


General Appearance:  WD/WN, no acute distress


HEENT:  normocephalic, atraumatic, anicteric, mucous membranes moist, supple, 

no JVD


Respiratory/Chest:  no respiratory distress, no accessory muscle use, decreased 

breath sounds, crackles/rales, expiratory wheezing


Cardiovascular:  normal peripheral pulses, normal rate, regular rhythm, no 

gallop/murmur, no JVD


Abdomen:  normal bowel sounds, soft, non tender, no organomegaly, non distended

, no mass, no scars


Extremities:  no cyanosis, no clubbing


Skin:  no rash, no lesions, no ulcers


Neurologic/Psychiatric:  unresponsiveness


Lymphatic:  no neck adenopathy, no groin adenopathy





Laboratory Tests








Test


  3/12/18


04:00 3/12/18


05:00


 


Arterial Blood pH


  7.450


(7.350-7.450) 


 


 


Arterial Blood Partial


Pressure CO2 52.2 mmHg


(35.0-45.0)  H 


 


 


Arterial Blood Partial


Pressure O2 110.6 mmHg


(75.0-100.0)  H 


 


 


Arterial Blood HCO3


  36.2 mmol/L


(22.0-26.0)  H 


 


 


Arterial Blood Oxygen


Saturation 97.9 %


(92.0-98.0) 


 


 


Arterial Blood Base Excess 10.7   


 


Hernan Test Positive   


 


White Blood Count


  


  7.0 K/UL


(4.8-10.8)


 


Red Blood Count


  


  3.65 M/UL


(4.70-6.10)  L


 


Hemoglobin


  


  11.8 G/DL


(14.2-18.0)  L


 


Hematocrit


  


  36.5 %


(42.0-52.0)  L


 


Mean Corpuscular Volume


  


  100 FL (80-99)


H


 


Mean Corpuscular Hemoglobin


  


  32.3 PG


(27.0-31.0)  H


 


Mean Corpuscular Hemoglobin


Concent 


  32.3 G/DL


(32.0-36.0)


 


Red Cell Distribution Width


  


  13.3 %


(11.6-14.8)


 


Platelet Count


  


  165 K/UL


(150-450)


 


Mean Platelet Volume


  


  8.4 FL


(6.5-10.1)


 


Neutrophils (%) (Auto)


  


  79.7 %


(45.0-75.0)  H


 


Lymphocytes (%) (Auto)


  


  10.2 %


(20.0-45.0)  L


 


Monocytes (%) (Auto)


  


  6.9 %


(1.0-10.0)


 


Eosinophils (%) (Auto)


  


  2.4 %


(0.0-3.0)


 


Basophils (%) (Auto)


  


  0.9 %


(0.0-2.0)


 


Sodium Level


  


  155 MMOL/L


(136-145)  H


 


Potassium Level


  


  4.2 MMOL/L


(3.5-5.1)


 


Chloride Level


  


  116 MMOL/L


()  H


 


Carbon Dioxide Level


  


  36 MMOL/L


(21-32)  H


 


Anion Gap


  


  3 mmol/L


(5-15)  L


 


Blood Urea Nitrogen


  


  49 mg/dL


(7-18)  H


 


Creatinine


  


  1.4 MG/DL


(0.55-1.30)  H


 


Estimat Glomerular Filtration


Rate 


   mL/min (>60)  


 


 


Glucose Level


  


  193 MG/DL


()  #H


 


Calcium Level


  


  10.1 MG/DL


(8.5-10.1)


 


Phosphorus Level


  


  3.2 MG/DL


(2.5-4.9)


 


Magnesium Level


  


  2.2 MG/DL


(1.8-2.4)


 


Total Bilirubin


  


  0.3 MG/DL


(0.2-1.0)


 


Aspartate Amino Transf


(AST/SGOT) 


  10 U/L (15-37)


L


 


Alanine Aminotransferase


(ALT/SGPT) 


  15 U/L (12-78)


 


 


Alkaline Phosphatase


  


  80 U/L


()


 


Total Protein


  


  6.8 G/DL


(6.4-8.2)


 


Albumin


  


  1.5 G/DL


(3.4-5.0)  L


 


Globulin  5.3 g/dL  


 


Albumin/Globulin Ratio


  


  0.3 (1.0-2.7)


L











Current Medications








 Medications


  (Trade)  Dose


 Ordered  Sig/Shasta


 Route


 PRN Reason  Start Time


 Stop Time Status Last Admin


Dose Admin


 


 Acetaminophen


  (Tylenol)  650 mg  Q6H  PRN


 GT


 Mild Pain/Temp > 100.5  3/7/18 12:15


 4/6/18 12:14  3/12/18 10:34


 


 


 Aspirin


  (ASA)  81 mg  DAILY


 GT


   3/8/18 09:00


 4/7/18 08:59  3/12/18 08:09


 


 


 Dextrose


  (Dextrose 50%)    STAT  PRN


 IV


 Hypoglycemia  3/11/18 14:30


 4/10/18 14:29   


 


 


 Digoxin


  (Lanoxin)  0.25 mg  DAILY


 PEG


   3/11/18 09:00


 4/10/18 08:59  3/12/18 08:10


 


 


 Diltiazem HCl


  (Cardizem)  60 mg  EVERY 8  HOURS


 PEG


   3/11/18 14:30


 4/10/18 14:29  3/12/18 14:08


 


 


 Docusate Sodium


  (Colace)  100 mg  TID


 GT


   3/7/18 13:00


 4/6/18 12:59  3/12/18 13:22


 


 


 Furosemide


  (Lasix)  40 mg  DAILY


 GT


   3/8/18 09:00


 4/7/18 08:59  3/12/18 08:10


 


 


 Insulin Aspart


  (NovoLOG)    EVERY 6  HOURS


 SUBQ


   3/11/18 18:00


 4/10/18 17:59  3/12/18 13:23


 


 


 Insulin Detemir


  (Levemir)  20 units  Q12HR


 SUBQ


   3/9/18 21:00


 4/7/18 08:59  3/12/18 08:12


 


 


 Levalbuterol HCl


  (Xopenex)  1.25 mg  TIDRT


 HHN


   3/9/18 08:00


 3/14/18 07:59  3/12/18 12:40


 


 


 Meropenem 1 gm/


 Sodium Chloride  55 ml @ 


 110 mls/hr  Q12HR@0200,1400


 IVPB


   3/11/18 14:00


 3/16/18 13:59  3/12/18 14:08


 


 


 Metoclopramide HCl


  (Reglan)  5 mg  EVERY 6  HOURS


 GT


   3/7/18 12:30


 4/6/18 12:29  3/12/18 13:22


 


 


 Metoprolol


 Tartrate


  (Lopressor)  25 mg  Q12HR


 ORAL


   3/10/18 21:00


 4/9/18 20:59  3/11/18 20:57


 


 


 Sitagliptin


 Phosphate


  (Januvia)  50 mg  DAILY


 GT


   3/8/18 09:00


 4/7/18 08:59  3/12/18 08:10


 


 


 Theophylline


  (Theophylline)  80 mg  Q8HR


 GT


   3/7/18 14:00


 4/6/18 13:59  3/12/18 14:07


 


 


 Vancomycin HCl


  (Vanco rx to


 dose)  1 ea  DAILY  PRN


 MISC


 Per rx protocol  3/7/18 12:00


 4/6/18 11:59   


 


 


 Vancomycin HCl/


 Dextrose  250 ml @ 


 166.667


 mls/hr  Q24H


 IVPB


   3/10/18 22:00


 3/15/18 21:59  3/11/18 21:53


 

















Amira Gross M.D. Mar 12, 2018 15:38

## 2018-03-12 NOTE — GENERAL PROGRESS NOTE
Assessment/Plan


Problem List:  


(1) Acute respiratory failure with hypoxia


Assessment & Plan:  Pneumonia


ICD Codes:  J96.01 - Acute respiratory failure with hypoxia


SNOMED:  40378225, 992405958


(2) Dementia


ICD Codes:  F03.90 - Unspecified dementia without behavioral disturbance


SNOMED:  63416956


Qualifiers:  


   Qualified Codes:  F03.90 - Unspecified dementia without behavioral 

disturbance


(3) Functional quadriplegia


ICD Codes:  R53.2 - Functional quadriplegia


SNOMED:  944898852703032


(4) Feeding by G-tube


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


SNOMED:  930407176, 289436695


(5) Atrial flutter with rapid ventricular response


ICD Codes:  I48.92 - Unspecified atrial flutter


SNOMED:  0764510


Status:  unchanged, deteriorating


Assessment/Plan


discussed with MERCEDES Guzman in dept-


agreed with hospice and comfort care


will transfer to med surg


DNR DNI


no further blood work.


to ECF in am


breathing treatment-


Antibiotics-


per orders





Subjective


ROS Limited/Unobtainable:  Yes


Allergies:  


Coded Allergies:  


     No Known Allergies (Unverified , 3/23/16)





Objective





Last 24 Hour Vital Signs








  Date Time  Temp Pulse Resp B/P (MAP) Pulse Ox O2 Delivery O2 Flow Rate FiO2


 


3/12/18 16:00  115      


 


3/12/18 16:00 97.6 118 34 133/54 97 Non-Rebreather 15.0 





 97.6       


 


3/12/18 15:00  120 30 106/55 100 Non-Rebreather 15.0 


 


3/12/18 15:00  106 34 102/53 97 Non-Rebreather 15.0 


 


3/12/18 14:08  124  111/55    


 


3/12/18 14:00  116 34 109/49 97 Non-Rebreather 15.0 


 


3/12/18 13:00  120 32 114/51 100 Non-Rebreather 15.0 


 


3/12/18 12:51  114 30  95 Non-Rebreather 15.0 100


 


3/12/18 12:40  110 31  99 Non-Rebreather 15.0 100


 


3/12/18 12:00 98.6 106 35 125/62 100 Non-Rebreather 15.0 





 98.6       


 


3/12/18 12:00  119      


 


3/12/18 11:01  105 31 120/57 100 Non-Rebreather 15.0 


 


3/12/18 11:00  116 32 91/62 100 Non-Rebreather 15.0 


 


3/12/18 10:00  116 27 115/52 99 Non-Rebreather 15.0 


 


3/12/18 09:00  116 27 130/68 99 Non-Rebreather 15.0 


 


3/12/18 08:10  110      


 


3/12/18 08:10  110  117/51    


 


3/12/18 08:00  109      


 


3/12/18 08:00 98.8 109 27 117/51 100 Non-Rebreather 15.0 





 98.8       


 


3/12/18 07:42  114 27  97 Non-Rebreather 15.0 100


 


3/12/18 07:32      Non-Rebreather 15.0 100


 


3/12/18 07:32  98 28  98 Non-Rebreather 15.0 100


 


3/12/18 07:32     99 Non-Rebreather 15.0 100


 


3/12/18 07:00  112 27 109/54 100 Non-Rebreather 15.0 


 


3/12/18 06:28  118  118/67    


 


3/12/18 06:00  111 25 111/67 99 Non-Rebreather 15.0 


 


3/12/18 05:00  106 25 131/57 99 Non-Rebreather 15.0 


 


3/12/18 04:00  114      


 


3/12/18 04:00 98.9 108 26 130/56 100 Non-Rebreather 15.0 





 98.9       


 


3/12/18 03:00  98 25 130/56 100 Non-Rebreather 15.0 


 


3/12/18 02:00  95 26 125/56 100 Non-Rebreather 15.0 


 


3/12/18 01:00  91 25 122/51 100 Non-Rebreather 15.0 


 


3/12/18 00:00 99.0 89 26 132/52 100 Non-Rebreather 15.0 





 99.0       


 


3/12/18 00:00  95      


 


3/11/18 23:00  85 27 138/56 99 Non-Rebreather 15.0 


 


3/11/18 22:00  79 28 126/52 97 Non-Rebreather 15.0 


 


3/11/18 21:54  80  122/51    


 


3/11/18 21:00 99.0 106 28 136/55 97 Non-Rebreather 15.0 





 99.0       


 


3/11/18 20:57  106  136/55    


 


3/11/18 20:00 99.0 106 28 136/55 97 Non-Rebreather 15.0 





 99.0       


 


3/11/18 20:00  100      


 


3/11/18 19:29  96 30  97 Non-Rebreather 15.0 100


 


3/11/18 19:22  97 28  97 Non-Rebreather 15.0 100


 


3/11/18 19:00      Non-Rebreather 15.0 100


 


3/11/18 19:00     99 Non-Rebreather 15.0 100


 


3/11/18 19:00  98 29 117/51 99 Non-Rebreather 15.0 


 


3/11/18 18:00  92 27 114/47 99 Non-Rebreather 15.0 

















Intake and Output  


 


 3/11/18 3/12/18





 19:00 07:00


 


Intake Total 970 ml 1065.000 ml


 


Output Total 640 ml 690 ml


 


Balance 330 ml 375.000 ml


 


  


 


Free Water  100 ml


 


IV Total 160 ml 305.000 ml


 


Tube Feeding 660 ml 660 ml


 


Other 150 ml 


 


Output Urine Total 640 ml 690 ml








Laboratory Tests


3/12/18 04:00: 


Arterial Blood pH 7.450, Arterial Blood Partial Pressure CO2 52.2H, Arterial 

Blood Partial Pressure O2 110.6H, Arterial Blood HCO3 36.2H, Arterial Blood 

Oxygen Saturation 97.9, Arterial Blood Base Excess 10.7, Hernan Test Positive


3/12/18 05:00: 


White Blood Count 7.0, Red Blood Count 3.65L, Hemoglobin 11.8L, Hematocrit 36.5L

, Mean Corpuscular Volume 100H, Mean Corpuscular Hemoglobin 32.3H, Mean 

Corpuscular Hemoglobin Concent 32.3, Red Cell Distribution Width 13.3, Platelet 

Count 165, Mean Platelet Volume 8.4, Neutrophils (%) (Auto) 79.7H, Lymphocytes (

%) (Auto) 10.2L, Monocytes (%) (Auto) 6.9, Eosinophils (%) (Auto) 2.4, 

Basophils (%) (Auto) 0.9, Sodium Level 155H, Potassium Level 4.2, Chloride 

Level 116H, Carbon Dioxide Level 36H, Anion Gap 3L, Blood Urea Nitrogen 49H, 

Creatinine 1.4H, Estimat Glomerular Filtration Rate , Glucose Level 193#H, 

Calcium Level 10.1, Phosphorus Level 3.2, Magnesium Level 2.2, Total Bilirubin 

0.3, Aspartate Amino Transf (AST/SGOT) 10L, Alanine Aminotransferase (ALT/SGPT) 

15, Alkaline Phosphatase 80, Total Protein 6.8, Albumin 1.5L, Globulin 5.3, 

Albumin/Globulin Ratio 0.3L


Height (Feet):  6


Height (Inches):  1.00


Weight (Pounds):  170


General Appearance:  lethargic


Cardiovascular:  bradycardia


Respiratory/Chest:  decreased breath sounds


Abdomen:  distended











NAY CALDWELL Mar 12, 2018 17:05

## 2018-03-13 VITALS — DIASTOLIC BLOOD PRESSURE: 47 MMHG | SYSTOLIC BLOOD PRESSURE: 104 MMHG

## 2018-03-13 VITALS — SYSTOLIC BLOOD PRESSURE: 106 MMHG | DIASTOLIC BLOOD PRESSURE: 47 MMHG

## 2018-03-13 VITALS — DIASTOLIC BLOOD PRESSURE: 49 MMHG | SYSTOLIC BLOOD PRESSURE: 116 MMHG

## 2018-03-13 VITALS — DIASTOLIC BLOOD PRESSURE: 44 MMHG | SYSTOLIC BLOOD PRESSURE: 111 MMHG

## 2018-03-13 VITALS — DIASTOLIC BLOOD PRESSURE: 48 MMHG | SYSTOLIC BLOOD PRESSURE: 110 MMHG

## 2018-03-13 VITALS — DIASTOLIC BLOOD PRESSURE: 44 MMHG | SYSTOLIC BLOOD PRESSURE: 113 MMHG

## 2018-03-13 VITALS — SYSTOLIC BLOOD PRESSURE: 105 MMHG | DIASTOLIC BLOOD PRESSURE: 48 MMHG

## 2018-03-13 VITALS — SYSTOLIC BLOOD PRESSURE: 107 MMHG | DIASTOLIC BLOOD PRESSURE: 46 MMHG

## 2018-03-13 VITALS — SYSTOLIC BLOOD PRESSURE: 105 MMHG | DIASTOLIC BLOOD PRESSURE: 41 MMHG

## 2018-03-13 VITALS — SYSTOLIC BLOOD PRESSURE: 107 MMHG | DIASTOLIC BLOOD PRESSURE: 44 MMHG

## 2018-03-13 VITALS — SYSTOLIC BLOOD PRESSURE: 106 MMHG | DIASTOLIC BLOOD PRESSURE: 40 MMHG

## 2018-03-13 VITALS — SYSTOLIC BLOOD PRESSURE: 116 MMHG | DIASTOLIC BLOOD PRESSURE: 48 MMHG

## 2018-03-13 VITALS — SYSTOLIC BLOOD PRESSURE: 103 MMHG | DIASTOLIC BLOOD PRESSURE: 43 MMHG

## 2018-03-13 VITALS — DIASTOLIC BLOOD PRESSURE: 55 MMHG | SYSTOLIC BLOOD PRESSURE: 108 MMHG

## 2018-03-13 VITALS — SYSTOLIC BLOOD PRESSURE: 117 MMHG | DIASTOLIC BLOOD PRESSURE: 46 MMHG

## 2018-03-13 VITALS — DIASTOLIC BLOOD PRESSURE: 47 MMHG | SYSTOLIC BLOOD PRESSURE: 113 MMHG

## 2018-03-13 VITALS — DIASTOLIC BLOOD PRESSURE: 45 MMHG | SYSTOLIC BLOOD PRESSURE: 101 MMHG

## 2018-03-13 VITALS — SYSTOLIC BLOOD PRESSURE: 103 MMHG | DIASTOLIC BLOOD PRESSURE: 41 MMHG

## 2018-03-13 RX ADMIN — DOCUSATE SODIUM SCH MG: 50 LIQUID ORAL at 13:30

## 2018-03-13 RX ADMIN — METOCLOPRAMIDE HYDROCHLORIDE SCH MG: 5 SOLUTION ORAL at 13:30

## 2018-03-13 RX ADMIN — METOCLOPRAMIDE HYDROCHLORIDE SCH MG: 5 SOLUTION ORAL at 05:36

## 2018-03-13 RX ADMIN — LEVALBUTEROL HYDROCHLORIDE SCH MG: 1.25 SOLUTION, CONCENTRATE RESPIRATORY (INHALATION) at 12:33

## 2018-03-13 RX ADMIN — MEROPENEM SCH MLS/HR: 1 INJECTION INTRAVENOUS at 13:31

## 2018-03-13 RX ADMIN — FUROSEMIDE SCH MG: 40 TABLET ORAL at 09:42

## 2018-03-13 RX ADMIN — MEROPENEM SCH MLS/HR: 1 INJECTION INTRAVENOUS at 01:33

## 2018-03-13 RX ADMIN — DOCUSATE SODIUM SCH MG: 50 LIQUID ORAL at 09:44

## 2018-03-13 RX ADMIN — LEVALBUTEROL HYDROCHLORIDE SCH MG: 1.25 SOLUTION, CONCENTRATE RESPIRATORY (INHALATION) at 07:12

## 2018-03-13 RX ADMIN — INSULIN DETEMIR SCH UNITS: 100 INJECTION, SOLUTION SUBCUTANEOUS at 09:45

## 2018-03-13 RX ADMIN — ASPIRIN 81 MG SCH MG: 81 TABLET ORAL at 09:43

## 2018-03-13 RX ADMIN — INSULIN ASPART SCH UNITS: 100 INJECTION, SOLUTION INTRAVENOUS; SUBCUTANEOUS at 13:33

## 2018-03-13 RX ADMIN — INSULIN ASPART SCH UNITS: 100 INJECTION, SOLUTION INTRAVENOUS; SUBCUTANEOUS at 05:39

## 2018-03-13 RX ADMIN — DIGOXIN SCH MG: 0.12 TABLET ORAL at 09:43

## 2018-03-13 RX ADMIN — DILTIAZEM HYDROCHLORIDE SCH MG: 60 TABLET, FILM COATED ORAL at 05:38

## 2018-03-13 RX ADMIN — DILTIAZEM HYDROCHLORIDE SCH MG: 60 TABLET, FILM COATED ORAL at 13:31

## 2018-03-13 RX ADMIN — METOPROLOL TARTRATE SCH MG: 25 TABLET, FILM COATED ORAL at 09:43

## 2018-03-13 RX ADMIN — SITAGLIPTIN SCH MG: 50 TABLET, FILM COATED ORAL at 09:42

## 2018-03-13 NOTE — DISCHARGE SUMMARY
Discharge Summary


Hospital Course


Date of Admission


Mar 7, 2018 at 08:15


Date of Discharge


Mar 13, 2018 at 17:30


Admitting Diagnosis


respiratory distress


HPI


Jolly Shaw is a 88 year old male who was admitted on Mar 7, 2018 at 08:15 for 

Respiratory Distress


Hospital Course


[id number]





Discharge


Discharge Disposition


Patient was discharged to SNF/Subacute Facility(03)


Discharge Diagnoses:  











Jojo Harrington NP Mar 13, 2018 18:30

## 2018-03-13 NOTE — PULMONOLGY CRITICAL CARE NOTE
Critical Care - Asmt/Plan


Problems:  


(1) Rapid atrial fibrillation


(2) Acute respiratory failure with hypoxia


(3) Sepsis


(4) Diabetes mellitus


(5) Functional quadriplegia


(6) Feeding by G-tube


Respiratory:  monitor respiratory rate, adjust FIO2


Cardiac:  continue to monitor HR/BP, other - heart rate is better than yesterday


Renal:  F/U I&O, keep IV fluid, check electrolytes


Infectious Disease:  check cultures, continue antibiotics


Gastrointestinal:  continue feedings/current rate


Endocrine:  monitor blood sugar


Hematologic:  monitor H/H, transfuse if hgb<8.5


Neurologic:  PRN Morphine, keep patient comfortable


Prophylaxis:  Protonix


Notes Reviewed:  cardio, renal


Discussed with:  nurses, consultants, 





Critical Care - Objective





Last 24 Hour Vital Signs








  Date Time  Temp Pulse Resp B/P (MAP) Pulse Ox O2 Delivery O2 Flow Rate FiO2


 


3/13/18 09:43  94      


 


3/13/18 09:43  94  114/50    


 


3/13/18 09:00  94 40 117/46 99 Non-Rebreather 15.0 100


 


3/13/18 08:00  89      


 


3/13/18 08:00 98.7 92 41 113/44 100 Non-Rebreather 15.0 100





 98.7       


 


3/13/18 07:18  89 30  97 Non-Rebreather 15.0 100


 


3/13/18 07:11  88 30  100 Non-Rebreather 15.0 100


 


3/13/18 07:11     88 Non-Rebreather 15.0 100


 


3/13/18 07:11      Non-Rebreather 15.0 100


 


3/13/18 07:00  86 41 106/40 100 Non-Rebreather 15.0 100


 


3/13/18 06:00  92 45 105/41 100 Non-Rebreather 15.0 100


 


3/13/18 05:38  101  111/45    


 


3/13/18 05:00  98 45 111/44 100 Non-Rebreather 15.0 100


 


3/13/18 04:06  94      


 


3/13/18 04:00 98.7 96 39 116/49 100 Non-Rebreather 15.0 100





 98.7       


 


3/13/18 03:00  93 45 116/48 98 Non-Rebreather 15.0 100


 


3/13/18 02:00  90 35 103/43 98 Non-Rebreather 15.0 100


 


3/13/18 01:00  81 34 107/44 97 Non-Rebreather 15.0 100


 


3/13/18 00:00 98.9 78 37 113/47 97 Non-Rebreather 15.0 100





 98.9       


 


3/12/18 23:56  79      


 


3/12/18 23:00  102 40 117/48 98 Non-Rebreather 15.0 100


 


3/12/18 22:28  104  129/60    


 


3/12/18 22:00  98 46 129/57 97 Non-Rebreather 15.0 100


 


3/12/18 21:00  98 40 138/64 92 Non-Rebreather 15.0 100


 


3/12/18 20:37  127      


 


3/12/18 20:35  123  143/63    


 


3/12/18 20:00 99.9 124 38 128/55 92 Non-Rebreather 15.0 100





 99.9       


 


3/12/18 19:21  99 33  96 Non-Rebreather 15.0 100


 


3/12/18 19:14  99 33  95 Non-Rebreather 15.0 100


 


3/12/18 19:02      Non-Rebreather 15.0 100


 


3/12/18 19:02     94 Non-Rebreather 15.0 100


 


3/12/18 19:00  118 15 128/55 98 Non-Rebreather 15.0 


 


3/12/18 18:00  124 33 127/58 96 Non-Rebreather 15.0 


 


3/12/18 17:00  119 30 122/54 98 Non-Rebreather 15.0 


 


3/12/18 16:00  115      


 


3/12/18 16:00 97.6 118 34 133/54 97 Non-Rebreather 15.0 





 97.6       


 


3/12/18 15:00  120 30 106/55 100 Non-Rebreather 15.0 


 


3/12/18 15:00  106 34 102/53 97 Non-Rebreather 15.0 


 


3/12/18 14:08  124  111/55    


 


3/12/18 14:00  116 34 109/49 97 Non-Rebreather 15.0 


 


3/12/18 13:00  120 32 114/51 100 Non-Rebreather 15.0 


 


3/12/18 12:51  114 30  95 Non-Rebreather 15.0 100


 


3/12/18 12:40  110 31  99 Non-Rebreather 15.0 100


 


3/12/18 12:00 98.6 106 35 125/62 100 Non-Rebreather 15.0 





 98.6       


 


3/12/18 12:00  119      


 


3/12/18 11:01  105 31 120/57 100 Non-Rebreather 15.0 


 


3/12/18 11:00  116 32 91/62 100 Non-Rebreather 15.0 








Status:  sedated


Condition:  critical


HEENT:  atraumatic


Neck:  full ROM


Lungs:  chest wall tender


Heart:  HR/BP stable


Abdomen:  soft, active bowel sounds, feeding tube


Extremities:  edema


Decubiti:  location, stage


Micro:





Microbiology








 Date/Time


Source Procedure


Growth Status


 


 


 3/11/18 09:25


Blood Blood Culture - Preliminary


NO GROWTH AFTER 24 HOURS Resulted


 


 3/11/18 09:15


Blood Blood Culture - Preliminary


NO GROWTH AFTER 24 HOURS Resulted








Accucheck:  313





Critical Care - Subjective


ROS Limited/Unobtainable:  No


Interval Events:


on 100% fm


Condition:  critical


EKG Rhythm:  Sinus Rhythm


FI02:  100


Sputum Amount:  None


Drips:  none


Tube Feeding Amount:  55


I&O:











Intake and Output  


 


 3/12/18 3/13/18





 19:00 07:00


 


Intake Total 715 ml 1315 ml


 


Output Total 570 ml 1215 ml


 


Balance 145 ml 100 ml


 


  


 


Free Water  200 ml


 


IV Total 55 ml 305 ml


 


Tube Feeding 660 ml 660 ml


 


Other  150 ml


 


Output Urine Total 570 ml 1215 ml








CXR:


improving


Labs:





Laboratory Tests








Test


  3/12/18


21:20 3/12/18


22:00


 


Vancomycin Level Trough


  14.6 ug/mL


(5.0-12.0)  H 


 


 


Urine Color  Yellow  


 


Urine Appearance


  


  Slightly


cloudy


 


Urine pH  5 (4.5-8.0)  


 


Urine Specific Gravity


  


  1.010


(1.005-1.035)


 


Urine Protein


  


  1+ (NEGATIVE)


H


 


Urine Glucose (UA)


  


  Negative


(NEGATIVE)


 


Urine Ketones


  


  Negative


(NEGATIVE)


 


Urine Occult Blood


  


  4+ (NEGATIVE)


H


 


Urine Nitrite


  


  Negative


(NEGATIVE)


 


Urine Bilirubin


  


  Negative


(NEGATIVE)


 


Urine Urobilinogen


  


  Normal MG/DL


(0.0-1.0)


 


Urine Leukocyte Esterase


  


  3+ (NEGATIVE)


H


 


Urine RBC


  


  15-20 /HPF (0


- 0)  H


 


Urine WBC


  


  5-10 /HPF (0 -


0)  H


 


Urine Squamous Epithelial


Cells 


  None /LPF


(NONE/OCC)


 


Urine Amorphous Sediment


  


  Few /LPF


(NONE)  H


 


Urine Bacteria


  


  Few /HPF


(NONE)

















JOAN DEMARCO Mar 13, 2018 10:02

## 2018-03-13 NOTE — INFECTIOUS DISEASES PROG NOTE
Assessment/Plan


Problems:  


(1) HCAP (healthcare-associated pneumonia)


Assessment & Plan:  with sputum culture grew MDR Providencia stuartii , on 

meropenem , and  vancomycin empiric coverage . continue  aspiration precaution 

with suctioning . monitor cxr . prognosis is poor , D/W POA , and his nephew . 

he is DNR/DNI and comfort care 





(2) UTI (urinary tract infection)


Assessment & Plan:  no urine culture was collected , already on meropenem which

  covers  his UTI 





(3) Sepsis


Assessment & Plan:  due to the above, continue vancomycin and meropenem ,  

initial blood culture remains negative, will repeat blood culture today   





(4) Acute respiratory failure


Assessment & Plan:  due to the above , not improving, on NRM with high flow 

oxygen , continue inhalers and high flow oxygen, monitor CXR  





(5) Diabetes mellitus


Assessment & Plan:  recommend tight glycemic control to keep blood glucose 

between 100-140 





(6) Fever


Assessment & Plan:  due to the above,  improved, continue wide spectrum 

antibiotics , and Tylenol PRN 








Subjective


ROS Limited/Unobtainable:  Yes


Allergies:  


Coded Allergies:  


     No Known Allergies (Unverified , 3/23/16)


Subjective


he was lying  in bed,  lethargic , unresponsive , still on high flow oxygen via 

mask , comfortable, no fever or chills , no SOB, with wheezing  sounds, no 

diarrhea





Objective


Vital Signs





Last 24 Hour Vital Signs








  Date Time  Temp Pulse Resp B/P (MAP) Pulse Ox O2 Delivery O2 Flow Rate FiO2


 


3/13/18 14:00  99 37 106/47 95 Venturi Mask  55


 


3/13/18 13:31  99  107/46    


 


3/13/18 13:00  97 37 107/46 95 Venturi Mask  55


 


3/13/18 12:36  90 34  99 Venturi Mask 14.0 55


 


3/13/18 12:33  90 35  96 Venturi Mask 14.0 55


 


3/13/18 12:00 99.1 88 42 103/41 96 Venturi Mask  55





 99.1       


 


3/13/18 11:00  78 42 101/45 99 Venturi Mask  55


 


3/13/18 11:00     100 Venturi Mask 14.0 55


 


3/13/18 11:00      Venturi Mask 14.0 55


 


3/13/18 10:00  75 41 110/48 99 Non-Rebreather 15.0 100


 


3/13/18 09:43  94      


 


3/13/18 09:43  94  114/50    


 


3/13/18 09:00  94 40 117/46 99 Non-Rebreather 15.0 100


 


3/13/18 08:00  89      


 


3/13/18 08:00 98.7 92 41 113/44 100 Non-Rebreather 15.0 100





 98.7       


 


3/13/18 07:18  89 30  97 Non-Rebreather 15.0 100


 


3/13/18 07:11  88 30  100 Non-Rebreather 15.0 100


 


3/13/18 07:11     88 Non-Rebreather 15.0 100


 


3/13/18 07:11      Non-Rebreather 15.0 100


 


3/13/18 07:00  86 41 106/40 100 Non-Rebreather 15.0 100


 


3/13/18 06:00  92 45 105/41 100 Non-Rebreather 15.0 100


 


3/13/18 05:38  101  111/45    


 


3/13/18 05:00  98 45 111/44 100 Non-Rebreather 15.0 100


 


3/13/18 04:06  94      


 


3/13/18 04:00 98.7 96 39 116/49 100 Non-Rebreather 15.0 100





 98.7       


 


3/13/18 03:00  93 45 116/48 98 Non-Rebreather 15.0 100


 


3/13/18 02:00  90 35 103/43 98 Non-Rebreather 15.0 100


 


3/13/18 01:00  81 34 107/44 97 Non-Rebreather 15.0 100


 


3/13/18 00:00 98.9 78 37 113/47 97 Non-Rebreather 15.0 100





 98.9       


 


3/12/18 23:56  79      


 


3/12/18 23:00  102 40 117/48 98 Non-Rebreather 15.0 100


 


3/12/18 22:28  104  129/60    


 


3/12/18 22:00  98 46 129/57 97 Non-Rebreather 15.0 100


 


3/12/18 21:00  98 40 138/64 92 Non-Rebreather 15.0 100


 


3/12/18 20:37  127      


 


3/12/18 20:35  123  143/63    


 


3/12/18 20:00 99.9 124 38 128/55 92 Non-Rebreather 15.0 100





 99.9       


 


3/12/18 19:21  99 33  96 Non-Rebreather 15.0 100


 


3/12/18 19:14  99 33  95 Non-Rebreather 15.0 100


 


3/12/18 19:02      Non-Rebreather 15.0 100


 


3/12/18 19:02     94 Non-Rebreather 15.0 100


 


3/12/18 19:00  118 15 128/55 98 Non-Rebreather 15.0 


 


3/12/18 18:00  124 33 127/58 96 Non-Rebreather 15.0 


 


3/12/18 17:00  119 30 122/54 98 Non-Rebreather 15.0 


 


3/12/18 16:00  115      


 


3/12/18 16:00 97.6 118 34 133/54 97 Non-Rebreather 15.0 





 97.6       


 


3/12/18 15:00  120 30 106/55 100 Non-Rebreather 15.0 


 


3/12/18 15:00  106 34 102/53 97 Non-Rebreather 15.0 








Height (Feet):  6


Height (Inches):  1.00


Weight (Pounds):  163


General Appearance:  WD/WN, no acute distress


HEENT:  normocephalic, atraumatic, anicteric, mucous membranes moist


Respiratory/Chest:  chest wall non-tender, lungs clear, normal breath sounds, 

no respiratory distress, no accessory muscle use


Cardiovascular:  normal peripheral pulses, normal rate, regular rhythm, no 

gallop/murmur, no JVD


Abdomen:  normal bowel sounds, soft, non tender, no organomegaly, non distended

, no mass, no scars


Extremities:  no cyanosis, no clubbing


Skin:  no rash, no lesions, ulcers


Neurologic/Psychiatric:  unresponsiveness


Lymphatic:  no neck adenopathy, no groin adenopathy


Musculoskeletal:  normal muscle bulk





Microbiology








 Date/Time


Source Procedure


Growth Status


 


 


 3/11/18 09:25


Blood Blood Culture - Preliminary


NO GROWTH AFTER 24 HOURS Resulted


 


 3/11/18 09:15


Blood Blood Culture - Preliminary


NO GROWTH AFTER 24 HOURS Resulted











Laboratory Tests








Test


  3/12/18


21:20 3/12/18


22:00


 


Vancomycin Level Trough


  14.6 ug/mL


(5.0-12.0)  H 


 


 


Urine Color  Yellow  


 


Urine Appearance


  


  Slightly


cloudy


 


Urine pH  5 (4.5-8.0)  


 


Urine Specific Gravity


  


  1.010


(1.005-1.035)


 


Urine Protein


  


  1+ (NEGATIVE)


H


 


Urine Glucose (UA)


  


  Negative


(NEGATIVE)


 


Urine Ketones


  


  Negative


(NEGATIVE)


 


Urine Occult Blood


  


  4+ (NEGATIVE)


H


 


Urine Nitrite


  


  Negative


(NEGATIVE)


 


Urine Bilirubin


  


  Negative


(NEGATIVE)


 


Urine Urobilinogen


  


  Normal MG/DL


(0.0-1.0)


 


Urine Leukocyte Esterase


  


  3+ (NEGATIVE)


H


 


Urine RBC


  


  15-20 /HPF (0


- 0)  H


 


Urine WBC


  


  5-10 /HPF (0 -


0)  H


 


Urine Squamous Epithelial


Cells 


  None /LPF


(NONE/OCC)


 


Urine Amorphous Sediment


  


  Few /LPF


(NONE)  H


 


Urine Bacteria


  


  Few /HPF


(NONE)











Current Medications








 Medications


  (Trade)  Dose


 Ordered  Sig/Shasta


 Route


 PRN Reason  Start Time


 Stop Time Status Last Admin


Dose Admin


 


 Acetaminophen


  (Tylenol)  650 mg  Q6H  PRN


 GT


 Temp > 100.5  3/12/18 17:15


 4/6/18 12:14  3/13/18 09:44


 


 


 Aspirin


  (ASA)  81 mg  DAILY


 GT


   3/8/18 09:00


 4/7/18 08:59  3/13/18 09:43


 


 


 Dextrose


  (Dextrose 50%)    STAT  PRN


 IV


 Hypoglycemia  3/11/18 14:30


 4/10/18 14:29   


 


 


 Digoxin


  (Lanoxin)  0.25 mg  DAILY


 PEG


   3/11/18 09:00


 4/10/18 08:59  3/13/18 09:43


 


 


 Diltiazem HCl


  (Cardizem)  60 mg  EVERY 8  HOURS


 PEG


   3/12/18 22:00


 4/11/18 21:59  3/13/18 13:31


 


 


 Docusate Sodium


  (Colace)  100 mg  TID


 GT


   3/7/18 13:00


 4/6/18 12:59  3/13/18 13:30


 


 


 Furosemide


  (Lasix)  40 mg  DAILY


 GT


   3/8/18 09:00


 4/7/18 08:59  3/13/18 09:42


 


 


 Insulin Aspart


  (NovoLOG)    EVERY 6  HOURS


 SUBQ


   3/11/18 18:00


 4/10/18 17:59  3/13/18 13:33


 


 


 Insulin Detemir


  (Levemir)  20 units  Q12HR


 SUBQ


   3/9/18 21:00


 4/7/18 08:59  3/13/18 09:45


 


 


 Levalbuterol HCl


  (Xopenex)  1.25 mg  TIDRT


 HHN


   3/9/18 08:00


 3/14/18 07:59  3/13/18 12:33


 


 


 Meropenem 1 gm/


 Sodium Chloride  55 ml @ 


 110 mls/hr  Q12HR@0200,1400


 IVPB


   3/11/18 14:00


 3/16/18 13:59  3/13/18 13:31


 


 


 Metoclopramide HCl


  (Reglan)  5 mg  EVERY 6  HOURS


 GT


   3/7/18 12:30


 4/6/18 12:29  3/13/18 13:30


 


 


 Metoprolol


 Tartrate


  (Lopressor)  25 mg  Q12HR


 ORAL


   3/10/18 21:00


 4/9/18 20:59  3/13/18 09:43


 


 


 Morphine Sulfate


  (Morphine


 Sulfate)  2 mg  Q4H  PRN


 IVP


 For Pain / RR above 20  3/12/18 17:15


 3/19/18 17:14  3/12/18 19:55


 


 


 Sitagliptin


 Phosphate


  (Januvia)  50 mg  DAILY


 GT


   3/8/18 09:00


 4/7/18 08:59  3/13/18 09:42


 


 


 Vancomycin HCl


  (Vanco rx to


 dose)  1 ea  DAILY  PRN


 MISC


 Per rx protocol  3/7/18 12:00


 4/6/18 11:59   


 


 


 Vancomycin HCl/


 Dextrose  250 ml @ 


 166.667


 mls/hr  Q24H


 IVPB


   3/10/18 22:00


 3/15/18 21:59  3/12/18 22:28


 

















Amira Gross M.D. Mar 13, 2018 14:41

## 2018-03-13 NOTE — DIAGNOSTIC IMAGING REPORT
Indication: Dyspnea

 

Technique: One view of the chest

 

Comparison: 3/12/2018

 

Findings: Fairly extensive bilateral infiltrates are again demonstrated. These are

unchanged. The heart size is normal. The pleural spaces remain clear

 

Impression:  Unchanged, over one day, findings as above.

## 2018-03-13 NOTE — CARDIAC ELECTROPHYSIOLOGY PN
Assessment/Plan


Assessment/Plan


1. Atrial fibrillation with rapid ventricular response.  Better  on Cardizem 60 

q 8, digoxin 0.25 mg daily and Lopressor 25 bid


   Echocardiogram showed ejection fraction of 60%.


2. Hypertension. On metoprolol and Cardizem.


3. Healthcare-associated pneumonia.  Sputum culture grew multidrug


resistant Providencia.  IV antibiotic per Dr. Gross.


4. Urinary tract infection and sepsis.


5. Respiratory failure, on face mask.  He is Do Not Intubate.


6. Diabetes.


7. Dysphagia, status post PEG


8. DNR and DNI. Going back to [facility].





TED RN





Subjective


Subjective


In ICU. -120 in sinus tach.





Objective





Last 24 Hour Vital Signs








  Date Time  Temp Pulse Resp B/P (MAP) Pulse Ox O2 Delivery O2 Flow Rate FiO2


 


3/13/18 15:00  98 35 105/48 95 Venturi Mask  55


 


3/13/18 14:00  99 37 106/47 95 Venturi Mask  55


 


3/13/18 13:31  99  107/46    


 


3/13/18 13:00  97 37 107/46 95 Venturi Mask  55


 


3/13/18 12:36  90 34  99 Venturi Mask 14.0 55


 


3/13/18 12:33  90 35  96 Venturi Mask 14.0 55


 


3/13/18 12:00 99.1 88 42 103/41 96 Venturi Mask  55





 99.1       


 


3/13/18 12:00  84      


 


3/13/18 11:00  78 42 101/45 99 Venturi Mask  55


 


3/13/18 11:00     100 Venturi Mask 14.0 55


 


3/13/18 11:00      Venturi Mask 14.0 55


 


3/13/18 10:00  75 41 110/48 99 Non-Rebreather 15.0 100


 


3/13/18 09:43  94      


 


3/13/18 09:43  94  114/50    


 


3/13/18 09:00  94 40 117/46 99 Non-Rebreather 15.0 100


 


3/13/18 08:00  89      


 


3/13/18 08:00 98.7 92 41 113/44 100 Non-Rebreather 15.0 100





 98.7       


 


3/13/18 07:18  89 30  97 Non-Rebreather 15.0 100


 


3/13/18 07:11  88 30  100 Non-Rebreather 15.0 100


 


3/13/18 07:11     88 Non-Rebreather 15.0 100


 


3/13/18 07:11      Non-Rebreather 15.0 100


 


3/13/18 07:00  86 41 106/40 100 Non-Rebreather 15.0 100


 


3/13/18 06:00  92 45 105/41 100 Non-Rebreather 15.0 100


 


3/13/18 05:38  101  111/45    


 


3/13/18 05:00  98 45 111/44 100 Non-Rebreather 15.0 100


 


3/13/18 04:06  94      


 


3/13/18 04:00 98.7 96 39 116/49 100 Non-Rebreather 15.0 100





 98.7       


 


3/13/18 03:00  93 45 116/48 98 Non-Rebreather 15.0 100


 


3/13/18 02:00  90 35 103/43 98 Non-Rebreather 15.0 100


 


3/13/18 01:00  81 34 107/44 97 Non-Rebreather 15.0 100


 


3/13/18 00:00 98.9 78 37 113/47 97 Non-Rebreather 15.0 100





 98.9       


 


3/12/18 23:56  79      


 


3/12/18 23:00  102 40 117/48 98 Non-Rebreather 15.0 100


 


3/12/18 22:28  104  129/60    


 


3/12/18 22:00  98 46 129/57 97 Non-Rebreather 15.0 100


 


3/12/18 21:00  98 40 138/64 92 Non-Rebreather 15.0 100


 


3/12/18 20:37  127      


 


3/12/18 20:35  123  143/63    


 


3/12/18 20:00 99.9 124 38 128/55 92 Non-Rebreather 15.0 100





 99.9       


 


3/12/18 19:21  99 33  96 Non-Rebreather 15.0 100


 


3/12/18 19:14  99 33  95 Non-Rebreather 15.0 100


 


3/12/18 19:02      Non-Rebreather 15.0 100


 


3/12/18 19:02     94 Non-Rebreather 15.0 100


 


3/12/18 19:00  118 15 128/55 98 Non-Rebreather 15.0 


 


3/12/18 18:00  124 33 127/58 96 Non-Rebreather 15.0 


 


3/12/18 17:00  119 30 122/54 98 Non-Rebreather 15.0 


 


3/12/18 16:00  115      


 


3/12/18 16:00 97.6 118 34 133/54 97 Non-Rebreather 15.0 





 97.6       

















Intake and Output  


 


 3/12/18 3/13/18





 19:00 07:00


 


Intake Total 715 ml 1315 ml


 


Output Total 570 ml 1215 ml


 


Balance 145 ml 100 ml


 


  


 


Free Water  200 ml


 


IV Total 55 ml 305 ml


 


Tube Feeding 660 ml 660 ml


 


Other  150 ml


 


Output Urine Total 570 ml 1215 ml











Laboratory Tests








Test


  3/12/18


21:20 3/12/18


22:00


 


Vancomycin Level Trough


  14.6 ug/mL


(5.0-12.0)  H 


 


 


Urine Color  Yellow  


 


Urine Appearance


  


  Slightly


cloudy


 


Urine pH  5 (4.5-8.0)  


 


Urine Specific Gravity


  


  1.010


(1.005-1.035)


 


Urine Protein


  


  1+ (NEGATIVE)


H


 


Urine Glucose (UA)


  


  Negative


(NEGATIVE)


 


Urine Ketones


  


  Negative


(NEGATIVE)


 


Urine Occult Blood


  


  4+ (NEGATIVE)


H


 


Urine Nitrite


  


  Negative


(NEGATIVE)


 


Urine Bilirubin


  


  Negative


(NEGATIVE)


 


Urine Urobilinogen


  


  Normal MG/DL


(0.0-1.0)


 


Urine Leukocyte Esterase


  


  3+ (NEGATIVE)


H


 


Urine RBC


  


  15-20 /HPF (0


- 0)  H


 


Urine WBC


  


  5-10 /HPF (0 -


0)  H


 


Urine Squamous Epithelial


Cells 


  None /LPF


(NONE/OCC)


 


Urine Amorphous Sediment


  


  Few /LPF


(NONE)  H


 


Urine Bacteria


  


  Few /HPF


(NONE)











Microbiology








 Date/Time


Source Procedure


Growth Status


 


 


 3/11/18 09:25


Blood Blood Culture - Preliminary


NO GROWTH AFTER 24 HOURS Resulted


 


 3/11/18 09:15


Blood Blood Culture - Preliminary


NO GROWTH AFTER 24 HOURS Resulted








Objective


HEAD AND NECK:  No JVD. Face mask on


LUNGS:  Coarse rhonchi bilaterally.


CARDIOVASCULAR:   tachycardic S1 and S2 with no gallop or murmur.


ABDOMEN:  Soft.  Status post PEG.


EXTREMITIES:  No pitting edema.











LAI REA Mar 13, 2018 15:55

## 2018-03-13 NOTE — GENERAL PROGRESS NOTE
Assessment/Plan


Problem List:  


(1) Acute respiratory failure with hypoxia


Assessment & Plan:  Pneumonia


ICD Codes:  J96.01 - Acute respiratory failure with hypoxia


SNOMED:  95413112, 960662748


(2) Dementia


ICD Codes:  F03.90 - Unspecified dementia without behavioral disturbance


SNOMED:  98958623


Qualifiers:  


   Qualified Codes:  F03.90 - Unspecified dementia without behavioral 

disturbance


(3) Functional quadriplegia


ICD Codes:  R53.2 - Functional quadriplegia


SNOMED:  994782716803717


(4) Feeding by G-tube


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


SNOMED:  798027079, 919499921


(5) Atrial flutter with rapid ventricular response


ICD Codes:  I48.92 - Unspecified atrial flutter


SNOMED:  7382568


Status:  stable


Assessment/Plan


discussed with MERCEDES Guzman in dept-


agreed with hospice and comfort care


will transfer to med surg


DNR DNI


no further blood work.


to ECF today


will talk to ID


breathing treatment-


Antibiotics-


per orders





Subjective


ROS Limited/Unobtainable:  No


Constitutional:  Reports: malaise, weakness


Allergies:  


Coded Allergies:  


     No Known Allergies (Unverified , 3/23/16)





Objective





Last 24 Hour Vital Signs








  Date Time  Temp Pulse Resp B/P (MAP) Pulse Ox O2 Delivery O2 Flow Rate FiO2


 


3/13/18 12:36  90 34  99 Venturi Mask 14.0 55


 


3/13/18 12:33  90 35  96 Venturi Mask 14.0 55


 


3/13/18 11:00  78 42 101/45 99 Venturi Mask  55


 


3/13/18 11:00     100 Venturi Mask 14.0 55


 


3/13/18 11:00      Venturi Mask 14.0 55


 


3/13/18 10:00  75 41 110/48 99 Non-Rebreather 15.0 100


 


3/13/18 09:43  94      


 


3/13/18 09:43  94  114/50    


 


3/13/18 09:00  94 40 117/46 99 Non-Rebreather 15.0 100


 


3/13/18 08:00  89      


 


3/13/18 08:00 98.7 92 41 113/44 100 Non-Rebreather 15.0 100





 98.7       


 


3/13/18 07:18  89 30  97 Non-Rebreather 15.0 100


 


3/13/18 07:11  88 30  100 Non-Rebreather 15.0 100


 


3/13/18 07:11     88 Non-Rebreather 15.0 100


 


3/13/18 07:11      Non-Rebreather 15.0 100


 


3/13/18 07:00  86 41 106/40 100 Non-Rebreather 15.0 100


 


3/13/18 06:00  92 45 105/41 100 Non-Rebreather 15.0 100


 


3/13/18 05:38  101  111/45    


 


3/13/18 05:00  98 45 111/44 100 Non-Rebreather 15.0 100


 


3/13/18 04:06  94      


 


3/13/18 04:00 98.7 96 39 116/49 100 Non-Rebreather 15.0 100





 98.7       


 


3/13/18 03:00  93 45 116/48 98 Non-Rebreather 15.0 100


 


3/13/18 02:00  90 35 103/43 98 Non-Rebreather 15.0 100


 


3/13/18 01:00  81 34 107/44 97 Non-Rebreather 15.0 100


 


3/13/18 00:00 98.9 78 37 113/47 97 Non-Rebreather 15.0 100





 98.9       


 


3/12/18 23:56  79      


 


3/12/18 23:00  102 40 117/48 98 Non-Rebreather 15.0 100


 


3/12/18 22:28  104  129/60    


 


3/12/18 22:00  98 46 129/57 97 Non-Rebreather 15.0 100


 


3/12/18 21:00  98 40 138/64 92 Non-Rebreather 15.0 100


 


3/12/18 20:37  127      


 


3/12/18 20:35  123  143/63    


 


3/12/18 20:00 99.9 124 38 128/55 92 Non-Rebreather 15.0 100





 99.9       


 


3/12/18 19:21  99 33  96 Non-Rebreather 15.0 100


 


3/12/18 19:14  99 33  95 Non-Rebreather 15.0 100


 


3/12/18 19:02      Non-Rebreather 15.0 100


 


3/12/18 19:02     94 Non-Rebreather 15.0 100


 


3/12/18 19:00  118 15 128/55 98 Non-Rebreather 15.0 


 


3/12/18 18:00  124 33 127/58 96 Non-Rebreather 15.0 


 


3/12/18 17:00  119 30 122/54 98 Non-Rebreather 15.0 


 


3/12/18 16:00  115      


 


3/12/18 16:00 97.6 118 34 133/54 97 Non-Rebreather 15.0 





 97.6       


 


3/12/18 15:00  120 30 106/55 100 Non-Rebreather 15.0 


 


3/12/18 15:00  106 34 102/53 97 Non-Rebreather 15.0 


 


3/12/18 14:08  124  111/55    


 


3/12/18 14:00  116 34 109/49 97 Non-Rebreather 15.0 

















Intake and Output  


 


 3/12/18 3/13/18





 19:00 07:00


 


Intake Total 715 ml 1315 ml


 


Output Total 570 ml 1215 ml


 


Balance 145 ml 100 ml


 


  


 


Free Water  200 ml


 


IV Total 55 ml 305 ml


 


Tube Feeding 660 ml 660 ml


 


Other  150 ml


 


Output Urine Total 570 ml 1215 ml











Current Medications








 Medications


  (Trade)  Dose


 Ordered  Sig/Shasta


 Route


 PRN Reason  Start Time


 Stop Time Status Last Admin


Dose Admin


 


 Acetaminophen


  (Tylenol)  650 mg  Q6H  PRN


 GT


 Temp > 100.5  3/12/18 17:15


 4/6/18 12:14  3/13/18 09:44


 


 


 Aspirin


  (ASA)  81 mg  DAILY


 GT


   3/8/18 09:00


 4/7/18 08:59  3/13/18 09:43


 


 


 Dextrose


  (Dextrose 50%)    STAT  PRN


 IV


 Hypoglycemia  3/11/18 14:30


 4/10/18 14:29   


 


 


 Digoxin


  (Lanoxin)  0.25 mg  DAILY


 PEG


   3/11/18 09:00


 4/10/18 08:59  3/13/18 09:43


 


 


 Diltiazem HCl


  (Cardizem)  60 mg  EVERY 8  HOURS


 PEG


   3/12/18 22:00


 4/11/18 21:59  3/13/18 05:38


 


 


 Docusate Sodium


  (Colace)  100 mg  TID


 GT


   3/7/18 13:00


 4/6/18 12:59  3/13/18 09:44


 


 


 Furosemide


  (Lasix)  40 mg  DAILY


 GT


   3/8/18 09:00


 4/7/18 08:59  3/13/18 09:42


 


 


 Insulin Aspart


  (NovoLOG)    EVERY 6  HOURS


 SUBQ


   3/11/18 18:00


 4/10/18 17:59  3/13/18 05:39


 


 


 Insulin Detemir


  (Levemir)  20 units  Q12HR


 SUBQ


   3/9/18 21:00


 4/7/18 08:59  3/13/18 09:45


 


 


 Levalbuterol HCl


  (Xopenex)  1.25 mg  TIDRT


 HHN


   3/9/18 08:00


 3/14/18 07:59  3/13/18 12:33


 


 


 Meropenem 1 gm/


 Sodium Chloride  55 ml @ 


 110 mls/hr  Q12HR@0200,1400


 IVPB


   3/11/18 14:00


 3/16/18 13:59  3/13/18 01:33


 


 


 Metoclopramide HCl


  (Reglan)  5 mg  EVERY 6  HOURS


 GT


   3/7/18 12:30


 4/6/18 12:29  3/13/18 05:36


 


 


 Metoprolol


 Tartrate


  (Lopressor)  25 mg  Q12HR


 ORAL


   3/10/18 21:00


 4/9/18 20:59  3/13/18 09:43


 


 


 Morphine Sulfate


  (Morphine


 Sulfate)  2 mg  Q4H  PRN


 IVP


 For Pain / RR above 20  3/12/18 17:15


 3/19/18 17:14  3/12/18 19:55


 


 


 Sitagliptin


 Phosphate


  (Januvia)  50 mg  DAILY


 GT


   3/8/18 09:00


 4/7/18 08:59  3/13/18 09:42


 


 


 Vancomycin HCl


  (Vanco rx to


 dose)  1 ea  DAILY  PRN


 MISC


 Per rx protocol  3/7/18 12:00


 4/6/18 11:59   


 


 


 Vancomycin HCl/


 Dextrose  250 ml @ 


 166.667


 mls/hr  Q24H


 IVPB


   3/10/18 22:00


 3/15/18 21:59  3/12/18 22:28


 





Laboratory Tests


3/12/18 21:20: Vancomycin Level Trough 14.6H


3/12/18 22:00: 


Urine Color Yellow, Urine Appearance Slightly cloudy, Urine pH 5, Urine 

Specific Gravity 1.010, Urine Protein 1+H, Urine Glucose (UA) Negative, Urine 

Ketones Negative, Urine Occult Blood 4+H, Urine Nitrite Negative, Urine 

Bilirubin Negative, Urine Urobilinogen Normal, Urine Leukocyte Esterase 3+H, 

Urine RBC 15-20H, Urine WBC 5-10H, Urine Squamous Epithelial Cells None, Urine 

Amorphous Sediment FewH, Urine Bacteria Few


Height (Feet):  6


Height (Inches):  1.00


Weight (Pounds):  163


General Appearance:  no apparent distress


Neck:  limited range of motion


Cardiovascular:  arrhythmia


Respiratory/Chest:  decreased breath sounds


Abdomen:  distended


Objective


no change











NAY CALDWELL Mar 13, 2018 13:35

## 2018-03-13 NOTE — DISCHARGE INSTRUCTIONS
Discharge Instructions


Discharge Instructions


Diet:  other - GT feeding


Special Instructions





DNR DNI


Comfort care-


Hospice


No hospitalization


O2


HHN





For Congestive Heart Failure


Reminder


Report to your physician any weight gain of 5 pounds or more in one week.











NAY CALDWELL Mar 13, 2018 14:48

## 2018-03-14 NOTE — DISCHARGE SUMMARY 2 SIG
DATE OF ADMISSION:  03/07/2018



DATE OF DISCHARGE:  03/13/2018



CONSULTANTS:

1. Amira Gross M.D.

2. Felipe Acevedo M.D.



BRIEF HOSPITAL COURSE:  The patient is an 88-year-old male, presented to

nursing home due to respiratory distress.  The patient was noted to have

crackles and difficulty breathing and was hypoxic.  He has medical history

significant for diabetes mellitus, hypertension, CVA/TIA, and dementia.

He was recently discharged from hospital due to aspiration pneumonia.  He

has DPOA and the patient is do not intubate, however, compressions okay if

required.  

           On evaluation at ED, he was noted to have tachycardia.  There

was no leukocytosis. WBC was 10.  Hemoglobin and hematocrit was stable.

BUN and creatinine were elevated.  Lactic acid was 2.2.  Troponins were

negative.  He had a chest x-ray done that showed right lower lobe

consolidation.  He was started on IV antibiotic and the patient was then

admitted to [facility] for respiratory failure, dehydration and dementia.  He was

followed by Infectious Disease specialist and pulmonologist.  He was

started empirically on vancomycin and Zosyn pending culture results.  He

was pancultured.  He was given pulmonary support.  Sputum culture grew MDR

Providencia stuartii.  He was continued on meropenem and vancomycin.

Initial blood culture was negative.  He continued to have respiratory

failure and is not improving on high-flow oxygenation.  He was given

inhalers and was having fever.  Influenza screen was negative.  He

developed atrial fibrillation with rapid ventricular response.  He was

transferred to ICU and was eventually started on Cardizem drip.  He was

given IV digoxin.  Most recent echocardiogram showed ejection

fraction of 60%.  He was eventually taken off Cardizem drip and was placed

on Cardizem 60 mg q.8 h. to continue with digoxin 0.25 mg daily and

Lopressor 25 mg b.i.d.  He continued to have respiratory distress.  Per

DPOA wishes, the patient be placed in hospice and comfort care.  He was

eventually discharged back to nursing home.



FINAL DIAGNOSES:

1. Acute respiratory failure with hypoxia.

2. HCAP pneumonia.

3. Dementia.

4. Functional quadriplegia.

5. Feeding via G-tube.

6. Atrial flutter with rapid ventricular response.

7. Sepsis, possibly due to HCAP.

8. Diabetes mellitus.

9. Hypertension.

10. Comfort care/hospice/palliative care.

11. Multiple deep tissue injury, present on admission.



DISPOSITION:  The patient was transferred to SNF.







  ______________________________________________

  Nish Schilling M.D.



I have been assigned to dictate discharge summary on this account and I

was not involved in the patient's management.



  ______________________________________________

  Jojo Harrington N.P.





DR:  TRESSA

D:  03/13/2018 18:30

T:  03/14/2018 13:21

JOB#:  [id number]

CC:



KARRI

## 2021-11-25 NOTE — CONSULTATION
History of Present Illness


General


Date patient seen:  Feb 27, 2018


Chief Complaint:  Dyspnea/Respdistress





Present Illness


HPI


 88-year-old male with hx of dementia, Gtube, hypertension, diabetes and 

previous sepsis from a nursing home presented with altered mental status with 

hypoxia and tachycardia for one   .  Patient was recently admitted here last 

month for sepsis.  Pt cant give any history and all information is obtained 

from the chart.


Allergies:  


Coded Allergies:  


     No Known Allergies (Unverified , 3/23/16)





Medication History


Scheduled


Albuterol Sulfate* (Albuterol Sulfate Hhn*), 2.5 MG HHN TIDRT


Amoxicillin/Potassium Clav 250-62.5/5 Susp (Amox Tr-K Clv 250-62.5/5 Susp), 500 

MG NG Q8HR


Ascorbic Acid* (Vitamin C*), 500 MG ORAL DAILY, (Reported)


Aspirin* (Aspir 81*), 81 MG GT DAILY, (Reported)


Atorvastatin Calcium* (Atorvastatin Calcium*), 10 MG GT BEDTIME, (Reported)


Balsam [place] Oil (Venelex Ointment), 60 GM TP DAILY, (Reported)


Docusate Sodium (Docusate Sodium), 100 MG GT DAILY, (Reported)


Furosemide* (Lasix*), 40 MG GT DAILY


Heparin Sod (Porcine) (Heparin Sodium*), 5,000 UNITS SUBQ EVERY 12 HOURS, (

Reported)


Insulin Aspart* (Novolog*), 0 SUBQ AC+HS, (Reported)


Insulin Detemir (Levemir Flexpen), 30 UNITS SUBQ Q12HR


Metoclopramide HCl (Metoclopramide HCl), 5 MG GT EVERY 6 HOURS


Metoprolol Tartrate (Metoprolol Tartrate), 12.5 MG ORAL Q12HR


Nitrofurantoin Monohyd/M-Cryst (Nitrofurantoin Mono-Mcr 100 mg), 100 MG ORAL 

EVERY 12 HOURS


Ranitidine Hcl* (Zantac*), 150 MG GT TWICE A DAY, (Reported)


Repaglinide (Prandin), 2 MG GT THREE TIMES A DAY, (Reported)


Sennosides (Senna), 8.6 MG GT QHS, (Reported)


Sitagliptin (Januvia), 50 MG GT ACBREAKFAST


Tamsulosin Hcl (Tamsulosin Hcl*), 0.4 MG GT BEDTIME, (Reported)


Theophylline (Theophylline), 80 MG GT EVERY 12 HOURS


Zinc Sulfate (Zinc Sulfate*), 220 MG GT DAILY, (Reported)





Scheduled PRN


Acetaminophen* (Acetaminophen 325MG Tablet*), 650 MG GT Q6H PRN for Mild Pain/

Temp > 100.5, (Reported)





Miscellaneous Medications


Multivit-Min/Iron Fum/Folic AC (Multi-Vitamin-Minerals Tablet), 1 EACH GT, (

Reported)





Discontinued Medications


Cephalexin* (Keflex*), 500 MG ORAL EVERY 6 HOURS


   Discontinued Reason: Pt had allergic rxn


Levofloxacin* (Levaquin*), 500 MG ORAL DAILY


   Discontinued Reason: Therapy completed





Patient History


Healthcare decision maker


N


Resuscitation status


Do Not Resuscitate


Advanced Directive on File


Yes





Past Medical/Surgical History


Past Medical/Surgical History:  


(1) Diabetes mellitus


(2) Dementia


(3) Feeding by G-tube


(4) Functional quadriplegia





Review of Systems


All Other Systems:  negative except mentioned in HPI





Physical Exam


General Appearance:  WD/WN, no apparent distress


Lines, tubes and drains:  peripheral


HEENT:  normocephalic, atraumatic


Neck:  non-tender, normal alignment


Respiratory/Chest:  chest wall non-tender, rhonchi  - left, rhonchi - right


Breasts:  no masses


Cardiovascular/Chest:  normal peripheral pulses, normal rate


Abdomen:  normal bowel sounds, non tender, hyperactive bowel sounds


Genitourinary/Rectal:  normal genital exam, normal rectal exam





Last 24 Hour Vital Signs








  Date Time  Temp Pulse Resp B/P (MAP) Pulse Ox O2 Delivery O2 Flow Rate FiO2


 


2/27/18 20:00 100.0 111 32 114/58 92 Venturi Mask  50





 100.0       


 


2/27/18 15:59  103      


 


2/27/18 15:30 98.7 121 22 137/83 94 Venturi Mask  50





 98.7       


 


2/27/18 14:38 99.5 112 23 100/48 96 Venturi Mask 15.0 50





 99.5       


 


2/27/18 14:33  120      


 


2/27/18 13:36 99.5 112 23 100/48 96 Venturi Mask 15.0 50





 99.5       


 


2/27/18 11:30 99.5 108 25 100/46 98 Venturi Mask 15.0 50





 99.5       


 


2/27/18 10:44 99.5 110 25 109/55 98 Venturi Mask 15.0 50





 99.5       


 


2/27/18 09:40 100.7       





 100.7       


 


2/27/18 09:16  108 20 99/49 99 Non-Rebreather 15.0 


 


2/27/18 08:20 101.0       


 


2/27/18 08:07 101.0 119 23 123/61 93 Venturi Mask 15.0 50





 101.0       


 


2/27/18 06:49 99.1       


 


2/27/18 06:30  128 26   Venturi Mask 12.0 50


 


2/27/18 06:30 102.1 128 26 136/80 94 Venturi Mask 12.0 50





 102.1       


 


2/27/18 06:07 99.1 118 20 136/80 97 Nasal Cannula 3.0 





 99.1       

















Intake and Output  


 


 2/26/18 2/27/18





 19:00 07:00


 


Intake Total  0 ml


 


Balance  0 ml


 


  


 


Intake Oral  0 ml











Laboratory Tests








Test


  2/27/18


06:40 2/27/18


07:30 2/27/18


08:30 2/27/18


08:51


 


White Blood Count


  6.4 K/UL


(4.8-10.8) 


  


  


 


 


Red Blood Count


  4.34 M/UL


(4.70-6.10)  L 


  


  


 


 


Hemoglobin


  14.3 G/DL


(14.2-18.0) 


  


  


 


 


Hematocrit


  41.2 %


(42.0-52.0)  L 


  


  


 


 


Mean Corpuscular Volume 95 FL (80-99)     


 


Mean Corpuscular Hemoglobin


  33.0 PG


(27.0-31.0)  H 


  


  


 


 


Mean Corpuscular Hemoglobin


Concent 34.8 G/DL


(32.0-36.0) 


  


  


 


 


Red Cell Distribution Width


  11.8 %


(11.6-14.8) 


  


  


 


 


Platelet Count


  103 K/UL


(150-450)  L 


  


  


 


 


Mean Platelet Volume


  8.2 FL


(6.5-10.1) 


  


  


 


 


Neutrophils (%) (Auto)


  % (45.0-75.0)


  


  


  


 


 


Lymphocytes (%) (Auto)


  % (20.0-45.0)


  


  


  


 


 


Monocytes (%) (Auto)  % (1.0-10.0)     


 


Eosinophils (%) (Auto)  % (0.0-3.0)     


 


Basophils (%) (Auto)  % (0.0-2.0)     


 


Differential Total Cells


Counted 100  


  


  


  


 


 


Neutrophils % (Manual) 72 % (45-75)     


 


Lymphocytes % (Manual) 7 % (20-45)  L   


 


Monocytes % (Manual) 12 % (1-10)  H   


 


Eosinophils % (Manual) 0 % (0-3)     


 


Basophils % (Manual) 1 % (0-2)     


 


Band Neutrophils 8 % (0-8)     


 


Platelet Estimate Decreased  L   


 


Platelet Morphology Normal     


 


Anisocytosis 1+     


 


Prothrombin Time


  10.8 SEC


(9.30-11.50) 


  


  


 


 


Prothromb Time International


Ratio 1.0 (0.9-1.1)  


  


  


  


 


 


Activated Partial


Thromboplast Time 26 SEC (23-33)


  


  


  


 


 


Sodium Level


  138 MMOL/L


(136-145) 


  


  


 


 


Potassium Level


  3.2 MMOL/L


(3.5-5.1)  L 


  


  


 


 


Chloride Level


  106 MMOL/L


() 


  


  


 


 


Carbon Dioxide Level


  21 MMOL/L


(21-32) 


  


  


 


 


Anion Gap


  11 mmol/L


(5-15) 


  


  


 


 


Blood Urea Nitrogen


  18 mg/dL


(7-18) 


  


  


 


 


Creatinine


  0.8 MG/DL


(0.55-1.30) 


  


  


 


 


Estimat Glomerular Filtration


Rate  mL/min (>60)  


  


  


  


 


 


Glucose Level


  256 MG/DL


()  H 


  


  


 


 


Lactic Acid Level


  4.00 mmol/L


(0.66-2.22)  H 


  1.00 mmol/L


(0.66-2.22) 


 


 


Calcium Level


  7.1 MG/DL


(8.5-10.1)  L 


  


  


 


 


Total Bilirubin


  1.4 MG/DL


(0.2-1.0)  H 


  


  


 


 


Direct Bilirubin


  0.3 MG/DL


(0.0-0.3) 


  


  


 


 


Aspartate Amino Transf


(AST/SGOT) 16 U/L (15-37)


  


  


  


 


 


Alanine Aminotransferase


(ALT/SGPT) 20 U/L (12-78)


  


  


  


 


 


Alkaline Phosphatase


  78 U/L


() 


  


  


 


 


Total Creatine Kinase


  18 U/L


()  L 


  


  


 


 


Creatine Kinase MB


  0.5 NG/ML


(0.0-3.6) 


  


  


 


 


Creatine Kinase MB Relative


Index 2.7  


  


  


  


 


 


Troponin I


  0.000 ng/mL


(0.000-0.056) 


  


  


 


 


Total Protein


  5.5 G/DL


(6.4-8.2)  L 


  


  


 


 


Albumin


  2.1 G/DL


(3.4-5.0)  L 


  


  


 


 


Globulin 3.4 g/dL     


 


Albumin/Globulin Ratio


  0.6 (1.0-2.7)


L 


  


  


 


 


Urine Color  Yellow    


 


Urine Appearance  Clear    


 


Urine pH  5 (4.5-8.0)    


 


Urine Specific Gravity


  


  1.015


(1.005-1.035) 


  


 


 


Urine Protein


  


  1+ (NEGATIVE)


H 


  


 


 


Urine Glucose (UA)


  


  4+ (NEGATIVE)


H 


  


 


 


Urine Ketones


  


  3+ (NEGATIVE)


H 


  


 


 


Urine Occult Blood


  


  1+ (NEGATIVE)


H 


  


 


 


Urine Nitrite


  


  Negative


(NEGATIVE) 


  


 


 


Urine Bilirubin


  


  Negative


(NEGATIVE) 


  


 


 


Urine Urobilinogen


  


  Normal MG/DL


(0.0-1.0) 


  


 


 


Urine Leukocyte Esterase


  


  1+ (NEGATIVE)


H 


  


 


 


Urine RBC


  


  2-4 /HPF (0 -


0)  H 


  


 


 


Urine WBC


  


  2-4 /HPF (0 -


0) 


  


 


 


Urine Squamous Epithelial


Cells 


  Occasional


/LPF 


  


 


 


Urine Bacteria


  


  Occasional


/HPF (NONE) 


  


 


 


C-Reactive Protein,


Quantitative 


  


  8.1 mg/dL


(0.00-0.90)  H 


 


 


Arterial Blood pH


  


  


  


  7.390


(7.350-7.450)


 


Arterial Blood Partial


Pressure CO2 


  


  


  33.5 mmHg


(35.0-45.0)  L


 


Arterial Blood Partial


Pressure O2 


  


  


  80.5 mmHg


(75.0-100.0)


 


Arterial Blood HCO3


  


  


  


  20.0 mmol/L


(22.0-26.0)  L


 


Arterial Blood Oxygen


Saturation 


  


  


  95.9 %


(92.0-98.0)


 


Arterial Blood Base Excess    -4.1  


 


[place] Test    Positive  











Microbiology








 Date/Time


Source Procedure


Growth Status


 


 


 2/27/18 06:38


Nasal Nares Influenza Types A,B Antigen (LINO) - Final Complete








Height (Feet):  5


Height (Inches):  7.00


Weight (Pounds):  174


Medications





Current Medications








 Medications


  (Trade)  Dose


 Ordered  Sig/[place]


 Route


 PRN Reason  Start Time


 Stop Time Status Last Admin


Dose Admin


 


 Heparin Sodium


  (Porcine)


  (Heparin 5000


 units/ml)  5,000 units  EVERY 12  HOURS


 SUBQ


   2/27/18 21:00


 3/29/18 20:59   


 


 


 Metoclopramide HCl


  (Reglan)  5 mg  EVERY 6  HOURS


 GT


   2/27/18 18:00


 3/29/18 17:59  2/27/18 17:09


 


 


 Pantoprazole


  (Protonix)  40 mg  EVERY 12  HOURS


 IVP


   2/27/18 21:00


 3/29/18 20:59   


 


 


 Piperacillin Sod/


 Tazobactam Sod


 3.375 gm/Sodium


 Chloride  110 ml @ 


 27.5 mls/hr  Q8HR


 IVPB


   2/27/18 15:00


 3/6/18 14:59  2/27/18 17:07


 


 


 Potassium


 Chloride 10 meq/


 Sodium Chloride  1,005 ml @ 


 75 mls/hr  [id number]


 IV


   2/27/18 16:30


 3/29/18 16:29  2/27/18 17:07


 


 


 Sennosides


  (Senokot)  2 tab  BEDTIME


 GT


   2/27/18 21:00


 3/29/18 20:59   


 


 


 Sitagliptin


 Phosphate


  (Januvia)  50 mg  DAILY


 GT


   2/28/18 09:00


 3/30/18 08:59   


 


 


 Vancomycin HCl


  (Vanco rx to


 dose)  1 ea  DAILY  PRN


 MISC


 Per rx protocol  2/27/18 14:00


 3/29/18 13:59   


 


 


 Vancomycin HCl/


 Dextrose  250 ml @ 


 166.667


 mls/hr  Q24H


 IVPB


   2/28/18 10:00


 3/5/18 09:59   


 











Assessment/Plan


Problem List:  


(1) Acute respiratory failure


ICD Codes:  J96.00 - Acute respiratory failure, unspecified whether with 

hypoxia or hypercapnia


SNOMED:  88896163


(2) HCAP (healthcare-associated pneumonia)


ICD Codes:  J18.9 - Pneumonia, unspecified organism


SNOMED:  165010068


(3) Sepsis


ICD Codes:  A41.9 - Sepsis, unspecified organism


SNOMED:  99664499


(4) UTI (urinary tract infection)


ICD Codes:  N39.0 - Urinary tract infection, site not specified


SNOMED:  11015906


(5) Diabetes mellitus


ICD Codes:  E11.9 - Type 2 diabetes mellitus without complications


SNOMED:  67598347


Assessment/Plan


respiratory treatment


titrate fio2 to sat of 92%


check sputum


broad spectrum abx


aspiration precaution


sliding scale











JOAN DEMARCO Feb 27, 2018 21:28 none known

## 2022-07-10 NOTE — DISCHARGE SUMMARY 2 SIG
DATE OF ADMISSION:  05/14/2017



DATE OF DISCHARGE:  05/17/2017



CONSULTANT:  Nash Stokes M.D.



BRIEF HOSPITAL COURSE:  The patient is an 87-year-old male, who is a

resident of [facility].  The patient was

transferred to emergency room for chest congestion and fever.  The patient

was not responding to breathing treatments at the nursing home.  He was

found to be hypoxemic and was taken to [facility] for further

evaluation.  On evaluation at ED, the patient presented with sepsis.

Urine WBC too many to count with 3+ leukocyte esterase.  Chest x-ray

showed increased interstitial markings.  The patient was admitted for

further evaluation and was started on Zosyn.  Pending culture results.  He

was given IV hydration and was followed by Dr. Stokes from pulmonary

service.  He was given respiratory treatments and was continued on G-tube

feeds.  He came in with deep tissue injuries on the sacral area and upper

and lower extremities.  Wound care was rendered.  Wound culture showed

Staph aureus and Proteus mirabilis.  Blood culture did not isolate any

growth.  Sputum culture did not isolate any growth.  Zosyn was

discontinued and the patient was given Macrobid.  Repeat chest x-ray

showed persistent elevation on the right hemidiaphragm.  Cultures has been

negative.  The patient was discharged back to SNF to finish 10 more days

of nitrofurantoin.



FINAL DIAGNOSES:

1. Sepsis.

2. Pneumonia.

3. Possible urinary tract infection.

4. Diabetes mellitus.

5. Dementia.

6. Feeding by gastrostomy tube.

7. Possible aspiration.

8. Multiple deep tissue injury, present on admission.







  ______________________________________________

  Nish Schilling M.D.



I have been assigned to dictate discharge summary on this account and I

was not involved in the patient's management.



  ______________________________________________

  Jojo Harrington N.P.





DR:  RENATA

D:  05/18/2017 13:23

T:  05/19/2017 03:02

JOB#:  [id number]

CC:
no

## 2024-10-14 NOTE — CARDIOLOGY REPORT
--------------- APPROVED REPORT --------------





EKG Measurement

Heart [id number]

[id number]

[id number]

[id number]

[id number]





Sinus rhythm with premature atrial complexes in a pattern of bigeminy

Low voltage QRS

Borderline ECG
Informed patient that I faxed work excuse  
Smoking Cessation